# Patient Record
Sex: FEMALE | Race: WHITE | NOT HISPANIC OR LATINO | Employment: OTHER | URBAN - METROPOLITAN AREA
[De-identification: names, ages, dates, MRNs, and addresses within clinical notes are randomized per-mention and may not be internally consistent; named-entity substitution may affect disease eponyms.]

---

## 2017-01-04 ENCOUNTER — APPOINTMENT (OUTPATIENT)
Dept: PHYSICAL THERAPY | Facility: CLINIC | Age: 62
End: 2017-01-04
Payer: COMMERCIAL

## 2017-01-04 PROCEDURE — 97116 GAIT TRAINING THERAPY: CPT

## 2017-01-04 PROCEDURE — 97110 THERAPEUTIC EXERCISES: CPT

## 2017-01-04 PROCEDURE — 97140 MANUAL THERAPY 1/> REGIONS: CPT

## 2017-01-06 ENCOUNTER — APPOINTMENT (OUTPATIENT)
Dept: PHYSICAL THERAPY | Facility: CLINIC | Age: 62
End: 2017-01-06
Payer: COMMERCIAL

## 2017-01-06 PROCEDURE — 97116 GAIT TRAINING THERAPY: CPT

## 2017-01-06 PROCEDURE — 97140 MANUAL THERAPY 1/> REGIONS: CPT

## 2017-01-06 PROCEDURE — 97110 THERAPEUTIC EXERCISES: CPT

## 2017-01-09 ENCOUNTER — APPOINTMENT (OUTPATIENT)
Dept: PHYSICAL THERAPY | Facility: CLINIC | Age: 62
End: 2017-01-09
Payer: COMMERCIAL

## 2017-01-10 ENCOUNTER — APPOINTMENT (OUTPATIENT)
Dept: PHYSICAL THERAPY | Facility: CLINIC | Age: 62
End: 2017-01-10
Payer: COMMERCIAL

## 2017-01-10 ENCOUNTER — GENERIC CONVERSION - ENCOUNTER (OUTPATIENT)
Dept: OTHER | Facility: OTHER | Age: 62
End: 2017-01-10

## 2017-01-10 PROCEDURE — 97140 MANUAL THERAPY 1/> REGIONS: CPT

## 2017-01-10 PROCEDURE — 97110 THERAPEUTIC EXERCISES: CPT

## 2017-01-10 PROCEDURE — 97116 GAIT TRAINING THERAPY: CPT

## 2017-01-12 ENCOUNTER — APPOINTMENT (OUTPATIENT)
Dept: PHYSICAL THERAPY | Facility: CLINIC | Age: 62
End: 2017-01-12
Payer: COMMERCIAL

## 2017-01-12 PROCEDURE — 97116 GAIT TRAINING THERAPY: CPT

## 2017-01-12 PROCEDURE — 97140 MANUAL THERAPY 1/> REGIONS: CPT

## 2017-01-12 PROCEDURE — 97110 THERAPEUTIC EXERCISES: CPT

## 2017-01-17 ENCOUNTER — APPOINTMENT (OUTPATIENT)
Dept: PHYSICAL THERAPY | Facility: CLINIC | Age: 62
End: 2017-01-17
Payer: COMMERCIAL

## 2017-01-17 PROCEDURE — 97140 MANUAL THERAPY 1/> REGIONS: CPT

## 2017-01-17 PROCEDURE — 97116 GAIT TRAINING THERAPY: CPT

## 2017-01-17 PROCEDURE — 97110 THERAPEUTIC EXERCISES: CPT

## 2017-01-19 ENCOUNTER — APPOINTMENT (OUTPATIENT)
Dept: PHYSICAL THERAPY | Facility: CLINIC | Age: 62
End: 2017-01-19
Payer: COMMERCIAL

## 2017-01-25 ENCOUNTER — APPOINTMENT (OUTPATIENT)
Dept: PHYSICAL THERAPY | Facility: CLINIC | Age: 62
End: 2017-01-25
Payer: COMMERCIAL

## 2017-01-25 PROCEDURE — 97110 THERAPEUTIC EXERCISES: CPT

## 2017-01-25 PROCEDURE — 97116 GAIT TRAINING THERAPY: CPT

## 2017-01-25 PROCEDURE — 97140 MANUAL THERAPY 1/> REGIONS: CPT

## 2017-01-27 ENCOUNTER — APPOINTMENT (OUTPATIENT)
Dept: PHYSICAL THERAPY | Facility: CLINIC | Age: 62
End: 2017-01-27
Payer: COMMERCIAL

## 2017-01-27 PROCEDURE — 97110 THERAPEUTIC EXERCISES: CPT

## 2017-01-27 PROCEDURE — 97140 MANUAL THERAPY 1/> REGIONS: CPT

## 2017-01-30 ENCOUNTER — APPOINTMENT (OUTPATIENT)
Dept: PHYSICAL THERAPY | Facility: CLINIC | Age: 62
End: 2017-01-30
Payer: COMMERCIAL

## 2017-01-30 PROCEDURE — 97140 MANUAL THERAPY 1/> REGIONS: CPT

## 2017-01-30 PROCEDURE — 97110 THERAPEUTIC EXERCISES: CPT

## 2017-02-02 ENCOUNTER — APPOINTMENT (OUTPATIENT)
Dept: PHYSICAL THERAPY | Facility: CLINIC | Age: 62
End: 2017-02-02
Payer: COMMERCIAL

## 2017-02-02 PROCEDURE — 97110 THERAPEUTIC EXERCISES: CPT

## 2017-02-02 PROCEDURE — 97140 MANUAL THERAPY 1/> REGIONS: CPT

## 2017-02-02 PROCEDURE — 97116 GAIT TRAINING THERAPY: CPT

## 2017-02-10 ENCOUNTER — TRANSCRIBE ORDERS (OUTPATIENT)
Dept: LAB | Facility: HOSPITAL | Age: 62
End: 2017-02-10

## 2017-02-10 ENCOUNTER — APPOINTMENT (OUTPATIENT)
Dept: LAB | Facility: HOSPITAL | Age: 62
End: 2017-02-10
Payer: COMMERCIAL

## 2017-02-10 DIAGNOSIS — I10 ESSENTIAL HYPERTENSION, MALIGNANT: Primary | ICD-10-CM

## 2017-02-10 DIAGNOSIS — I10 ESSENTIAL (PRIMARY) HYPERTENSION: ICD-10-CM

## 2017-02-10 LAB
ALBUMIN SERPL BCP-MCNC: 3.8 G/DL (ref 3.5–5)
ALP SERPL-CCNC: 50 U/L (ref 46–116)
ALT SERPL W P-5'-P-CCNC: 62 U/L (ref 12–78)
ANION GAP SERPL CALCULATED.3IONS-SCNC: 7 MMOL/L (ref 4–13)
AST SERPL W P-5'-P-CCNC: 24 U/L (ref 5–45)
BACTERIA UR QL AUTO: NORMAL /HPF
BASOPHILS # BLD AUTO: 0.04 THOUSANDS/ΜL (ref 0–0.1)
BASOPHILS NFR BLD AUTO: 1 % (ref 0–1)
BILIRUB SERPL-MCNC: 0.62 MG/DL (ref 0.2–1)
BILIRUB UR QL STRIP: NEGATIVE
BUN SERPL-MCNC: 16 MG/DL (ref 5–25)
CALCIUM SERPL-MCNC: 9 MG/DL (ref 8.3–10.1)
CHLORIDE SERPL-SCNC: 104 MMOL/L (ref 100–108)
CHOLEST SERPL-MCNC: 190 MG/DL (ref 50–200)
CLARITY UR: CLEAR
CO2 SERPL-SCNC: 30 MMOL/L (ref 21–32)
COLOR UR: YELLOW
CREAT SERPL-MCNC: 0.88 MG/DL (ref 0.6–1.3)
EOSINOPHIL # BLD AUTO: 0.21 THOUSAND/ΜL (ref 0–0.61)
EOSINOPHIL NFR BLD AUTO: 4 % (ref 0–6)
ERYTHROCYTE [DISTWIDTH] IN BLOOD BY AUTOMATED COUNT: 12 % (ref 11.6–15.1)
GFR SERPL CREATININE-BSD FRML MDRD: >60 ML/MIN/1.73SQ M
GLUCOSE SERPL-MCNC: 91 MG/DL (ref 65–140)
GLUCOSE UR STRIP-MCNC: NEGATIVE MG/DL
HCT VFR BLD AUTO: 40.4 % (ref 34.8–46.1)
HDLC SERPL-MCNC: 62 MG/DL (ref 40–60)
HGB BLD-MCNC: 13.5 G/DL (ref 11.5–15.4)
HGB UR QL STRIP.AUTO: NEGATIVE
HYALINE CASTS #/AREA URNS LPF: NORMAL /LPF
KETONES UR STRIP-MCNC: NEGATIVE MG/DL
LDLC SERPL CALC-MCNC: 102 MG/DL (ref 0–100)
LEUKOCYTE ESTERASE UR QL STRIP: ABNORMAL
LYMPHOCYTES # BLD AUTO: 2.11 THOUSANDS/ΜL (ref 0.6–4.47)
LYMPHOCYTES NFR BLD AUTO: 42 % (ref 14–44)
MCH RBC QN AUTO: 30.3 PG (ref 26.8–34.3)
MCHC RBC AUTO-ENTMCNC: 33.4 G/DL (ref 31.4–37.4)
MCV RBC AUTO: 91 FL (ref 82–98)
MONOCYTES # BLD AUTO: 0.41 THOUSAND/ΜL (ref 0.17–1.22)
MONOCYTES NFR BLD AUTO: 8 % (ref 4–12)
NEUTROPHILS # BLD AUTO: 2.21 THOUSANDS/ΜL (ref 1.85–7.62)
NEUTS SEG NFR BLD AUTO: 45 % (ref 43–75)
NITRITE UR QL STRIP: NEGATIVE
NON-SQ EPI CELLS URNS QL MICRO: NORMAL /HPF
NRBC BLD AUTO-RTO: 0 /100 WBCS
PH UR STRIP.AUTO: 5.5 [PH] (ref 4.5–8)
PLATELET # BLD AUTO: 290 THOUSANDS/UL (ref 149–390)
PMV BLD AUTO: 9.8 FL (ref 8.9–12.7)
POTASSIUM SERPL-SCNC: 4.2 MMOL/L (ref 3.5–5.3)
PROT SERPL-MCNC: 7.3 G/DL (ref 6.4–8.2)
PROT UR STRIP-MCNC: NEGATIVE MG/DL
RBC # BLD AUTO: 4.46 MILLION/UL (ref 3.81–5.12)
RBC #/AREA URNS AUTO: NORMAL /HPF
SODIUM SERPL-SCNC: 141 MMOL/L (ref 136–145)
SP GR UR STRIP.AUTO: 1.01 (ref 1–1.03)
T4 FREE SERPL-MCNC: 0.89 NG/DL (ref 0.76–1.46)
TRIGL SERPL-MCNC: 128 MG/DL
TSH SERPL DL<=0.05 MIU/L-ACNC: 1.24 UIU/ML (ref 0.36–3.74)
UROBILINOGEN UR QL STRIP.AUTO: 0.2 E.U./DL
WBC # BLD AUTO: 4.99 THOUSAND/UL (ref 4.31–10.16)
WBC #/AREA URNS AUTO: NORMAL /HPF

## 2017-02-10 PROCEDURE — 85025 COMPLETE CBC W/AUTO DIFF WBC: CPT

## 2017-02-10 PROCEDURE — 80061 LIPID PANEL: CPT

## 2017-02-10 PROCEDURE — 84439 ASSAY OF FREE THYROXINE: CPT

## 2017-02-10 PROCEDURE — 81001 URINALYSIS AUTO W/SCOPE: CPT | Performed by: INTERNAL MEDICINE

## 2017-02-10 PROCEDURE — 36415 COLL VENOUS BLD VENIPUNCTURE: CPT

## 2017-02-10 PROCEDURE — 80053 COMPREHEN METABOLIC PANEL: CPT

## 2017-02-10 PROCEDURE — 84443 ASSAY THYROID STIM HORMONE: CPT

## 2017-02-13 ENCOUNTER — APPOINTMENT (OUTPATIENT)
Dept: LAB | Facility: HOSPITAL | Age: 62
End: 2017-02-13
Payer: COMMERCIAL

## 2017-02-13 DIAGNOSIS — I10 ESSENTIAL HYPERTENSION, MALIGNANT: ICD-10-CM

## 2017-02-13 LAB — HEMOCCULT STL QL IA: POSITIVE

## 2017-02-13 PROCEDURE — G0328 FECAL BLOOD SCRN IMMUNOASSAY: HCPCS

## 2017-02-16 ENCOUNTER — ALLSCRIPTS OFFICE VISIT (OUTPATIENT)
Dept: OTHER | Facility: OTHER | Age: 62
End: 2017-02-16

## 2017-03-07 ENCOUNTER — GENERIC CONVERSION - ENCOUNTER (OUTPATIENT)
Dept: OTHER | Facility: OTHER | Age: 62
End: 2017-03-07

## 2017-03-21 ENCOUNTER — GENERIC CONVERSION - ENCOUNTER (OUTPATIENT)
Dept: OTHER | Facility: OTHER | Age: 62
End: 2017-03-21

## 2017-04-26 ENCOUNTER — LAB REQUISITION (OUTPATIENT)
Dept: LAB | Facility: HOSPITAL | Age: 62
End: 2017-04-26
Payer: COMMERCIAL

## 2017-04-26 ENCOUNTER — ALLSCRIPTS OFFICE VISIT (OUTPATIENT)
Dept: OTHER | Facility: OTHER | Age: 62
End: 2017-04-26

## 2017-04-26 DIAGNOSIS — Z12.31 ENCOUNTER FOR SCREENING MAMMOGRAM FOR MALIGNANT NEOPLASM OF BREAST: ICD-10-CM

## 2017-04-26 DIAGNOSIS — Z01.419 ENCOUNTER FOR GYNECOLOGICAL EXAMINATION WITHOUT ABNORMAL FINDING: ICD-10-CM

## 2017-04-26 PROCEDURE — G0145 SCR C/V CYTO,THINLAYER,RESCR: HCPCS | Performed by: OBSTETRICS & GYNECOLOGY

## 2017-04-26 PROCEDURE — 87624 HPV HI-RISK TYP POOLED RSLT: CPT | Performed by: OBSTETRICS & GYNECOLOGY

## 2017-05-03 LAB
HPV RRNA GENITAL QL NAA+PROBE: NORMAL
LAB AP GYN PRIMARY INTERPRETATION: NORMAL
Lab: NORMAL

## 2017-05-04 ENCOUNTER — GENERIC CONVERSION - ENCOUNTER (OUTPATIENT)
Dept: OTHER | Facility: OTHER | Age: 62
End: 2017-05-04

## 2017-05-23 ENCOUNTER — HOSPITAL ENCOUNTER (OUTPATIENT)
Dept: RADIOLOGY | Facility: HOSPITAL | Age: 62
Discharge: HOME | End: 2017-05-23
Attending: OBSTETRICS & GYNECOLOGY
Payer: COMMERCIAL

## 2017-05-23 DIAGNOSIS — Z12.31 ENCOUNTER FOR SCREENING MAMMOGRAM FOR MALIGNANT NEOPLASM OF BREAST: ICD-10-CM

## 2017-05-23 DIAGNOSIS — Z01.419 ENCOUNTER FOR GYNECOLOGICAL EXAMINATION WITHOUT ABNORMAL FINDING: ICD-10-CM

## 2017-05-23 PROCEDURE — G0202 SCR MAMMO BI INCL CAD: HCPCS

## 2017-05-25 ENCOUNTER — ALLSCRIPTS OFFICE VISIT (OUTPATIENT)
Dept: OTHER | Facility: OTHER | Age: 62
End: 2017-05-25

## 2017-07-06 ENCOUNTER — GENERIC CONVERSION - ENCOUNTER (OUTPATIENT)
Dept: OTHER | Facility: OTHER | Age: 62
End: 2017-07-06

## 2017-07-20 ENCOUNTER — GENERIC CONVERSION - ENCOUNTER (OUTPATIENT)
Dept: OTHER | Facility: OTHER | Age: 62
End: 2017-07-20

## 2017-08-23 ENCOUNTER — ALLSCRIPTS OFFICE VISIT (OUTPATIENT)
Dept: OTHER | Facility: OTHER | Age: 62
End: 2017-08-23

## 2017-08-23 DIAGNOSIS — I10 ESSENTIAL (PRIMARY) HYPERTENSION: ICD-10-CM

## 2018-01-02 ENCOUNTER — ALLSCRIPTS OFFICE VISIT (OUTPATIENT)
Dept: OTHER | Facility: OTHER | Age: 63
End: 2018-01-02

## 2018-01-03 NOTE — PROGRESS NOTES
Assessment   1  Acute bronchitis (466 0) (J20 9)   2  Hypertension (401 9) (I10)    Plan   Acute bronchitis    · Azithromycin 250 MG Oral Tablet; Take as directed per package instructions   · Benzonatate 100 MG Oral Capsule; TAKE 1 CAPSULE 3 TIMES DAILY AS NEEDED    Discussion/Summary      1  Upper respiratory tract infection/bronchitis  Despite this may be only a viral illness patient was placed on a Zithromax Z-Jean to take as directed in order to hopefully expedite her improvement so that she can see her new grandchild  Patient will continue with over-the-counter medications such as Mucinex and she was also given a prescription for Tessalon Perles to use as needed to help with cough  She she was told to keep herself well hydrated and call if there is any changes in her symptoms or lack of improvement  Hypertension  Controlled present treatment  The patient was counseled regarding diagnostic results,-- instructions for management,-- risk factor reductions,-- prognosis,-- patient and family education,-- impressions,-- risks and benefits of treatment options,-- importance of compliance with treatment  Possible side effects of new medications were reviewed with the patient/guardian today  The treatment plan was reviewed with the patient/guardian  The patient/guardian understands and agrees with the treatment plan      Chief Complaint   Upper respiratory tract infection      History of Present Illness   HPI: Patient is a 65-year-old female with a history of hypertension, hyperlipidemia  She is here today for evaluation of an upper respiratory tract infection  She states that she has been ill for approximately 1 week with what started out to be a cough  She states at this time she still is having problems with cough and chest congestion and is bringing up occasionally a dark greenish mucus  She also has a sore throat especially nighttime   She denies any high fever or chills and no increasing shortness of breath or wheezing  She is very upset because she needs to be taking care of her mother who is in her nursing home and also she is now a grandmother and wishes very much to meet the new [facility] Based Practices Required Assessment:      Pain Assessment      the patient states they do not have pain  Abuse And Domestic Violence Screen       Yes, the patient is safe at home  Depression And Suicide Screen  No, the patient has not had thoughts of hurting themself  No, the patient has not felt depressed in the past 7 days  Primary Language is  English  Readiness To Learn: Receptive  Barriers To Learning: none  Preferred Learning: verbal      Education Completed: disease/condition,-- medications-- and-- further treatment/follow-up      Teaching Method: verbal      Person Taught: patient      Evaluation Of Learning: verbalized/demonstrated understanding      Review of Systems        Constitutional: feeling poorly-- and-- feeling tired, but-- no fever,-- no recent weight gain,-- no chills-- and-- no recent weight loss  ENT: nosebleeds-- and-- sore throat, but-- no earache,-- no hearing loss-- and-- no nasal discharge  Cardiovascular: no complaints of slow or fast heart rate, no chest pain, no palpitations, no leg claudication or lower extremity edema  Respiratory: cough, but-- no shortness of breath,-- no orthopnea,-- no wheezing,-- no shortness of breath during exertion-- and-- no PND  Breasts: no complaints of breast pain, breast lump or nipple discharge  Gastrointestinal: no complaints of abdominal pain, no constipation, no nausea or diarrhea, no vomiting, no bloody stools  Genitourinary: no complaints of dysuria, no incontinence, no pelvic pain, no dysmenorrhea, no vaginal discharge or abnormal vaginal bleeding  Musculoskeletal: myalgias, but-- no arthralgias,-- no joint swelling,-- no limb pain,-- no joint stiffness-- and-- no limb swelling  Integumentary: no complaints of skin rash or lesion, no itching or dry skin, no skin wounds  Neurological: no complaints of headache, no confusion, no numbness or tingling, no dizziness or fainting  ROS reviewed  Active Problems   1  Acute bronchitis (466 0) (J20 9)   2  Atopic dermatitis (691 8) (L20 9)   3  Bursitis (727 3) (M71 9)   4  Encounter for screening mammogram for malignant neoplasm of breast (V76 12)     (Z12 31)   5  Gastritis, acute (535 00) (K29 00)   6  Heme positive stool (792 1) (R19 5)   7  Hyperlipidemia (272 4) (E78 5)   8  Hypertension (401 9) (I10)   9  Lumbago (724 2) (M54 5)   10  Lumbago with sciatica (724 3) (M54 40)   11  Need for immunization against influenza (V04 81) (Z23)   12  Need for pneumococcal vaccination (V03 82) (Z23)   13  URI (upper respiratory infection) (465 9) (J06 9)   14  Well female exam with routine gynecological exam (V72 31) (Z01 419)    Past Medical History   Active Problems And Past Medical History Reviewed: The active problems and past medical history were reviewed and updated today  Surgical History   Surgical History Reviewed: The surgical history was reviewed and updated today  Social History    · Never a smoker  The social history was reviewed and updated today  The social history was reviewed and is unchanged  Family History   Family History Reviewed: The family history was reviewed and updated today  Current Meds    1  Atorvastatin Calcium 10 MG Oral Tablet; TAKE 1 TABLET 4 times weekly; Therapy: 69NUC1047 to (Harjeet Guerra)  Requested for: 61ZHD7019 Recorded   2  Lisinopril 10 MG Oral Tablet; Take 1 tablet daily; Therapy: 95IWK0053 to (Pedro Garcia)  Requested for: 57CLB7815; Last     Rx:27Nov2017 Ordered   3  Omeprazole 20 MG Oral Capsule Delayed Release; Take 1 capsule daily as needed;      Therapy: 76ICS6214 to (Last Rx:29Yea7667)  Requested for: 55DBP6102 Ordered     The medication list was reviewed and updated today  Allergies   1  Coly-Mycin S 3 3-3-10-0 5 MG/ML Otic Suspension   2  Cortisporin-TC SUSP    Vitals    Recorded: [id number] 01:53PM   Temperature 96 6 F   Heart Rate 73   Respiration 16   Systolic 749   Diastolic 80   Height 5 ft 5 in   Weight 142 lb    BMI Calculated 23 63   BSA Calculated 1 71   O2 Saturation 98     Physical Exam        Constitutional Mildly ill-appearing, congested-sounding 53-year-old female who is awake alert  Eyes      Conjunctiva and lids: No swelling, erythema or discharge  Pupils and irises: Equal, round and reactive to light  Ears, Nose, Mouth, and Throat      External inspection of ears and nose: Normal        Otoscopic examination: Tympanic membranes translucent with normal light reflex  Canals patent without erythema  Nasal mucosa, septum, and turbinates: Abnormal  -- Mild diffuse erythema to her nasal passages with a clear nasal discharge  Oropharynx: Abnormal  -- Slight erythema to posterior airway with a thick whitish postnasal drip  Pulmonary      Respiratory effort: No increased work of breathing or signs of respiratory distress  Auscultation of lungs: Abnormal  -- Slightly decreased breath sounds anterior and posteriorly but no rales rhonchi or wheezes  Cardiovascular      Palpation of heart: Normal PMI, no thrills  Auscultation of heart: Normal rate and rhythm, normal S1 and S2, without murmurs  Abdomen      Abdomen: Abnormal  -- Mildly obese soft nontender  Liver and spleen: No hepatomegaly or splenomegaly  Lymphatic      Palpation of lymph nodes in neck: No lymphadenopathy         Musculoskeletal      Gait and station: Normal        Psychiatric      Orientation to person, place, and time: Normal        Mood and affect: Normal           Future Appointments      Date/Time Provider Specialty Site   02/28/2018 12:00 PM Sarah Barry DO Internal Medicine [facility] INTERNAL MED     Signatures    Electronically signed by : Elisabeth Hartman DO; Jan 2 2018  2:28PM EST                       (Author)

## 2018-01-10 NOTE — RESULT NOTES
Verified Results  (1) THIN PREP PAP WITH IMAGING 66LFL2801 55:40OC Horacio Rodriguez Order Number: [id number]     Test Name Result Flag Reference   LAB AP CASE REPORT (Report)     Gynecologic Cytology Report            Case: [id number]                  Authorizing Provider: Jamel Leyva DO     Collected:      04/26/2017 1505        First Screen:     QUENTIN Armenta   Received:      04/28/2017 1443        Specimen:  LIQUID-BASED PAP, SCREENING, Endocervical   HPV HIGH RISK RESULT (Report)     HPV, High Risk: HPV NEG, HPV16 NEG, HPV18 NEG      Other High Risk HPV Negative, HPV 16 Negative, HPV 18 Negative  HPV types: 16,18,31,33,35,39,45,51,52,56,58,59,66 and 68 DNA are undetectable or below the pre-set threshold  [employer]???s FDA approved Lemuel 4800 is utilized with strict adherence to the ???s instruction  manual to test for the presence of High-Risk HPV DNA, as well as HPV 16 and HPV 18  This instrument  has been validated by our laboratory and/or by the   A negative result does not preclude the presence of HPV infection because results depend on adequate  specimen collection, absence of inhibitors and sufficient DNA to be detected  Additionally, HPV negative  results are not intended to prevent women from proceeding to colposcopy if clinically warranted  Positive HPV test results indicate the presence of any one or more of the high risk types, but since patients  are often co-infected with low-risk types it does not rule out the presence of low-risk types in patients  with mixed infections  LAB AP GYN PRIMARY INTERPRETATION      Negative for intraepithelial lesion or malignancy  Electronically signed by QUENTIN Armetna on 5/3/2017 at 1:41 PM   LAB AP GYN SPECIMEN ADEQUACY      Satisfactory for evaluation  Endocervical/transformation zone component present     LAB AP GYN ADDITIONAL INFORMATION (Report)     [employer]'s FDA approved ,  and ThinPrep Imaging System are   utilized with strict adherence to the 's instruction manual to   prepare gynecologic and non-gynecologic cytology specimens for the   production of ThinPrep slides as well as for gynecologic ThinPrep imaging  These processes have been validated by our laboratory and/or by the     The Pap test is not a diagnostic procedure and should not be used as the   sole means to detect cervical cancer  It is only a screening procedure to   aid in the detection of cervical cancer and its precursors  Both   false-negative and false-positive results have been experienced  Your   patient's test result should be interpreted in this context together with   the history and clinical findings

## 2018-01-12 VITALS
OXYGEN SATURATION: 98 % | BODY MASS INDEX: 23.18 KG/M2 | HEART RATE: 71 BPM | HEIGHT: 65 IN | SYSTOLIC BLOOD PRESSURE: 130 MMHG | TEMPERATURE: 97.4 F | DIASTOLIC BLOOD PRESSURE: 86 MMHG | WEIGHT: 139.13 LBS

## 2018-01-12 NOTE — PROGRESS NOTES
Assessment    1  Hypertension (401 9) (I10)   2  Hyperlipidemia (272 4) (E78 5)    Plan  Hyperlipidemia    · Follow-up visit in 6 months Evaluation and Treatment  Follow-up  Status: Hold For - Scheduling   Requested for: 23Aug2017  Hypertension    · (1) CBC/PLT/DIFF; Status:Active; Requested for:23Aug2017;    · (1) COMPREHENSIVE METABOLIC PANEL; Status:Active; Requested for:23Aug2017;    · (1) LIPID PANEL, FASTING; Status:Active; Requested for:23Aug2017;    · (1) TSH WITH FT4 REFLEX; Status:Active; Requested for:23Aug2017;    · (1) URINALYSIS (will reflex a microscopy if leukocytes, occult blood, protein or nitrites are not within  normal limits); Status:Active; Requested for:23Aug2017;     Discussion/Summary  Discussion Summary:   #1  Hypertension  As noted patient's blood pressure is still mildly elevated but not as severely so is her last visit  Again patient admits to being under a lot of stress and this may be the aggravating factor  Patient will return to the office in 6 months for routine physical and we will check her blood pressure at that time and if it is still elevated would consider modification of treatment  #2  Hyperlipidemia  Patient states she's been working hard on her diet and reducing fats and cholesterol in her diet  She admits to the fact that she's not getting as much regular exercises in the past     Patient will be  Counseling Documentation With Imm: The patient was counseled regarding diagnostic results, instructions for management, risk factor reductions, prognosis, patient and family education, impressions, risks and benefits of treatment options, importance of compliance with treatment  Medication SE Review and Pt Understands Tx: Possible side effects of new medications were reviewed with the patient/guardian today  The treatment plan was reviewed with the patient/guardian   The patient/guardian understands and agrees with the treatment plan      Chief Complaint  Chief Complaint Free Text Note Form: Patient is here today for a 4 month follow up   [address] [place] [address] Ave: Patient is here today for follow up of chronic conditions described in HPI  History of Present Illness  HPI: Patient is a 51-year-old female with a history of hypertension, hyperlipidemia, history of lumbago with sciatica in the past  Patient is here today for routine follow-up after 6 month period of time  As noted patient's blood pressure is improved somewhat but is not back to baseline  Apparently patient is still going through a lot of stresses not only at home but at work  She denies any new specific medical problems or complaints  She denies chest pain or pressure and no shortness of breath  Review of Systems  Complete-Female:   Constitutional: No fever, no chills, feels well, no tiredness, no recent weight gain or weight loss  Eyes: No complaints of eye pain, no red eyes, no eyesight problems, no discharge, no dry eyes, no itching of eyes  ENT: no complaints of earache, no loss of hearing, no nose bleeds, no nasal discharge, no sore throat, no hoarseness  Cardiovascular: No complaints of slow heart rate, no fast heart rate, no chest pain, no palpitations, no leg claudication, no lower extremity edema  Respiratory: No complaints of shortness of breath, no wheezing, no cough, no SOB on exertion, no orthopnea, no PND  Gastrointestinal: Some mild stomach upset, but no abdominal pain, no nausea, no vomiting, no constipation, no diarrhea and no blood in stools  Genitourinary: No complaints of dysuria, no incontinence, no pelvic pain, no dysmenorrhea, no vaginal discharge or bleeding  Musculoskeletal: no arthralgias, no joint swelling, no limb pain, no myalgias, no joint stiffness and no limb swelling  Integumentary: No complaints of skin rash or lesions, no itching, no skin wounds, no breast pain or lump     Neurological: No complaints of headache, no confusion, no convulsions, no numbness, no dizziness or fainting, no tingling, no limb weakness, no difficulty walking  Psychiatric: anxiety, but not suicidal, no personality change, no sleep disturbances and no depression  Endocrine: No complaints of proptosis, no hot flashes, no muscle weakness, no deepening of the voice, no feelings of weakness  Hematologic/Lymphatic: No complaints of swollen glands, no swollen glands in the neck, does not bleed easily, does not bruise easily  ROS Reviewed:   ROS reviewed  Active Problems    1  Acute bronchitis (466 0) (J20 9)   2  Atopic dermatitis (691 8) (L20 9)   3  Bursitis (727 3) (M71 9)   4  Encounter for screening mammogram for malignant neoplasm of breast (V76 12) (Z12 31)   5  Gastritis, acute (535 00) (K29 00)   6  Heme positive stool (792 1) (R19 5)   7  Hyperlipidemia (272 4) (E78 5)   8  Hypertension (401 9) (I10)   9  Lumbago (724 2) (M54 5)   10  Lumbago with sciatica (724 3) (M54 40)   11  Need for immunization against influenza (V04 81) (Z23)   12  URI (upper respiratory infection) (465 9) (J06 9)   13  Well female exam with routine gynecological exam () (Z01 419)    Surgical History    1  History of  Section   2  History of Hysteroscopy With Endometrial Ablation    Family History  Mother    1  No pertinent family history    Social History    · Never a smoker    Current Meds   1  Atorvastatin Calcium 10 MG Oral Tablet; take 1 tablet by mouth daily; Therapy: 34KOW6387 to (Evaluate:2018)  Requested for: 96RYV8135; Last Rx:2017   Ordered   2  Lisinopril 10 MG Oral Tablet; Take 1 tablet daily; Therapy: 11KBI7338 to (Jennifer Maloney)  Requested for: 17FJL5805; Last Rx:2016   Ordered   3  Omeprazole 20 MG Oral Capsule Delayed Release; Take 1 capsule daily as needed; Therapy: 25JSG8859 to (Last Rx:50Mqi5238)  Requested for: 45YND4828 Ordered    Allergies    1  Coly-Mycin S 3 3-3-10-0 5 MG/ML Otic Suspension   2   Cortisporin-TC SUSP    Vitals  Vital Signs    Recorded: 23Aug2017 11:53AM   Temperature 97 4 F   Heart Rate 71   Systolic 407   Diastolic 86   Height 5 ft 5 in   Weight 139 lb 2 oz   BMI Calculated 23 15   BSA Calculated 1 7   O2 Saturation 98     Physical Exam    Constitutional Pleasant healthy-appearing 68-year-old female who is awake alert  Eyes   Conjunctiva and lids: No swelling, erythema or discharge  Pupils and irises: Equal, round and reactive to light  Ears, Nose, Mouth, and Throat   External inspection of ears and nose: Normal     Otoscopic examination: Tympanic membranes translucent with normal light reflex  Canals patent without erythema  Nasal mucosa, septum, and turbinates: Normal without edema or erythema  Oropharynx: Normal with no erythema, edema, exudate or lesions  Pulmonary   Respiratory effort: No increased work of breathing or signs of respiratory distress  Auscultation of lungs: Clear to auscultation  Cardiovascular   Palpation of heart: Normal PMI, no thrills  Auscultation of heart: Normal rate and rhythm, normal S1 and S2, without murmurs  Examination of extremities for edema and/or varicosities: Normal     Carotid pulses: Normal     Abdomen   Abdomen: Abnormal   Mildly obese soft nontender  Liver and spleen: No hepatomegaly or splenomegaly  Lymphatic   Palpation of lymph nodes in neck: No lymphadenopathy  Musculoskeletal   Gait and station: Normal     Digits and nails: Normal without clubbing or cyanosis  Inspection/palpation of joints, bones, and muscles: Normal     Skin   Skin and subcutaneous tissue: Normal without rashes or lesions  Neurologic   Cranial nerves: Cranial nerves 2-12 intact  Reflexes: 2+ and symmetric  Sensation: No sensory loss      Psychiatric   Orientation to person, place, and time: Normal     Mood and affect: Normal          Results/Data  PHQ-2 Adult Depression Screening 23Aug2017 11:57AM User, [employer]     Test Name Result Flag Reference PHQ-2 Adult Depression Score 0     Over the last two weeks, how often have you been bothered by any of the following problems?   Little interest or pleasure in doing things: Not at all - 0  Feeling down, depressed, or hopeless: Not at all - 0   PHQ-2 Adult Depression Screening Negative         Signatures   Electronically signed by : Evelyne Langford DO; Aug 23 2017 12:25PM EST                       (Author)

## 2018-01-14 VITALS
SYSTOLIC BLOOD PRESSURE: 130 MMHG | OXYGEN SATURATION: 96 % | RESPIRATION RATE: 16 BRPM | WEIGHT: 143 LBS | TEMPERATURE: 100.8 F | BODY MASS INDEX: 23.82 KG/M2 | DIASTOLIC BLOOD PRESSURE: 100 MMHG | HEIGHT: 65 IN | HEART RATE: 88 BPM

## 2018-01-14 VITALS
BODY MASS INDEX: 23.82 KG/M2 | HEIGHT: 65 IN | HEART RATE: 73 BPM | DIASTOLIC BLOOD PRESSURE: 90 MMHG | WEIGHT: 143 LBS | SYSTOLIC BLOOD PRESSURE: 118 MMHG

## 2018-01-18 NOTE — PROGRESS NOTES
Assessment    1  Gastritis, acute (535 00) (K29 00)   2  Hypertension (401 9) (I10)   3  Hyperlipidemia (272 4) (E78 5)   4  Heme positive stool (792 1) (R19 5)    Plan  Heme positive stool    · Follow-up visit in 4 Months Evaluation and Treatment  Follow-up  Status: Hold For -  Scheduling  Requested for: [id number]    Discussion/Summary  health maintenance visit Currently, she eats a healthy diet and has an adequate exercise regimen  the risks and benefits of cervical cancer screening were discussed cervical cancer screening is current Breast cancer screening: the risks and benefits of breast cancer screening were discussed and mammogram is current  Colorectal cancer screening: the risks and benefits of colorectal cancer screening were discussed, colorectal cancer screening is current and fecal occult blood testing is needed every year  Osteoporosis screening: the risks and benefits of osteoporosis screening were discussed and bone mineral density testing is not indicated  The risks and benefits of immunizations were discussed and immunizations are up to date  Advice and education were given regarding nutrition, vitamin D supplements and sunscreen use  Patient discussion: discussed with the patient  #1  Possible acute gastritis  Patient states that she has been under a lot of stress and this is when her problem began  Again she's responded extremely well to over-the-counter medications such as Mylanta or Tums  Patient will be placed on omeprazole 20 mg by mouth daily and was told if not responding to the treatment to please call immediately for further workup and evaluation  Patient was told that if any black stools, blood in the stools to please call immediately  #2  Hypertension  Patient's blood pressure showing excellent control with present treatment  Patient will continue with present medication and we will check this in 4 months  #3  Hyperlipidemia   Patient's cholesterol is showing excellent control with present treatment  Patient will continue on present medication  There is a history of heart disease in the family so we reassured the patient that this is the proper treatment  #4  Heme positive stools  Patient did have a routine colonoscopy in 2003 but because of heme positive stool she will be sent for GI workup with her colorectal specialist    The patient has the current Goals: Resolution of abdominal discomfort  The patent has the current Barriers: No specific barriers at this time  Patient is able to Self-Care  Possible side effects of new medications were reviewed with the patient/guardian today  The treatment plan was reviewed with the patient/guardian  The patient/guardian understands and agrees with the treatment plan   The patient was counseled regarding diagnostic results, instructions for management, risk factor reductions, prognosis, patient and family education, impressions, risks and benefits of treatment options, importance of compliance with treatment  Chief Complaint  Patient is here today for a yearly physical w/ labs      History of Present Illness  HM, Adult Female: The patient is being seen for a health maintenance evaluation  The last health maintenance visit was One year ago year(s) ago  Social History: Household members include spouse  She is   Work status: working full time  The patient has never smoked cigarettes  She reports rare alcohol use  The patient has no concerns about alcohol abuse  She has never used illicit drugs  General Health: She has regular dental visits  She denies vision problems  She denies hearing loss  Immunizations status: up to date  Lifestyle:  She consumes a diverse and healthy diet  She has weight concerns  She does not exercise regularly  She uses tobacco  She consumes alcohol  She denies drug use  Reproductive health: the patient is postmenopausal   she reports normal menses  she is sexually active  pregnancy history:  Screening: cancer screening reviewed and updated  metabolic screening reviewed and updated  risk screening reviewed and updated  HPI: Patient is a 53-year-old female with a history of hyperlipidemia, hypertension  Patient is here today for routine follow-up and general physical  She did have complete labs obtained prior to her visit today we did discuss the results  The only abnormality is a heme-positive stool and she will be making an appointment in the near future to see her colorectal specialist  She is up-to-date with routine colonoscopies  Patient states in general she's feeling well but states she has some slight indigestion occasionally  She relates that she is taking Mylanta occasionally which is helpful or problems  She denies any black stools or blood in the stools and no nausea or vomiting  She has had no significant weight loss  As to her back pain she states she's completely recovered after extensive physical therapy and the thought was that the pain was not in her back so much as in her hip      Review of Systems    Constitutional: No fever, no chills, feels well, no tiredness, no recent weight gain or weight loss  Eyes: No complaints of eye pain, no red eyes, no eyesight problems, no discharge, no dry eyes, no itching of eyes  ENT: no complaints of earache, no loss of hearing, no nose bleeds, no nasal discharge, no sore throat, no hoarseness  Cardiovascular: No complaints of slow heart rate, no fast heart rate, no chest pain, no palpitations, no leg claudication, no lower extremity edema  Respiratory: No complaints of shortness of breath, no wheezing, no cough, no SOB on exertion, no orthopnea, no PND  Gastrointestinal: Some mild stomach upset, but no abdominal pain, no nausea, no vomiting, no constipation, no diarrhea and no blood in stools  Genitourinary: No complaints of dysuria, no incontinence, no pelvic pain, no dysmenorrhea, no vaginal discharge or bleeding  Musculoskeletal: no arthralgias, no joint swelling, no limb pain, no myalgias, no joint stiffness and no limb swelling  Integumentary: No complaints of skin rash or lesions, no itching, no skin wounds, no breast pain or lump  Neurological: No complaints of headache, no confusion, no convulsions, no numbness, no dizziness or fainting, no tingling, no limb weakness, no difficulty walking  Psychiatric: Not suicidal, no sleep disturbance, no anxiety or depression, no change in personality, no emotional problems  Endocrine: No complaints of proptosis, no hot flashes, no muscle weakness, no deepening of the voice, no feelings of weakness  Hematologic/Lymphatic: No complaints of swollen glands, no swollen glands in the neck, does not bleed easily, does not bruise easily  Over the past 2 weeks, how often have you been bothered by the following problems? 1 ) Little interest or pleasure in doing things? Not at all    2 ) Feeling down, depressed or hopeless? Not at all    3 ) Trouble falling asleep or sleeping too much? Not at all    4 ) Feeling tired or having little energy? Not at all    5 ) Poor appetite or overeating? Not at all    6 ) Feeling bad about yourself, or that you are a failure, or have let yourself or your family down? Not at all    7 ) Trouble concentrating on things, such as reading a newspaper or watching television? Not at all    8 ) Moving or speaking so slowly that other people could have noticed, or the opposite, moving or speaking faster than usual? Not at all    9 ) Thoughts that you would be better off dead or of hurting yourself in some way? Not at all  Score 0      Active Problems    1  Acute bronchitis (466 0) (J20 9)   2  Atopic dermatitis (691 8) (L20 9)   3  Bursitis (727 3) (M71 9)   4  Gastritis, acute (535 00) (K29 00)   5  Hyperlipidemia (272 4) (E78 5)   6  Hypertension (401 9) (I10)   7  Lumbago (724 2) (M54 5)   8  Lumbago with sciatica (724 3) (M54 40)   9   Need for immunization against influenza (V04 81) (Z23)   10  URI (upper respiratory infection) (465 9) (J06 9)    Family History  Mother    · No pertinent family history    Social History    · Never a smoker    Current Meds   1  Atorvastatin Calcium 10 MG Oral Tablet; TAKE ONE TABLET BY MOUTH EVERY DAY; Therapy: [id number] to ([phone])  Requested for: 61WSH8683; Last   Rx:08Oct2015 Ordered   2  Carisoprodol 250 MG Oral Tablet; One pill at bedtime as needed; Therapy: 98RME2279 to (Last Rx:04Nov2016) Ordered   3  Fluticasone Propionate 50 MCG/ACT Nasal Suspension; USE 1 SPRAY IN EACH   NOSTRIL TWICE DAILY; Therapy: 36SVL8591 to (Last BV:[id number])  Requested for: 58QTJ5057 Ordered   4  Lisinopril 10 MG Oral Tablet; Take 1 tablet daily; Therapy: 80LRV0657 to (Judy Wyatt)  Requested for: 62WAT1548; Last   Rx:28Nov2016 Ordered   5  Medrol 4 MG Oral Tablet Therapy Pack; Take as per package instructions; Therapy: 50FOE3033 to (Last Rx:28Oct2016) Ordered   6  TraMADol HCl - 50 MG Oral Tablet; Take 1 tablet 3 times daily as needed; Therapy: 47RSZ7131 to (Last Rx:28Oct2016) Ordered    Allergies    1  Coly-Mycin S 3 3-3-10-0 5 MG/ML Otic Suspension   2  Cortisporin-TC SUSP    Vitals   Recorded: [id number] 01:00PM   Temperature 97 3 F   Heart Rate 78   Systolic 836   Diastolic 72   Height 5 ft 5 in   Weight 144 lb 6 08 oz   BMI Calculated 24 03   BSA Calculated 1 72   O2 Saturation 98     Physical Exam    Constitutional Pleasant healthy-appearing 70-year-old female who is awake alert no acute distress  Head and Face   Head and face: Normal     Palpation of the face and sinuses: No sinus tenderness  Eyes   Conjunctiva and lids: No swelling, erythema or discharge  Pupils and irises: Equal, round, reactive to light  Ophthalmoscopic examination: Normal fundi and optic discs      Ears, Nose, Mouth, and Throat   External inspection of ears and nose: Normal     Otoscopic examination: Tympanic membranes translucent with normal light reflex  Canals patent without erythema  Hearing: Normal     Nasal mucosa, septum, and turbinates: Normal without edema or erythema  Lips, teeth, and gums: Normal, good dentition  Oropharynx: Normal with no erythema, edema, exudate or lesions  Neck   Neck: Supple, symmetric, trachea midline, no masses  Thyroid: Normal, no thyromegaly  Pulmonary   Respiratory effort: No increased work of breathing or signs of respiratory distress  Percussion of chest: Normal     Palpation of chest: Normal     Auscultation of lungs: Clear to auscultation  Cardiovascular   Palpation of heart: Normal PMI, no thrills  Auscultation of heart: Normal rate and rhythm, normal S1 and S2, no murmurs  Carotid pulses: 2+ bilaterally  Abdominal aorta: Normal     Femoral pulses: 2+ bilaterally  Pedal pulses: 2+ bilaterally  Peripheral vascular exam: Normal     Examination of extremities for edema and/or varicosities: Normal     Chest Patient goes for breast exam and routine mammograms on a yearly basis  Abdomen   Abdomen: Abnormal   Mildly obese soft with just very mild tenderness to deep palpation to the left upper quadrant but no rebound or rigidity  Liver and spleen: No hepatomegaly or splenomegaly  Examination for hernias: No hernia appreciated  Stool sample for occult blood: Abnormal   Hemoccults were positive  Genitourinary Patient goes for yearly Pap and pelvic exam    Lymphatic   Palpation of lymph nodes in neck: No lymphadenopathy  Palpation of lymph nodes in axillae: No lymphadenopathy  Palpation of lymph nodes in groin: No lymphadenopathy  Palpation of lymph nodes in other areas: No lymphadenopathy  Musculoskeletal   Gait and station: Normal     Digits and nails: Normal without clubbing or cyanosis      Joints, bones, and muscles: Normal     Range of motion: Normal     Stability: Normal     Muscle strength/tone: Normal     Skin   Skin and subcutaneous tissue: Normal without rashes or lesions  Palpation of skin and subcutaneous tissue: Normal turgor  Neurologic   Cranial nerves: Cranial nerves II-XII intact  Cortical function: Normal mental status  Reflexes: 2+ and symmetric  Sensation: No sensory loss  Coordination: Normal finger to nose and heel to shin  Psychiatric   Judgment and insight: Normal     Orientation to person, place, and time: Normal     Recent and remote memory: Intact      Mood and affect: Normal        Future Appointments    Date/Time Provider Specialty Site   06/15/2017 01:00 PM Francia Harris DO Internal Medicine [facility] INTERNAL MED     Signatures   Electronically signed by : Raghavendra Varma DO; Feb 16 2017  1:42PM EST                       (Author)

## 2018-01-22 VITALS
WEIGHT: 144.38 LBS | OXYGEN SATURATION: 98 % | HEART RATE: 78 BPM | TEMPERATURE: 97.3 F | BODY MASS INDEX: 24.06 KG/M2 | DIASTOLIC BLOOD PRESSURE: 72 MMHG | HEIGHT: 65 IN | SYSTOLIC BLOOD PRESSURE: 124 MMHG

## 2018-01-22 VITALS
DIASTOLIC BLOOD PRESSURE: 80 MMHG | RESPIRATION RATE: 16 BRPM | HEART RATE: 73 BPM | OXYGEN SATURATION: 98 % | HEIGHT: 65 IN | TEMPERATURE: 96.6 F | SYSTOLIC BLOOD PRESSURE: 120 MMHG | BODY MASS INDEX: 23.66 KG/M2 | WEIGHT: 142 LBS

## 2018-02-26 ENCOUNTER — APPOINTMENT (OUTPATIENT)
Dept: LAB | Facility: HOSPITAL | Age: 63
End: 2018-02-26
Payer: COMMERCIAL

## 2018-02-26 DIAGNOSIS — I10 ESSENTIAL (PRIMARY) HYPERTENSION: ICD-10-CM

## 2018-02-26 LAB
ALBUMIN SERPL BCP-MCNC: 3.8 G/DL (ref 3.5–5)
ALP SERPL-CCNC: 48 U/L (ref 46–116)
ALT SERPL W P-5'-P-CCNC: 35 U/L (ref 12–78)
ANION GAP SERPL CALCULATED.3IONS-SCNC: 5 MMOL/L (ref 4–13)
AST SERPL W P-5'-P-CCNC: 18 U/L (ref 5–45)
BACTERIA UR QL AUTO: NORMAL /HPF
BASOPHILS # BLD AUTO: 0.04 THOUSANDS/ΜL (ref 0–0.1)
BASOPHILS NFR BLD AUTO: 1 % (ref 0–1)
BILIRUB SERPL-MCNC: 0.61 MG/DL (ref 0.2–1)
BILIRUB UR QL STRIP: NEGATIVE
BUN SERPL-MCNC: 15 MG/DL (ref 5–25)
CALCIUM SERPL-MCNC: 8.9 MG/DL (ref 8.3–10.1)
CHLORIDE SERPL-SCNC: 106 MMOL/L (ref 100–108)
CHOLEST SERPL-MCNC: 166 MG/DL (ref 50–200)
CLARITY UR: CLEAR
CO2 SERPL-SCNC: 29 MMOL/L (ref 21–32)
COLOR UR: YELLOW
CREAT SERPL-MCNC: 0.87 MG/DL (ref 0.6–1.3)
EOSINOPHIL # BLD AUTO: 0.2 THOUSAND/ΜL (ref 0–0.61)
EOSINOPHIL NFR BLD AUTO: 4 % (ref 0–6)
ERYTHROCYTE [DISTWIDTH] IN BLOOD BY AUTOMATED COUNT: 12 % (ref 11.6–15.1)
GFR SERPL CREATININE-BSD FRML MDRD: 73 ML/MIN/1.73SQ M
GLUCOSE P FAST SERPL-MCNC: 93 MG/DL (ref 65–99)
GLUCOSE UR STRIP-MCNC: NEGATIVE MG/DL
HCT VFR BLD AUTO: 40.9 % (ref 34.8–46.1)
HDLC SERPL-MCNC: 60 MG/DL (ref 40–60)
HGB BLD-MCNC: 13.8 G/DL (ref 11.5–15.4)
HGB UR QL STRIP.AUTO: NEGATIVE
HYALINE CASTS #/AREA URNS LPF: NORMAL /LPF
KETONES UR STRIP-MCNC: NEGATIVE MG/DL
LDLC SERPL CALC-MCNC: 81 MG/DL (ref 0–100)
LEUKOCYTE ESTERASE UR QL STRIP: ABNORMAL
LYMPHOCYTES # BLD AUTO: 2.46 THOUSANDS/ΜL (ref 0.6–4.47)
LYMPHOCYTES NFR BLD AUTO: 43 % (ref 14–44)
MCH RBC QN AUTO: 30.3 PG (ref 26.8–34.3)
MCHC RBC AUTO-ENTMCNC: 33.7 G/DL (ref 31.4–37.4)
MCV RBC AUTO: 90 FL (ref 82–98)
MONOCYTES # BLD AUTO: 0.48 THOUSAND/ΜL (ref 0.17–1.22)
MONOCYTES NFR BLD AUTO: 9 % (ref 4–12)
NEUTROPHILS # BLD AUTO: 2.37 THOUSANDS/ΜL (ref 1.85–7.62)
NEUTS SEG NFR BLD AUTO: 43 % (ref 43–75)
NITRITE UR QL STRIP: NEGATIVE
NON-SQ EPI CELLS URNS QL MICRO: NORMAL /HPF
NRBC BLD AUTO-RTO: 0 /100 WBCS
PH UR STRIP.AUTO: 5.5 [PH] (ref 4.5–8)
PLATELET # BLD AUTO: 291 THOUSANDS/UL (ref 149–390)
PMV BLD AUTO: 9.9 FL (ref 8.9–12.7)
POTASSIUM SERPL-SCNC: 4.4 MMOL/L (ref 3.5–5.3)
PROT SERPL-MCNC: 7.2 G/DL (ref 6.4–8.2)
PROT UR STRIP-MCNC: NEGATIVE MG/DL
RBC # BLD AUTO: 4.56 MILLION/UL (ref 3.81–5.12)
RBC #/AREA URNS AUTO: NORMAL /HPF
SODIUM SERPL-SCNC: 140 MMOL/L (ref 136–145)
SP GR UR STRIP.AUTO: 1.01 (ref 1–1.03)
TRIGL SERPL-MCNC: 124 MG/DL
TSH SERPL DL<=0.05 MIU/L-ACNC: 1.56 UIU/ML (ref 0.36–3.74)
UROBILINOGEN UR QL STRIP.AUTO: 0.2 E.U./DL
WBC # BLD AUTO: 5.56 THOUSAND/UL (ref 4.31–10.16)
WBC #/AREA URNS AUTO: NORMAL /HPF

## 2018-02-26 PROCEDURE — 85025 COMPLETE CBC W/AUTO DIFF WBC: CPT

## 2018-02-26 PROCEDURE — 80061 LIPID PANEL: CPT

## 2018-02-26 PROCEDURE — 80053 COMPREHEN METABOLIC PANEL: CPT

## 2018-02-26 PROCEDURE — 84443 ASSAY THYROID STIM HORMONE: CPT

## 2018-02-26 PROCEDURE — 81001 URINALYSIS AUTO W/SCOPE: CPT

## 2018-02-26 PROCEDURE — 36415 COLL VENOUS BLD VENIPUNCTURE: CPT

## 2018-02-28 ENCOUNTER — OFFICE VISIT (OUTPATIENT)
Dept: INTERNAL MEDICINE CLINIC | Facility: CLINIC | Age: 63
End: 2018-02-28
Payer: COMMERCIAL

## 2018-02-28 VITALS
TEMPERATURE: 97.7 F | HEART RATE: 74 BPM | HEIGHT: 65 IN | BODY MASS INDEX: 23.82 KG/M2 | DIASTOLIC BLOOD PRESSURE: 66 MMHG | SYSTOLIC BLOOD PRESSURE: 118 MMHG | WEIGHT: 143 LBS | OXYGEN SATURATION: 98 %

## 2018-02-28 DIAGNOSIS — I10 ESSENTIAL HYPERTENSION: Primary | ICD-10-CM

## 2018-02-28 PROBLEM — K63.5 HYPERPLASTIC POLYP OF ASCENDING COLON: Status: ACTIVE | Noted: 2018-02-28

## 2018-02-28 PROBLEM — Z00.00 HEALTHCARE MAINTENANCE: Status: ACTIVE | Noted: 2018-02-28

## 2018-02-28 PROCEDURE — 99396 PREV VISIT EST AGE 40-64: CPT | Performed by: INTERNAL MEDICINE

## 2018-02-28 RX ORDER — ATORVASTATIN CALCIUM 10 MG/1
TABLET, FILM COATED ORAL
COMMUNITY
Start: 2013-08-14 | End: 2018-08-03 | Stop reason: SDUPTHER

## 2018-02-28 RX ORDER — BENZONATATE 100 MG/1
1 CAPSULE ORAL 3 TIMES DAILY PRN
COMMUNITY
Start: 2018-01-02 | End: 2018-09-12 | Stop reason: ALTCHOICE

## 2018-02-28 RX ORDER — OMEPRAZOLE 20 MG/1
1 CAPSULE, DELAYED RELEASE ORAL DAILY PRN
COMMUNITY
Start: 2017-02-16 | End: 2019-02-09 | Stop reason: SDUPTHER

## 2018-02-28 RX ORDER — LISINOPRIL 10 MG/1
1 TABLET ORAL DAILY
COMMUNITY
Start: 2014-11-05 | End: 2018-12-03 | Stop reason: SDUPTHER

## 2018-02-28 NOTE — PROGRESS NOTES
Assessment/Plan:    Hypertension  Hypertension  Patient has a history of benign essential hypertension  As noted patient's blood pressure showing excellent control with his visit today  Patient will continue with present medication and she will return to the office to have a checked in approximately 6 months  Patient has had no adverse effects from the medication    Hyperlipidemia  Hyperlipidemia  Patient has a history of hyperlipidemia, family history of coronary artery disease  As noted patient's cholesterol is under excellent control and we will continue surveillance and modification of treatment as necessary  She states she is not always perfect with her diet her and I did advise her to watch her intake of fats and cholesterol  Hyperplastic polyp of ascending colon  Hyperplastic polyp  Patient has a history of a hyperplastic polyp that was found on routine colonoscopy which is been performed x2  Patient is due in in the future for repeat colonoscopy and she was told if not notified by her colon rectal specialist to please call so that we can expedite an appointment  Diagnoses and all orders for this visit:    Essential hypertension    Other orders  -     atorvastatin (LIPITOR) 10 mg tablet; Take by mouth  -     benzonatate (TESSALON PERLES) 100 mg capsule; Take 1 capsule by mouth 3 (three) times a day as needed  -     omeprazole (PriLOSEC) 20 mg delayed release capsule; Take 1 capsule by mouth daily as needed  -     lisinopril (ZESTRIL) 10 mg tablet; Take 1 tablet by mouth daily          Subjective:      Patient ID: Prince Parish is a 63 y o  female  Patient is a 56-year-old female with a history of hypertension, hyperlipidemia, family history of coronary artery disease  Patient is here today for routine physical   She did have complete labs performed prior to the visit today we did discuss the results which were all normal   Patient has had no new medical problems or complaints    She is due for a routine repeat colonoscopy in the near future for re-evaluation of the finding of a hyperplastic polyp of the colon  The following portions of the patient's history were reviewed and updated as appropriate:   She  has no past medical history on file  She   Patient Active Problem List    Diagnosis Date Noted    Hyperplastic polyp of ascending colon 2018    Healthcare maintenance 2018    Hyperlipidemia 2013    Hypertension 2012     She  has a past surgical history that includes Hysteroscopy w/ endometrial ablation and  section  Her family history is not on file  She  reports that she has never smoked  She does not have any smokeless tobacco history on file  Her alcohol and drug histories are not on file  Current Outpatient Prescriptions   Medication Sig Dispense Refill    atorvastatin (LIPITOR) 10 mg tablet Take by mouth      benzonatate (TESSALON PERLES) 100 mg capsule Take 1 capsule by mouth 3 (three) times a day as needed      lisinopril (ZESTRIL) 10 mg tablet Take 1 tablet by mouth daily      omeprazole (PriLOSEC) 20 mg delayed release capsule Take 1 capsule by mouth daily as needed       No current facility-administered medications for this visit  No current outpatient prescriptions on file prior to visit  No current facility-administered medications on file prior to visit  She has No Known Allergies       Review of Systems   Constitutional: Negative  HENT: Negative  Eyes: Negative  Respiratory: Negative  Cardiovascular: Negative  Gastrointestinal: Negative  Endocrine: Negative  Genitourinary: Negative  Musculoskeletal: Positive for back pain (Patient states that over the past few days she has had some mild low back pain after working on projects at home  She denies any weakness in the lower extremities or paresthesias)  Negative for arthralgias, gait problem, joint swelling, myalgias and neck pain     Skin: Negative  Allergic/Immunologic: Negative  Neurological: Negative  Hematological: Negative  Psychiatric/Behavioral: Negative  Objective:      /66   Pulse 74   Temp 97 7 °F (36 5 °C)   Ht 5' 5" (1 651 m)   Wt 64 9 kg (143 lb)   SpO2 98%   BMI 23 80 kg/m²          Physical Exam   Constitutional: She is oriented to person, place, and time  She appears well-developed and well-nourished  No distress  Pleasant cheerful, healthy-appearing 73-year-old female who is awake alert no acute distress   HENT:   Head: Normocephalic and atraumatic  Right Ear: External ear normal    Left Ear: External ear normal    Nose: Nose normal    Mouth/Throat: Oropharynx is clear and moist  No oropharyngeal exudate  Eyes: Conjunctivae and EOM are normal  Pupils are equal, round, and reactive to light  Right eye exhibits no discharge  Left eye exhibits no discharge  No scleral icterus  Neck: Normal range of motion  Neck supple  No JVD present  No tracheal deviation present  No thyromegaly present  Cardiovascular: Normal rate, regular rhythm, normal heart sounds and intact distal pulses  Exam reveals no gallop and no friction rub  No murmur heard  Pulmonary/Chest: Effort normal and breath sounds normal  No stridor  No respiratory distress  She has no wheezes  She has no rales  She exhibits no tenderness  Abdominal: Soft  Bowel sounds are normal  She exhibits no distension  There is no tenderness  There is no rebound and no guarding  Musculoskeletal: Normal range of motion  She exhibits deformity  She exhibits no edema or tenderness  Evaluation of her back shows slight flattening of her lumbar curve but on evaluation she does have full range of motion both passively and actively without discomfort   Lymphadenopathy:     She has no cervical adenopathy  Neurological: She is alert and oriented to person, place, and time  She has normal reflexes  She displays normal reflexes   No cranial nerve deficit  She exhibits normal muscle tone  Coordination normal    Skin: Skin is warm and dry  No rash noted  She is not diaphoretic  No erythema  No pallor  Psychiatric: She has a normal mood and affect  Her behavior is normal  Thought content normal    Nursing note and vitals reviewed

## 2018-02-28 NOTE — ASSESSMENT & PLAN NOTE
Hyperplastic polyp  Patient has a history of a hyperplastic polyp that was found on routine colonoscopy which is been performed x2  Patient is due in in the future for repeat colonoscopy and she was told if not notified by her colon rectal specialist to please call so that we can expedite an appointment

## 2018-02-28 NOTE — ASSESSMENT & PLAN NOTE
Hypertension  Patient has a history of benign essential hypertension  As noted patient's blood pressure showing excellent control with his visit today  Patient will continue with present medication and she will return to the office to have a checked in approximately 6 months    Patient has had no adverse effects from the medication

## 2018-02-28 NOTE — ASSESSMENT & PLAN NOTE
Hyperlipidemia  Patient has a history of hyperlipidemia, family history of coronary artery disease  As noted patient's cholesterol is under excellent control and we will continue surveillance and modification of treatment as necessary  She states she is not always perfect with her diet her and I did advise her to watch her intake of fats and cholesterol

## 2018-08-03 DIAGNOSIS — E78.01 FAMILIAL HYPERCHOLESTEROLEMIA: Primary | ICD-10-CM

## 2018-08-03 RX ORDER — ATORVASTATIN CALCIUM 10 MG/1
TABLET, FILM COATED ORAL
Qty: 90 TABLET | Refills: 2 | Status: SHIPPED | OUTPATIENT
Start: 2018-08-03 | End: 2019-10-15 | Stop reason: SDUPTHER

## 2018-09-12 ENCOUNTER — OFFICE VISIT (OUTPATIENT)
Dept: INTERNAL MEDICINE CLINIC | Facility: CLINIC | Age: 63
End: 2018-09-12
Payer: COMMERCIAL

## 2018-09-12 VITALS
TEMPERATURE: 98.2 F | OXYGEN SATURATION: 98 % | BODY MASS INDEX: 24.66 KG/M2 | HEART RATE: 77 BPM | SYSTOLIC BLOOD PRESSURE: 134 MMHG | HEIGHT: 65 IN | DIASTOLIC BLOOD PRESSURE: 82 MMHG | WEIGHT: 148 LBS

## 2018-09-12 DIAGNOSIS — Z12.11 COLON CANCER SCREENING: ICD-10-CM

## 2018-09-12 DIAGNOSIS — Z23 NEED FOR INFLUENZA VACCINATION: ICD-10-CM

## 2018-09-12 DIAGNOSIS — K63.5 HYPERPLASTIC POLYP OF ASCENDING COLON: Primary | ICD-10-CM

## 2018-09-12 DIAGNOSIS — Z00.00 HEALTHCARE MAINTENANCE: ICD-10-CM

## 2018-09-12 DIAGNOSIS — K29.00 ACUTE SUPERFICIAL GASTRITIS WITHOUT HEMORRHAGE: ICD-10-CM

## 2018-09-12 DIAGNOSIS — I10 ESSENTIAL HYPERTENSION: ICD-10-CM

## 2018-09-12 DIAGNOSIS — E78.01 FAMILIAL HYPERCHOLESTEROLEMIA: ICD-10-CM

## 2018-09-12 PROCEDURE — 3075F SYST BP GE 130 - 139MM HG: CPT | Performed by: INTERNAL MEDICINE

## 2018-09-12 PROCEDURE — 1036F TOBACCO NON-USER: CPT | Performed by: INTERNAL MEDICINE

## 2018-09-12 PROCEDURE — 90682 RIV4 VACC RECOMBINANT DNA IM: CPT

## 2018-09-12 PROCEDURE — 3079F DIAST BP 80-89 MM HG: CPT | Performed by: INTERNAL MEDICINE

## 2018-09-12 PROCEDURE — 3008F BODY MASS INDEX DOCD: CPT | Performed by: INTERNAL MEDICINE

## 2018-09-12 PROCEDURE — 99214 OFFICE O/P EST MOD 30 MIN: CPT | Performed by: INTERNAL MEDICINE

## 2018-09-12 NOTE — PROGRESS NOTES
Assessment/Plan:    Hypertension   As noted patient's blood pressure is showing relatively good control but her readings have been extremely variable  At this point time we discussed with the patient keeping her on present dosage of Zestril and if her blood pressure shows elevation in the future visits modification of treatment  We discussed with the patient the importance of watching her diet in attempting to work at weight loss, decreasing her sodium intake  Hyperlipidemia    Patient does have a history of  Hyperlipidemia  She also has a history in the family of coronary artery disease  She remains on a low dose of Lipitor 10 mg a day  Patient again states that she is watching her diet and her intake of fats and cholesterol  We will check on a lipid profile with her next visit and make adjustments to medication if needed  Gastritis, acute    Patient has a history of recurrent problems with gastritis which seems to be superficial   At this point she remains on Prilosec 20 mg a day  Patient states that she has high undergone a lot of emotional stress recently and would prefer to stay on this drug  We will discuss with the patient again with her next visit stopping the medication or replacing it with an H2 blocker  Patient understands and was told if any acute exacerbations to call       Diagnoses and all orders for this visit:    Hyperplastic polyp of ascending colon  -     Occult Blood, Fecal Immunochemical; Future    Essential hypertension  -     Comprehensive metabolic panel; Future  -     CBC and differential; Future  -     Lipid panel; Future  -     TSH, 3rd generation with Free T4 reflex; Future  -     Urinalysis with reflex to microscopic; Future    Familial hypercholesterolemia  -     Comprehensive metabolic panel; Future  -     CBC and differential; Future  -     Lipid panel; Future  -     TSH, 3rd generation with Free T4 reflex;  Future  -     Urinalysis with reflex to microscopic; Future    Healthcare maintenance  -     Comprehensive metabolic panel; Future  -     CBC and differential; Future  -     Lipid panel; Future  -     TSH, 3rd generation with Free T4 reflex; Future  -     Urinalysis with reflex to microscopic; Future    Colon cancer screening  -     Occult Blood, Fecal Immunochemical; Future    Need for influenza vaccination  -     influenza vaccine, [id number], quadrivalent, recombinant, PF, 0 5 mL, for patients 18 yr+ (FLUBLOK)    Acute superficial gastritis without hemorrhage          Subjective:      Patient ID: Anne Beavers is a 63 y o  female  Patient is a delightful 82-year-old female with a history of hypertension, hyperlipidemia, strong family history of coronary artery disease  Patient is here today for routine follow-up after 6 month period of time  Patient states that medically she is feeling well but emotionally she has been having some difficulties with blood loss of a very close friend recently to cancer        The following portions of the patient's history were reviewed and updated as appropriate:   She  has no past medical history on file  She   Patient Active Problem List    Diagnosis Date Noted    Hyperplastic polyp of ascending colon 2018    Healthcare maintenance 2018    Gastritis, acute 2017    Hyperlipidemia 2013    Hypertension 2012     She  has a past surgical history that includes Hysteroscopy w/ endometrial ablation and  section  Her family history is not on file  She  reports that she has never smoked  She has never used smokeless tobacco  Her alcohol and drug histories are not on file    Current Outpatient Prescriptions   Medication Sig Dispense Refill    atorvastatin (LIPITOR) 10 mg tablet TAKE 1 TABLET BY MOUTH DAILY 90 tablet 2    lisinopril (ZESTRIL) 10 mg tablet Take 1 tablet by mouth daily      omeprazole (PriLOSEC) 20 mg delayed release capsule Take 1 capsule by mouth daily as needed       No current facility-administered medications for this visit  Current Outpatient Prescriptions on File Prior to Visit   Medication Sig    atorvastatin (LIPITOR) 10 mg tablet TAKE 1 TABLET BY MOUTH DAILY    lisinopril (ZESTRIL) 10 mg tablet Take 1 tablet by mouth daily    omeprazole (PriLOSEC) 20 mg delayed release capsule Take 1 capsule by mouth daily as needed    [DISCONTINUED] benzonatate (TESSALON PERLES) 100 mg capsule Take 1 capsule by mouth 3 (three) times a day as needed     No current facility-administered medications on file prior to visit  She is allergic to grass extracts [gramineae pollens]; mold extract [trichophyton]; and other       Review of Systems   Constitutional: Negative  HENT: Negative  Eyes: Negative  Respiratory: Negative  Cardiovascular: Negative  Gastrointestinal: Negative  Endocrine: Negative  Genitourinary: Negative  Musculoskeletal: Negative  Skin: Negative  Allergic/Immunologic: Negative  Neurological: Negative  Psychiatric/Behavioral: Negative  Objective:      /82   Pulse 77   Temp 98 2 °F (36 8 °C)   Ht 5' 5" (1 651 m)   Wt 67 1 kg (148 lb)   SpO2 98%   BMI 24 63 kg/m²          Physical Exam   Constitutional: She is oriented to person, place, and time  She appears well-developed and well-nourished  No distress  HENT:   Head: Normocephalic and atraumatic  Right Ear: External ear normal    Left Ear: External ear normal    Nose: Nose normal    Mouth/Throat: Oropharynx is clear and moist  No oropharyngeal exudate  Eyes: Conjunctivae and EOM are normal  Pupils are equal, round, and reactive to light  Right eye exhibits no discharge  Left eye exhibits no discharge  No scleral icterus  Neck: Normal range of motion  Neck supple  No JVD present  No tracheal deviation present  No thyromegaly present  Cardiovascular: Normal rate, regular rhythm, normal heart sounds and intact distal pulses    Exam reveals no gallop and no friction rub  No murmur heard  Pulmonary/Chest: Effort normal and breath sounds normal  No stridor  No respiratory distress  She has no wheezes  She has no rales  She exhibits no tenderness  Abdominal: Soft  Bowel sounds are normal  She exhibits no distension and no mass  There is no tenderness  There is no rebound and no guarding  Musculoskeletal: Normal range of motion  She exhibits no edema, tenderness or deformity  Lymphadenopathy:     She has no cervical adenopathy  Neurological: She is alert and oriented to person, place, and time  She has normal reflexes  She displays normal reflexes  No cranial nerve deficit  She exhibits normal muscle tone  Coordination normal    Skin: Skin is warm and dry  No rash noted  She is not diaphoretic  No erythema  No pallor  Psychiatric: She has a normal mood and affect  Her behavior is normal  Judgment and thought content normal    Nursing note and vitals reviewed

## 2018-09-12 NOTE — ASSESSMENT & PLAN NOTE
Patient has a history of recurrent problems with gastritis which seems to be superficial   At this point she remains on Prilosec 20 mg a day  Patient states that she has high undergone a lot of emotional stress recently and would prefer to stay on this drug  We will discuss with the patient again with her next visit stopping the medication or replacing it with an H2 blocker    Patient understands and was told if any acute exacerbations to call

## 2018-09-12 NOTE — ASSESSMENT & PLAN NOTE
Patient does have a history of  Hyperlipidemia  She also has a history in the family of coronary artery disease  She remains on a low dose of Lipitor 10 mg a day  Patient again states that she is watching her diet and her intake of fats and cholesterol  We will check on a lipid profile with her next visit and make adjustments to medication if needed

## 2018-09-12 NOTE — ASSESSMENT & PLAN NOTE
As noted patient's blood pressure is showing relatively good control but her readings have been extremely variable  At this point time we discussed with the patient keeping her on present dosage of Zestril and if her blood pressure shows elevation in the future visits modification of treatment  We discussed with the patient the importance of watching her diet in attempting to work at weight loss, decreasing her sodium intake

## 2018-11-21 ENCOUNTER — TELEPHONE (OUTPATIENT)
Dept: INTERNAL MEDICINE CLINIC | Facility: CLINIC | Age: 63
End: 2018-11-21

## 2018-11-21 DIAGNOSIS — L30.9 DERMATITIS: Primary | ICD-10-CM

## 2018-11-21 RX ORDER — METHYLPREDNISOLONE 4 MG/1
TABLET ORAL
Qty: 21 TABLET | Refills: 0 | Status: SHIPPED | OUTPATIENT
Start: 2018-11-21 | End: 2019-03-12 | Stop reason: ALTCHOICE

## 2018-11-21 NOTE — TELEPHONE ENCOUNTER
Pt called because she has an identical bilateral rash on her wrist/forearm  She is not sure if this is just due to seasonal changes/dry skin, but she noted that it was very itchy to the point where her skin broke and started to bleed a little  She noted that in the past you had given her a methylprednisolone pack (5/17/16 note) when she had a rash on her torso  She was wondering if you would be able to prescribe a medication for this or offer some advice  If Rx prescribed, she uses the [facility] on [address] which is already defaulted in her chart  I can also give her a call back at [phone] with any advice you could recommend  Thank you

## 2018-11-21 NOTE — PROGRESS NOTES
Prescription sent for the patient for rash to her forearm  Medrol Dosepak    Patient was told to call if not improving

## 2018-12-03 DIAGNOSIS — I10 ESSENTIAL HYPERTENSION: Primary | ICD-10-CM

## 2018-12-03 RX ORDER — LISINOPRIL 10 MG/1
TABLET ORAL
Qty: 90 TABLET | Refills: 3 | Status: SHIPPED | OUTPATIENT
Start: 2018-12-03 | End: 2019-11-22 | Stop reason: SDUPTHER

## 2019-01-28 DIAGNOSIS — R11.2 INTRACTABLE VOMITING WITH NAUSEA, UNSPECIFIED VOMITING TYPE: Primary | ICD-10-CM

## 2019-01-28 RX ORDER — ONDANSETRON 4 MG/1
4 TABLET, FILM COATED ORAL EVERY 8 HOURS PRN
Qty: 10 TABLET | Refills: 1 | Status: SHIPPED | OUTPATIENT
Start: 2019-01-28 | End: 2021-01-12

## 2019-01-28 NOTE — PROGRESS NOTES
Patient called  Apparently she has been suffering with a acute gastroenteritis since Friday night  Nausea and some vomiting, diarrhea  She she has been on a brat diet which seem to help initially but yesterday she again had some vomiting  Did not call the doctor on call over the weekend  She has no abdominal pain or cramping just queasy feeling  No black stools or blood in the stools  Prescription was sent for Zofran 4 mg to take every 8 hours as needed for nausea, she was told to take over-the-counter Imodium right ear to help with her diarrhea    Keep herself hydrated and call if not improving

## 2019-02-09 DIAGNOSIS — K21.00 GASTROESOPHAGEAL REFLUX DISEASE WITH ESOPHAGITIS: Primary | ICD-10-CM

## 2019-02-11 RX ORDER — OMEPRAZOLE 20 MG/1
CAPSULE, DELAYED RELEASE ORAL
Qty: 30 CAPSULE | Refills: 0 | Status: SHIPPED | OUTPATIENT
Start: 2019-02-11 | End: 2019-03-12

## 2019-02-18 ENCOUNTER — TELEPHONE (OUTPATIENT)
Dept: OBGYN CLINIC | Facility: CLINIC | Age: 64
End: 2019-02-18

## 2019-02-18 DIAGNOSIS — Z12.31 ENCOUNTER FOR SCREENING MAMMOGRAM FOR MALIGNANT NEOPLASM OF BREAST: Primary | ICD-10-CM

## 2019-02-18 NOTE — TELEPHONE ENCOUNTER
Patient left message on nurse line  States she needs a mammogram order  Annual scheduled for 4/29/19    Mammogram ordered  Routing to provider to sign

## 2019-03-05 ENCOUNTER — APPOINTMENT (OUTPATIENT)
Dept: LAB | Facility: HOSPITAL | Age: 64
End: 2019-03-05
Payer: COMMERCIAL

## 2019-03-05 DIAGNOSIS — Z12.11 COLON CANCER SCREENING: ICD-10-CM

## 2019-03-05 DIAGNOSIS — Z00.00 HEALTHCARE MAINTENANCE: ICD-10-CM

## 2019-03-05 DIAGNOSIS — E78.01 FAMILIAL HYPERCHOLESTEROLEMIA: ICD-10-CM

## 2019-03-05 DIAGNOSIS — K63.5 HYPERPLASTIC POLYP OF ASCENDING COLON: ICD-10-CM

## 2019-03-05 DIAGNOSIS — I10 ESSENTIAL HYPERTENSION: ICD-10-CM

## 2019-03-05 LAB
ALBUMIN SERPL BCP-MCNC: 3.9 G/DL (ref 3.5–5)
ALP SERPL-CCNC: 59 U/L (ref 46–116)
ALT SERPL W P-5'-P-CCNC: 42 U/L (ref 12–78)
ANION GAP SERPL CALCULATED.3IONS-SCNC: 3 MMOL/L (ref 4–13)
AST SERPL W P-5'-P-CCNC: 22 U/L (ref 5–45)
BACTERIA UR QL AUTO: NORMAL /HPF
BASOPHILS # BLD AUTO: 0.06 THOUSANDS/ΜL (ref 0–0.1)
BASOPHILS NFR BLD AUTO: 1 % (ref 0–1)
BILIRUB SERPL-MCNC: 0.68 MG/DL (ref 0.2–1)
BILIRUB UR QL STRIP: NEGATIVE
BUN SERPL-MCNC: 16 MG/DL (ref 5–25)
CALCIUM SERPL-MCNC: 8.7 MG/DL (ref 8.3–10.1)
CHLORIDE SERPL-SCNC: 103 MMOL/L (ref 100–108)
CHOLEST SERPL-MCNC: 188 MG/DL (ref 50–200)
CLARITY UR: CLEAR
CO2 SERPL-SCNC: 29 MMOL/L (ref 21–32)
COLOR UR: YELLOW
CREAT SERPL-MCNC: 0.91 MG/DL (ref 0.6–1.3)
EOSINOPHIL # BLD AUTO: 0.26 THOUSAND/ΜL (ref 0–0.61)
EOSINOPHIL NFR BLD AUTO: 4 % (ref 0–6)
ERYTHROCYTE [DISTWIDTH] IN BLOOD BY AUTOMATED COUNT: 11.9 % (ref 11.6–15.1)
GFR SERPL CREATININE-BSD FRML MDRD: 68 ML/MIN/1.73SQ M
GLUCOSE P FAST SERPL-MCNC: 94 MG/DL (ref 65–99)
GLUCOSE UR STRIP-MCNC: NEGATIVE MG/DL
HCT VFR BLD AUTO: 39.8 % (ref 34.8–46.1)
HDLC SERPL-MCNC: 54 MG/DL (ref 40–60)
HEMOCCULT STL QL IA: NEGATIVE
HGB BLD-MCNC: 12.8 G/DL (ref 11.5–15.4)
HGB UR QL STRIP.AUTO: ABNORMAL
HYALINE CASTS #/AREA URNS LPF: NORMAL /LPF
IMM GRANULOCYTES # BLD AUTO: 0.02 THOUSAND/UL (ref 0–0.2)
IMM GRANULOCYTES NFR BLD AUTO: 0 % (ref 0–2)
KETONES UR STRIP-MCNC: NEGATIVE MG/DL
LDLC SERPL CALC-MCNC: 105 MG/DL (ref 0–100)
LEUKOCYTE ESTERASE UR QL STRIP: ABNORMAL
LYMPHOCYTES # BLD AUTO: 1.98 THOUSANDS/ΜL (ref 0.6–4.47)
LYMPHOCYTES NFR BLD AUTO: 30 % (ref 14–44)
MCH RBC QN AUTO: 29.7 PG (ref 26.8–34.3)
MCHC RBC AUTO-ENTMCNC: 32.2 G/DL (ref 31.4–37.4)
MCV RBC AUTO: 92 FL (ref 82–98)
MONOCYTES # BLD AUTO: 0.48 THOUSAND/ΜL (ref 0.17–1.22)
MONOCYTES NFR BLD AUTO: 7 % (ref 4–12)
NEUTROPHILS # BLD AUTO: 3.75 THOUSANDS/ΜL (ref 1.85–7.62)
NEUTS SEG NFR BLD AUTO: 58 % (ref 43–75)
NITRITE UR QL STRIP: NEGATIVE
NON-SQ EPI CELLS URNS QL MICRO: NORMAL /HPF
NONHDLC SERPL-MCNC: 134 MG/DL
NRBC BLD AUTO-RTO: 0 /100 WBCS
PH UR STRIP.AUTO: 5.5 [PH] (ref 4.5–8)
PLATELET # BLD AUTO: 280 THOUSANDS/UL (ref 149–390)
PMV BLD AUTO: 9.7 FL (ref 8.9–12.7)
POTASSIUM SERPL-SCNC: 3.9 MMOL/L (ref 3.5–5.3)
PROT SERPL-MCNC: 7 G/DL (ref 6.4–8.2)
PROT UR STRIP-MCNC: NEGATIVE MG/DL
RBC # BLD AUTO: 4.31 MILLION/UL (ref 3.81–5.12)
RBC #/AREA URNS AUTO: NORMAL /HPF
SODIUM SERPL-SCNC: 135 MMOL/L (ref 136–145)
SP GR UR STRIP.AUTO: 1.01 (ref 1–1.03)
TRIGL SERPL-MCNC: 146 MG/DL
TSH SERPL DL<=0.05 MIU/L-ACNC: 1.4 UIU/ML (ref 0.36–3.74)
UROBILINOGEN UR QL STRIP.AUTO: 0.2 E.U./DL
WBC # BLD AUTO: 6.55 THOUSAND/UL (ref 4.31–10.16)
WBC #/AREA URNS AUTO: NORMAL /HPF

## 2019-03-05 PROCEDURE — 80053 COMPREHEN METABOLIC PANEL: CPT

## 2019-03-05 PROCEDURE — 81001 URINALYSIS AUTO W/SCOPE: CPT

## 2019-03-05 PROCEDURE — 36415 COLL VENOUS BLD VENIPUNCTURE: CPT

## 2019-03-05 PROCEDURE — G0328 FECAL BLOOD SCRN IMMUNOASSAY: HCPCS

## 2019-03-05 PROCEDURE — 80061 LIPID PANEL: CPT

## 2019-03-05 PROCEDURE — 85025 COMPLETE CBC W/AUTO DIFF WBC: CPT

## 2019-03-05 PROCEDURE — 84443 ASSAY THYROID STIM HORMONE: CPT

## 2019-03-09 ENCOUNTER — HOSPITAL ENCOUNTER (OUTPATIENT)
Dept: RADIOLOGY | Age: 64
Discharge: HOME | End: 2019-03-09
Payer: COMMERCIAL

## 2019-03-09 VITALS — BODY MASS INDEX: 24.66 KG/M2 | HEIGHT: 65 IN | WEIGHT: 148 LBS

## 2019-03-09 DIAGNOSIS — Z12.31 ENCOUNTER FOR SCREENING MAMMOGRAM FOR MALIGNANT NEOPLASM OF BREAST: ICD-10-CM

## 2019-03-09 PROCEDURE — 77067 SCR MAMMO BI INCL CAD: CPT

## 2019-03-09 PROCEDURE — 77063 BREAST TOMOSYNTHESIS BI: CPT

## 2019-03-12 ENCOUNTER — OFFICE VISIT (OUTPATIENT)
Dept: INTERNAL MEDICINE CLINIC | Facility: CLINIC | Age: 64
End: 2019-03-12
Payer: COMMERCIAL

## 2019-03-12 VITALS
HEART RATE: 73 BPM | SYSTOLIC BLOOD PRESSURE: 134 MMHG | TEMPERATURE: 97.5 F | OXYGEN SATURATION: 98 % | WEIGHT: 147 LBS | BODY MASS INDEX: 24.49 KG/M2 | HEIGHT: 65 IN | DIASTOLIC BLOOD PRESSURE: 76 MMHG

## 2019-03-12 DIAGNOSIS — Z00.00 HEALTHCARE MAINTENANCE: ICD-10-CM

## 2019-03-12 DIAGNOSIS — K29.00 ACUTE SUPERFICIAL GASTRITIS WITHOUT HEMORRHAGE: ICD-10-CM

## 2019-03-12 DIAGNOSIS — E78.01 FAMILIAL HYPERCHOLESTEROLEMIA: Primary | ICD-10-CM

## 2019-03-12 DIAGNOSIS — I10 ESSENTIAL HYPERTENSION: ICD-10-CM

## 2019-03-12 PROCEDURE — 99396 PREV VISIT EST AGE 40-64: CPT | Performed by: INTERNAL MEDICINE

## 2019-03-12 NOTE — ASSESSMENT & PLAN NOTE
72-year-old female with a history of borderline hypertension, hyperlipidemia who is here today for routine follow-up, physical   She did have complete labs prior to the visit today we did discuss the results  The only abnormality is a slightly elevated LDL cholesterol but improved from previously  Patient is up-to-date with all routine screening including mammogram, colonoscopy, colon rectal screening, Pap and pelvic examinations  Patient states in general she is feeling well and has no new complaints or problems  She is compliant with her medication

## 2019-03-12 NOTE — ASSESSMENT & PLAN NOTE
Patient remains on atorvastatin at 10 mg 4 times per week  Again her LDL cholesterol is slightly elevated to 105 but her total cholesterol, HDL cholesterol triglyceride levels are normal   We told the patient that she should increase her atorvastatin to 10 mg 5 times per week and we will check a lipid profile with her next visit  She states that she is not always perfect with her diet but is trying to be compliant and limiting her intake of fats and cholesterol with her diet

## 2019-03-12 NOTE — PROGRESS NOTES
Assessment/Plan:    Healthcare maintenance  30-year-old female with a history of borderline hypertension, hyperlipidemia who is here today for routine follow-up, physical   She did have complete labs prior to the visit today we did discuss the results  The only abnormality is a slightly elevated LDL cholesterol but improved from previously  Patient is up-to-date with all routine screening including mammogram, colonoscopy, colon rectal screening, Pap and pelvic examinations  Patient states in general she is feeling well and has no new complaints or problems  She is compliant with her medication  Hyperlipidemia  Patient remains on atorvastatin at 10 mg 4 times per week  Again her LDL cholesterol is slightly elevated to 105 but her total cholesterol, HDL cholesterol triglyceride levels are normal   We told the patient that she should increase her atorvastatin to 10 mg 5 times per week and we will check a lipid profile with her next visit  She states that she is not always perfect with her diet but is trying to be compliant and limiting her intake of fats and cholesterol with her diet  Hypertension  The blood pressure is showing adequate control with present treatment  Patient's renal function is normal and was recently checked  We will continue to monitor blood pressure every 6 months and make adjustments to medication as needed  Gastritis, acute  Patient has a history in the past of acute gastritis  She states she intermittently still takes omeprazole 20 mg and she states that she takes this only at the most once a week and it does help her with her symptomatology  We did have a discussion with the patient about stopping the Prilosec and taking instead over-the-counter Zantac or Pepcid if she is symptomatic  She will call if she has persistent symptomatology  Diagnoses and all orders for this visit:    Familial hypercholesterolemia  -     Lipid panel;  Future    Essential hypertension    Healthcare maintenance    Acute superficial gastritis without hemorrhage          Subjective:      Patient ID: Nicci Samuel is a 66 y o  female  Pleasant 24-year-old female with a history of hypertension, hyperlipidemia, strong family history of diabetes, coronary artery disease  Patient is here today for routine physical   She did have extensive lab testing performed prior to the visit today and we did discuss the results at length with the patient  Patient states in general she is feeling well and has no new complaints or concerns  The following portions of the patient's history were reviewed and updated as appropriate:   She  has no past medical history on file  She   Patient Active Problem List    Diagnosis Date Noted    Hyperplastic polyp of ascending colon 2018    Healthcare maintenance 2018    Gastritis, acute 2017    Hyperlipidemia 2013    Hypertension 2012     She  has a past surgical history that includes Hysteroscopy w/ endometrial ablation and  section  Her family history includes Colon cancer (age of onset: 48) in her cousin; Lung cancer (age of onset: 48) in her cousin; Ovarian cancer (age of onset: 61) in her paternal aunt; Pancreatic cancer (age of onset: 66) in her paternal aunt  She  reports that she has never smoked  She has never used smokeless tobacco  Her alcohol and drug histories are not on file  Current Outpatient Medications   Medication Sig Dispense Refill    atorvastatin (LIPITOR) 10 mg tablet TAKE 1 TABLET BY MOUTH DAILY 90 tablet 2    lisinopril (ZESTRIL) 10 mg tablet TAKE 1 TABLET DAILY 90 tablet 3    ondansetron (ZOFRAN) 4 mg tablet Take 1 tablet (4 mg total) by mouth every 8 (eight) hours as needed for nausea or vomiting 10 tablet 1     No current facility-administered medications for this visit        Current Outpatient Medications on File Prior to Visit   Medication Sig    atorvastatin (LIPITOR) 10 mg tablet TAKE 1 TABLET BY MOUTH DAILY    lisinopril (ZESTRIL) 10 mg tablet TAKE 1 TABLET DAILY    ondansetron (ZOFRAN) 4 mg tablet Take 1 tablet (4 mg total) by mouth every 8 (eight) hours as needed for nausea or vomiting    [DISCONTINUED] omeprazole (PriLOSEC) 20 mg delayed release capsule TAKE 1 CAPSULE DAILY AS NEEDED    [DISCONTINUED] Methylprednisolone 4 MG TBPK Use as directed on package     No current facility-administered medications on file prior to visit  She is allergic to grass extracts [gramineae pollens]; mold extract [trichophyton]; and other       Review of Systems   Constitutional: Negative  HENT: Negative  Eyes: Negative  Respiratory: Negative  Cardiovascular: Negative  Gastrointestinal: Positive for constipation (Episodic constipation and she states she is supplementing her diet with more fiber to is been helpful)  Negative for abdominal distention, abdominal pain, anal bleeding, blood in stool, diarrhea, nausea and rectal pain  Patient states that she has some very rare GI upset that responds well to her taking Prilosec   Endocrine: Negative  Genitourinary: Negative  Musculoskeletal: Negative  Allergic/Immunologic: Negative  Neurological: Negative  Hematological: Negative  Objective:      /76   Pulse 73   Temp 97 5 °F (36 4 °C)   Ht 5' 5" (1 651 m)   Wt 66 7 kg (147 lb)   SpO2 98%   BMI 24 46 kg/m²          Physical Exam   Constitutional: She is oriented to person, place, and time  She appears well-developed and well-nourished  No distress  Patient is very cheerful, healthy-appearing 68-year-old female who is awake alert no acute distress and oriented times per   HENT:   Head: Normocephalic and atraumatic  Right Ear: External ear normal    Left Ear: External ear normal    Nose: Nose normal    Mouth/Throat: Oropharynx is clear and moist  No oropharyngeal exudate  Eyes: Pupils are equal, round, and reactive to light   Conjunctivae and EOM are normal  Right eye exhibits no discharge  Left eye exhibits no discharge  No scleral icterus  Neck: Normal range of motion  Neck supple  No JVD present  No tracheal deviation present  No thyromegaly present  Cardiovascular: Normal rate, regular rhythm, normal heart sounds and intact distal pulses  Exam reveals no gallop and no friction rub  No murmur heard  Pulmonary/Chest: Effort normal and breath sounds normal  No stridor  No respiratory distress  She has no wheezes  She has no rales  She exhibits no tenderness  Abdominal: Soft  Bowel sounds are normal  She exhibits no distension and no mass  There is no tenderness  There is no rebound and no guarding  No hernia  Genitourinary: Rectal exam shows guaiac negative stool  Genitourinary Comments: Patient continues to go for routine mammograms, Pap and pelvic examination with her gynecologist   Musculoskeletal: Normal range of motion  She exhibits no edema, tenderness or deformity  Lymphadenopathy:     She has no cervical adenopathy  Neurological: She is alert and oriented to person, place, and time  She displays normal reflexes  No cranial nerve deficit or sensory deficit  She exhibits normal muscle tone  Coordination normal    Skin: Skin is warm and dry  Capillary refill takes less than 2 seconds  No rash noted  She is not diaphoretic  No erythema  No pallor  Psychiatric: She has a normal mood and affect  Her behavior is normal  Thought content normal    Nursing note and vitals reviewed

## 2019-03-12 NOTE — ASSESSMENT & PLAN NOTE
Patient has a history in the past of acute gastritis  She states she intermittently still takes omeprazole 20 mg and she states that she takes this only at the most once a week and it does help her with her symptomatology  We did have a discussion with the patient about stopping the Prilosec and taking instead over-the-counter Zantac or Pepcid if she is symptomatic  She will call if she has persistent symptomatology

## 2019-03-12 NOTE — ASSESSMENT & PLAN NOTE
The blood pressure is showing adequate control with present treatment  Patient's renal function is normal and was recently checked  We will continue to monitor blood pressure every 6 months and make adjustments to medication as needed

## 2019-04-29 ENCOUNTER — ANNUAL EXAM (OUTPATIENT)
Dept: OBGYN CLINIC | Facility: CLINIC | Age: 64
End: 2019-04-29
Payer: COMMERCIAL

## 2019-04-29 VITALS
BODY MASS INDEX: 24.11 KG/M2 | DIASTOLIC BLOOD PRESSURE: 84 MMHG | SYSTOLIC BLOOD PRESSURE: 132 MMHG | WEIGHT: 150 LBS | HEIGHT: 66 IN

## 2019-04-29 DIAGNOSIS — Z12.39 SCREENING FOR MALIGNANT NEOPLASM OF BREAST: ICD-10-CM

## 2019-04-29 DIAGNOSIS — Z01.419 WELL WOMAN EXAM WITH ROUTINE GYNECOLOGICAL EXAM: Primary | ICD-10-CM

## 2019-04-29 PROBLEM — M19.049 LOCALIZED, PRIMARY OSTEOARTHRITIS OF HAND: Status: ACTIVE | Noted: 2018-11-21

## 2019-04-29 PROCEDURE — 99396 PREV VISIT EST AGE 40-64: CPT | Performed by: OBSTETRICS & GYNECOLOGY

## 2019-05-06 ENCOUNTER — OFFICE VISIT (OUTPATIENT)
Dept: INTERNAL MEDICINE CLINIC | Facility: CLINIC | Age: 64
End: 2019-05-06
Payer: COMMERCIAL

## 2019-05-06 VITALS
HEART RATE: 79 BPM | TEMPERATURE: 98.9 F | OXYGEN SATURATION: 98 % | WEIGHT: 148 LBS | SYSTOLIC BLOOD PRESSURE: 110 MMHG | DIASTOLIC BLOOD PRESSURE: 70 MMHG | HEIGHT: 66 IN | BODY MASS INDEX: 23.78 KG/M2

## 2019-05-06 DIAGNOSIS — I10 ESSENTIAL HYPERTENSION: ICD-10-CM

## 2019-05-06 DIAGNOSIS — J01.40 ACUTE PANSINUSITIS, RECURRENCE NOT SPECIFIED: Primary | ICD-10-CM

## 2019-05-06 DIAGNOSIS — J01.11 ACUTE RECURRENT FRONTAL SINUSITIS: ICD-10-CM

## 2019-05-06 PROCEDURE — 3074F SYST BP LT 130 MM HG: CPT | Performed by: INTERNAL MEDICINE

## 2019-05-06 PROCEDURE — 3078F DIAST BP <80 MM HG: CPT | Performed by: INTERNAL MEDICINE

## 2019-05-06 PROCEDURE — 1036F TOBACCO NON-USER: CPT | Performed by: INTERNAL MEDICINE

## 2019-05-06 PROCEDURE — 3008F BODY MASS INDEX DOCD: CPT | Performed by: INTERNAL MEDICINE

## 2019-05-06 PROCEDURE — 99213 OFFICE O/P EST LOW 20 MIN: CPT | Performed by: INTERNAL MEDICINE

## 2019-05-06 RX ORDER — AMOXICILLIN AND CLAVULANATE POTASSIUM 500; 125 MG/1; MG/1
1 TABLET, FILM COATED ORAL EVERY 8 HOURS SCHEDULED
Qty: 21 TABLET | Refills: 0 | Status: SHIPPED | OUTPATIENT
Start: 2019-05-06 | End: 2019-05-13

## 2019-09-03 ENCOUNTER — APPOINTMENT (OUTPATIENT)
Dept: LAB | Facility: CLINIC | Age: 64
End: 2019-09-03
Payer: COMMERCIAL

## 2019-09-03 DIAGNOSIS — E78.01 FAMILIAL HYPERCHOLESTEROLEMIA: ICD-10-CM

## 2019-09-03 LAB
CHOLEST SERPL-MCNC: 181 MG/DL (ref 50–200)
HDLC SERPL-MCNC: 49 MG/DL (ref 40–60)
LDLC SERPL CALC-MCNC: 109 MG/DL (ref 0–100)
NONHDLC SERPL-MCNC: 132 MG/DL
TRIGL SERPL-MCNC: 115 MG/DL

## 2019-09-03 PROCEDURE — 80061 LIPID PANEL: CPT

## 2019-09-03 PROCEDURE — 36415 COLL VENOUS BLD VENIPUNCTURE: CPT

## 2019-09-09 ENCOUNTER — OFFICE VISIT (OUTPATIENT)
Dept: INTERNAL MEDICINE CLINIC | Facility: CLINIC | Age: 64
End: 2019-09-09
Payer: COMMERCIAL

## 2019-09-09 VITALS
BODY MASS INDEX: 23.95 KG/M2 | TEMPERATURE: 98.1 F | HEART RATE: 77 BPM | WEIGHT: 149 LBS | DIASTOLIC BLOOD PRESSURE: 72 MMHG | OXYGEN SATURATION: 98 % | SYSTOLIC BLOOD PRESSURE: 128 MMHG | HEIGHT: 66 IN

## 2019-09-09 DIAGNOSIS — I10 ESSENTIAL HYPERTENSION: Primary | ICD-10-CM

## 2019-09-09 DIAGNOSIS — R07.89 LEFT CHEST PRESSURE: ICD-10-CM

## 2019-09-09 DIAGNOSIS — E78.01 FAMILIAL HYPERCHOLESTEROLEMIA: ICD-10-CM

## 2019-09-09 PROCEDURE — 3078F DIAST BP <80 MM HG: CPT | Performed by: INTERNAL MEDICINE

## 2019-09-09 PROCEDURE — 93000 ELECTROCARDIOGRAM COMPLETE: CPT | Performed by: INTERNAL MEDICINE

## 2019-09-09 PROCEDURE — 99214 OFFICE O/P EST MOD 30 MIN: CPT | Performed by: INTERNAL MEDICINE

## 2019-09-09 PROCEDURE — 3074F SYST BP LT 130 MM HG: CPT | Performed by: INTERNAL MEDICINE

## 2019-09-09 NOTE — ASSESSMENT & PLAN NOTE
Hypertension is controlled  Patient will continue present medication and surveillance    Patient's renal function has been normal

## 2019-09-09 NOTE — PROGRESS NOTES
Assessment/Plan:    Left chest pressure  Patient is reporting episodes of left-sided chest pressure sometimes radiating to her left arm  She she denies any increasing shortness of breath  She states the pressure does not happen necessarily with exertion  She does have a history of hyperlipidemia, hypertension, family history of coronary artery disease  We did perform an EKG in the office today showing no abnormalities whatsoever but we did state for completion the patient should have a routine treadmill cardiac stress test and this will be arranged  Patient is recently retired and will be increasing her exercise program with and she is commended for this  We want to make sure that there is no evidence of heart disease prior to being more aggressive worse her exercise regime  Blood pressure is controlled today cholesterol has been under excellent control with present treatment  Patient will return to the office after stress test is obtained    Hyperlipidemia  Patient does have a history of hyperlipidemia  She remains on atorvastatin 10 mg a day  Apparently she reported to the medical assistant that she is taking the medication every other day  We will continue to monitor her cholesterol profile and make adjustments to medication if needed  Patient was told to watch her intake of fats and cholesterol with her diet    Hypertension  Hypertension is controlled  Patient will continue present medication and surveillance  Patient's renal function has been normal        Diagnoses and all orders for this visit:    Essential hypertension  -     Stress test only, exercise; Future    Familial hypercholesterolemia  -     Stress test only, exercise; Future    Left chest pressure  -     Stress test only, exercise; Future  -     POCT ECG          Subjective:      Patient ID: Mau Ho is a 66 y o  female  Patient is a 30-year-old female with a history of multiple medical problems as outlined previously    Patient is here today for routine follow-up  She did have labs performed prior to the visit today looking at her cholesterol which is showing adequate control  Patient states in general she is feeling well and is extremely excited because of recent assisted and planning for a vacation in [facility] in the near future  She states that she has been having some episodes of left-sided chest pressure not related to exertion or activity  Otherwise is feeling well no shortness of breath with exertion  Watching her diet but not always perfect      The following portions of the patient's history were reviewed and updated as appropriate:   She  has a past medical history of Hyperlipemia and Hypertension  She   Patient Active Problem List    Diagnosis Date Noted    Left chest pressure 2019    Acute recurrent frontal sinusitis 2019    Localized, primary osteoarthritis of hand 2018    Hyperplastic polyp of ascending colon 2018    Healthcare maintenance 2018    Gastritis, acute 2017    Hyperlipidemia 2013    Hypertension 2012     She  has a past surgical history that includes Hysteroscopy w/ endometrial ablation and  section  Her family history includes Colon cancer (age of onset: 48) in her cousin; Lung cancer (age of onset: 48) in her cousin; Ovarian cancer (age of onset: 61) in her paternal aunt; Pancreatic cancer (age of onset: 66) in her paternal aunt  She  reports that she has never smoked  She has never used smokeless tobacco  She reports that she does not use drugs  Her alcohol history is not on file    Current Outpatient Medications   Medication Sig Dispense Refill    atorvastatin (LIPITOR) 10 mg tablet TAKE 1 TABLET BY MOUTH DAILY (Patient taking differently: 10 mg every other day ) 90 tablet 2    lisinopril (ZESTRIL) 10 mg tablet TAKE 1 TABLET DAILY 90 tablet 3    ondansetron (ZOFRAN) 4 mg tablet Take 1 tablet (4 mg total) by mouth every 8 (eight) hours as needed for nausea or vomiting (Patient not taking: Reported on 9/9/2019) 10 tablet 1     No current facility-administered medications for this visit  Current Outpatient Medications on File Prior to Visit   Medication Sig    atorvastatin (LIPITOR) 10 mg tablet TAKE 1 TABLET BY MOUTH DAILY (Patient taking differently: 10 mg every other day )    lisinopril (ZESTRIL) 10 mg tablet TAKE 1 TABLET DAILY    ondansetron (ZOFRAN) 4 mg tablet Take 1 tablet (4 mg total) by mouth every 8 (eight) hours as needed for nausea or vomiting (Patient not taking: Reported on 9/9/2019)     No current facility-administered medications on file prior to visit  She is allergic to grass extracts [gramineae pollens]; mold extract [trichophyton]; and other       Review of Systems   Constitutional: Positive for activity change (Patient states since intermediate she is walking on a daily basis, no chest pain or pressure with exertion)  Negative for appetite change, chills, diaphoresis, fatigue, fever and unexpected weight change  HENT: Negative  Eyes: Negative  Respiratory: Positive for chest tightness  Negative for apnea, cough, choking, shortness of breath, wheezing and stridor  Cardiovascular: Negative  Gastrointestinal: Negative  Endocrine: Negative  Genitourinary: Negative  Musculoskeletal: Negative  Skin: Negative  Allergic/Immunologic: Negative  Neurological: Negative  Hematological: Negative  Psychiatric/Behavioral: Negative  Objective:      /72   Pulse 77   Temp 98 1 °F (36 7 °C)   Ht 5' 5 5" (1 664 m)   Wt 67 6 kg (149 lb)   SpO2 98%   BMI 24 42 kg/m²          Physical Exam   Constitutional: She is oriented to person, place, and time  She appears well-developed and well-nourished  No distress  Very pleasant, healthy-appearing 19-year-old female who is awake alert no acute distress and oriented x3   HENT:   Head: Normocephalic and atraumatic     Right Ear: External ear normal    Left Ear: External ear normal    Nose: Nose normal    Mouth/Throat: Oropharynx is clear and moist  No oropharyngeal exudate  Eyes: Pupils are equal, round, and reactive to light  Conjunctivae and EOM are normal  Right eye exhibits no discharge  Left eye exhibits no discharge  No scleral icterus  Neck: Normal range of motion  Neck supple  No JVD present  No tracheal deviation present  No thyromegaly present  Cardiovascular: Normal rate, regular rhythm, normal heart sounds and intact distal pulses  Exam reveals no gallop and no friction rub  No murmur heard  Pulmonary/Chest: Effort normal and breath sounds normal  No stridor  No respiratory distress  She has no wheezes  She has no rales  She exhibits no tenderness  Abdominal: Soft  Bowel sounds are normal  She exhibits no distension and no mass  There is no tenderness  There is no rebound and no guarding  No hernia  Musculoskeletal: Normal range of motion  She exhibits no edema or deformity  Lymphadenopathy:     She has no cervical adenopathy  Neurological: She is alert and oriented to person, place, and time  She displays normal reflexes  No cranial nerve deficit  She exhibits normal muscle tone  Coordination normal    Skin: Skin is warm and dry  Capillary refill takes less than 2 seconds  No rash noted  She is not diaphoretic  No erythema  Psychiatric: She has a normal mood and affect  Her behavior is normal  Judgment and thought content normal    Nursing note and vitals reviewed

## 2019-09-09 NOTE — ASSESSMENT & PLAN NOTE
Patient does have a history of hyperlipidemia  She remains on atorvastatin 10 mg a day  Apparently she reported to the medical assistant that she is taking the medication every other day  We will continue to monitor her cholesterol profile and make adjustments to medication if needed    Patient was told to watch her intake of fats and cholesterol with her diet

## 2019-09-09 NOTE — ASSESSMENT & PLAN NOTE
Patient is reporting episodes of left-sided chest pressure sometimes radiating to her left arm  She she denies any increasing shortness of breath  She states the pressure does not happen necessarily with exertion  She does have a history of hyperlipidemia, hypertension, family history of coronary artery disease  We did perform an EKG in the office today showing no abnormalities whatsoever but we did state for completion the patient should have a routine treadmill cardiac stress test and this will be arranged  Patient is recently retired and will be increasing her exercise program with and she is commended for this  We want to make sure that there is no evidence of heart disease prior to being more aggressive worse her exercise regime  Blood pressure is controlled today cholesterol has been under excellent control with present treatment    Patient will return to the office after stress test is obtained

## 2019-09-24 ENCOUNTER — HOSPITAL ENCOUNTER (OUTPATIENT)
Dept: NON INVASIVE DIAGNOSTICS | Facility: CLINIC | Age: 64
Discharge: HOME | End: 2019-09-24
Payer: COMMERCIAL

## 2019-09-24 DIAGNOSIS — E78.01 FAMILIAL HYPERCHOLESTEROLEMIA: ICD-10-CM

## 2019-09-24 DIAGNOSIS — R07.89 LEFT CHEST PRESSURE: ICD-10-CM

## 2019-09-24 DIAGNOSIS — I10 ESSENTIAL HYPERTENSION: ICD-10-CM

## 2019-09-24 LAB
CHEST PAIN STATEMENT: NORMAL
MAX DIASTOLIC BP: 70 MMHG
MAX HEART RATE: 151 BPM
MAX PREDICTED HEART RATE: 159 BPM
MAX. SYSTOLIC BP: 184 MMHG
PROTOCOL NAME: NORMAL
TARGET HR FORMULA: NORMAL
TEST INDICATION: NORMAL
TIME IN EXERCISE PHASE: NORMAL

## 2019-09-24 PROCEDURE — 93016 CV STRESS TEST SUPVJ ONLY: CPT | Performed by: INTERNAL MEDICINE

## 2019-09-24 PROCEDURE — 93018 CV STRESS TEST I&R ONLY: CPT | Performed by: INTERNAL MEDICINE

## 2019-09-24 PROCEDURE — 93017 CV STRESS TEST TRACING ONLY: CPT

## 2019-09-27 ENCOUNTER — OFFICE VISIT (OUTPATIENT)
Dept: INTERNAL MEDICINE CLINIC | Facility: CLINIC | Age: 64
End: 2019-09-27
Payer: COMMERCIAL

## 2019-09-27 VITALS
OXYGEN SATURATION: 98 % | BODY MASS INDEX: 23.72 KG/M2 | SYSTOLIC BLOOD PRESSURE: 118 MMHG | DIASTOLIC BLOOD PRESSURE: 78 MMHG | TEMPERATURE: 97.9 F | HEIGHT: 66 IN | HEART RATE: 69 BPM | WEIGHT: 147.6 LBS

## 2019-09-27 DIAGNOSIS — R07.89 LEFT CHEST PRESSURE: Primary | ICD-10-CM

## 2019-09-27 PROCEDURE — 99213 OFFICE O/P EST LOW 20 MIN: CPT | Performed by: INTERNAL MEDICINE

## 2019-09-27 PROCEDURE — 3008F BODY MASS INDEX DOCD: CPT | Performed by: INTERNAL MEDICINE

## 2019-09-27 NOTE — PROGRESS NOTES
Assessment/Plan:    Left chest pressure  Patient is here today for evaluation  Because of some episodes of left chest pain and pressure, history of hypertension, hyperlipidemia and strong family history of coronary artery disease patient was sent for routine treadmill cardiac stress test   We did review the results of her stress test which was considered normal with no evidence of ischemia  Patient did have an atrial arrhythmia post stress test but the cardiologist did not feel that this is a problem  Patient states that she has no further problems with chest pain or pressure but just occasionally when she is under stress has a fluttering to her heart  She states this happens rarely and we did discuss with her undergoing Holter monitor testing for evaluation  She states that if that becomes more pronounced in the future she would consider this but otherwise she feels that when this happens she has noted it is when she is under a lot of stress or very tired  Patient otherwise will be followed up with her normal poor min and approximately 6 months  She will continue present medication and surveillance  Her blood pressure and her cholesterol has been well controlled       Diagnoses and all orders for this visit:    Left chest pressure          Subjective:      Patient ID: Marina De is a 66 y o  female  Patient is a 62-year-old female with a history of hypertension, hyperlipidemia, family history of coronary artery disease who is here today to discuss the results of recent cardiac stress testing  We did review the results with the patient today length in the office  Patient has no new medical complaints or concerns      The following portions of the patient's history were reviewed and updated as appropriate: She  has a past medical history of Hyperlipemia and Hypertension    She   Patient Active Problem List    Diagnosis Date Noted    Left chest pressure 09/09/2019    Acute recurrent frontal sinusitis 2019    Localized, primary osteoarthritis of hand 2018    Hyperplastic polyp of ascending colon 2018    Healthcare maintenance 2018    Gastritis, acute 2017    Hyperlipidemia 2013    Hypertension 2012     She  has a past surgical history that includes Hysteroscopy w/ endometrial ablation and  section  Her family history includes Colon cancer (age of onset: 48) in her cousin; Lung cancer (age of onset: 48) in her cousin; Ovarian cancer (age of onset: 61) in her paternal aunt; Pancreatic cancer (age of onset: 66) in her paternal aunt  She  reports that she has never smoked  She has never used smokeless tobacco  She reports that she does not use drugs  Her alcohol history is not on file  Current Outpatient Medications   Medication Sig Dispense Refill    atorvastatin (LIPITOR) 10 mg tablet TAKE 1 TABLET BY MOUTH DAILY (Patient taking differently: 10 mg every other day ) 90 tablet 2    lisinopril (ZESTRIL) 10 mg tablet TAKE 1 TABLET DAILY 90 tablet 3    ondansetron (ZOFRAN) 4 mg tablet Take 1 tablet (4 mg total) by mouth every 8 (eight) hours as needed for nausea or vomiting (Patient not taking: Reported on 2019) 10 tablet 1     No current facility-administered medications for this visit  Current Outpatient Medications on File Prior to Visit   Medication Sig    atorvastatin (LIPITOR) 10 mg tablet TAKE 1 TABLET BY MOUTH DAILY (Patient taking differently: 10 mg every other day )    lisinopril (ZESTRIL) 10 mg tablet TAKE 1 TABLET DAILY    ondansetron (ZOFRAN) 4 mg tablet Take 1 tablet (4 mg total) by mouth every 8 (eight) hours as needed for nausea or vomiting (Patient not taking: Reported on 2019)     No current facility-administered medications on file prior to visit  She is allergic to grass extracts [gramineae pollens]; mold extract [trichophyton]; and other       Review of Systems   Constitutional: Negative  HENT: Negative      Eyes: Negative  Respiratory: Negative  Cardiovascular: Negative  Gastrointestinal: Negative  Endocrine: Negative  Musculoskeletal: Negative  Skin: Negative  Allergic/Immunologic: Negative  Neurological: Negative  Hematological: Negative  Psychiatric/Behavioral: Negative  Objective:      /78   Pulse 69   Temp 97 9 °F (36 6 °C)   Ht 5' 5 5" (1 664 m)   Wt 67 kg (147 lb 9 6 oz)   SpO2 98%   BMI 24 19 kg/m²          Physical Exam   Constitutional: She is oriented to person, place, and time  She appears well-developed and well-nourished  No distress  Cheerful, healthy-appearing 60-year-old female who is awake alert no acute distress and oriented x3   HENT:   Head: Normocephalic and atraumatic  Right Ear: External ear normal    Left Ear: External ear normal    Nose: Nose normal    Mouth/Throat: Oropharynx is clear and moist  No oropharyngeal exudate  Eyes: Pupils are equal, round, and reactive to light  Conjunctivae and EOM are normal  Right eye exhibits no discharge  Left eye exhibits no discharge  No scleral icterus  Neck: Normal range of motion  Neck supple  No JVD present  No tracheal deviation present  No thyromegaly present  Cardiovascular: Normal rate, regular rhythm, normal heart sounds and intact distal pulses  Exam reveals no gallop and no friction rub  No murmur heard  Patient's heart rhythm is stable with no ectopy heard on auscultation   Pulmonary/Chest: Effort normal and breath sounds normal  No stridor  No respiratory distress  She has no wheezes  She has no rales  She exhibits no tenderness  Abdominal: Soft  Bowel sounds are normal  She exhibits no distension and no mass  There is no tenderness  There is no rebound and no guarding  No hernia  Musculoskeletal: Normal range of motion  She exhibits no edema, tenderness or deformity  Lymphadenopathy:     She has no cervical adenopathy     Neurological: She is alert and oriented to person, place, and time  She displays normal reflexes  No cranial nerve deficit or sensory deficit  She exhibits normal muscle tone  Coordination normal    Skin: Skin is warm and dry  Capillary refill takes less than 2 seconds  She is not diaphoretic  Psychiatric: She has a normal mood and affect  Her behavior is normal  Judgment and thought content normal    Nursing note and vitals reviewed

## 2019-09-27 NOTE — ASSESSMENT & PLAN NOTE
Patient is here today for evaluation  Because of some episodes of left chest pain and pressure, history of hypertension, hyperlipidemia and strong family history of coronary artery disease patient was sent for routine treadmill cardiac stress test   We did review the results of her stress test which was considered normal with no evidence of ischemia  Patient did have an atrial arrhythmia post stress test but the cardiologist did not feel that this is a problem  Patient states that she has no further problems with chest pain or pressure but just occasionally when she is under stress has a fluttering to her heart  She states this happens rarely and we did discuss with her undergoing Holter monitor testing for evaluation  She states that if that becomes more pronounced in the future she would consider this but otherwise she feels that when this happens she has noted it is when she is under a lot of stress or very tired  Patient otherwise will be followed up with her normal poor min and approximately 6 months  She will continue present medication and surveillance    Her blood pressure and her cholesterol has been well controlled

## 2019-10-15 DIAGNOSIS — E78.01 FAMILIAL HYPERCHOLESTEROLEMIA: ICD-10-CM

## 2019-10-15 RX ORDER — ATORVASTATIN CALCIUM 10 MG/1
10 TABLET, FILM COATED ORAL EVERY OTHER DAY
Qty: 90 TABLET | Refills: 3 | Status: SHIPPED | OUTPATIENT
Start: 2019-10-15 | End: 2020-06-16 | Stop reason: SDUPTHER

## 2019-10-21 ENCOUNTER — IMMUNIZATIONS (OUTPATIENT)
Dept: INTERNAL MEDICINE CLINIC | Facility: CLINIC | Age: 64
End: 2019-10-21
Payer: COMMERCIAL

## 2019-10-21 DIAGNOSIS — Z23 NEED FOR INFLUENZA VACCINATION: Primary | ICD-10-CM

## 2019-10-21 PROCEDURE — 90682 RIV4 VACC RECOMBINANT DNA IM: CPT

## 2019-10-21 PROCEDURE — 90471 IMMUNIZATION ADMIN: CPT

## 2019-11-22 DIAGNOSIS — I10 ESSENTIAL HYPERTENSION: ICD-10-CM

## 2019-11-22 RX ORDER — LISINOPRIL 10 MG/1
TABLET ORAL
Qty: 90 TABLET | Refills: 3 | Status: SHIPPED | OUTPATIENT
Start: 2019-11-22 | End: 2020-09-29

## 2020-01-02 ENCOUNTER — OFFICE VISIT (OUTPATIENT)
Dept: INTERNAL MEDICINE CLINIC | Facility: CLINIC | Age: 65
End: 2020-01-02
Payer: COMMERCIAL

## 2020-01-02 VITALS
BODY MASS INDEX: 23.95 KG/M2 | DIASTOLIC BLOOD PRESSURE: 82 MMHG | WEIGHT: 149 LBS | TEMPERATURE: 98.3 F | OXYGEN SATURATION: 96 % | HEART RATE: 74 BPM | HEIGHT: 66 IN | SYSTOLIC BLOOD PRESSURE: 126 MMHG

## 2020-01-02 DIAGNOSIS — R05.9 COUGH: Primary | ICD-10-CM

## 2020-01-02 PROCEDURE — 1036F TOBACCO NON-USER: CPT | Performed by: INTERNAL MEDICINE

## 2020-01-02 PROCEDURE — 3008F BODY MASS INDEX DOCD: CPT | Performed by: INTERNAL MEDICINE

## 2020-01-02 PROCEDURE — 99213 OFFICE O/P EST LOW 20 MIN: CPT | Performed by: INTERNAL MEDICINE

## 2020-01-02 NOTE — ASSESSMENT & PLAN NOTE
Patient is here for evaluation of a cough  She states that she did have an upper respiratory tract infection from her grandchild  She states that she now has a persistent cough which most times is nonproductive and is very dry  No shortness of breath, fever chills   No shortness of breath  On evaluation patient does have some mild erythema to posterior airway in a slight whitish postnasal drip, lungs are clear  Patient was told to restart her Flonase nasal spray to start working at increasing hydration continue with over-the-counter cough medicine such as Mucinex DM and to restart using her humidifier in the bedroom  To call if not improving

## 2020-06-16 ENCOUNTER — OFFICE VISIT (OUTPATIENT)
Dept: INTERNAL MEDICINE CLINIC | Facility: CLINIC | Age: 65
End: 2020-06-16
Payer: COMMERCIAL

## 2020-06-16 VITALS
HEART RATE: 81 BPM | TEMPERATURE: 99.2 F | DIASTOLIC BLOOD PRESSURE: 80 MMHG | HEIGHT: 66 IN | WEIGHT: 150 LBS | OXYGEN SATURATION: 98 % | SYSTOLIC BLOOD PRESSURE: 128 MMHG | BODY MASS INDEX: 24.11 KG/M2

## 2020-06-16 DIAGNOSIS — M77.11 LATERAL EPICONDYLITIS OF BOTH ELBOWS: ICD-10-CM

## 2020-06-16 DIAGNOSIS — I10 ESSENTIAL HYPERTENSION: ICD-10-CM

## 2020-06-16 DIAGNOSIS — M79.644 BILATERAL THUMB PAIN: ICD-10-CM

## 2020-06-16 DIAGNOSIS — M77.12 LATERAL EPICONDYLITIS OF BOTH ELBOWS: ICD-10-CM

## 2020-06-16 DIAGNOSIS — Z00.00 HEALTHCARE MAINTENANCE: ICD-10-CM

## 2020-06-16 DIAGNOSIS — M19.049 LOCALIZED, PRIMARY OSTEOARTHRITIS OF HAND, UNSPECIFIED LATERALITY: ICD-10-CM

## 2020-06-16 DIAGNOSIS — M79.645 BILATERAL THUMB PAIN: ICD-10-CM

## 2020-06-16 DIAGNOSIS — K63.5 HYPERPLASTIC POLYP OF ASCENDING COLON: Primary | ICD-10-CM

## 2020-06-16 DIAGNOSIS — E78.01 FAMILIAL HYPERCHOLESTEROLEMIA: ICD-10-CM

## 2020-06-16 PROCEDURE — 99214 OFFICE O/P EST MOD 30 MIN: CPT | Performed by: INTERNAL MEDICINE

## 2020-06-16 PROCEDURE — 3074F SYST BP LT 130 MM HG: CPT | Performed by: INTERNAL MEDICINE

## 2020-06-16 PROCEDURE — 3079F DIAST BP 80-89 MM HG: CPT | Performed by: INTERNAL MEDICINE

## 2020-06-16 PROCEDURE — 1036F TOBACCO NON-USER: CPT | Performed by: INTERNAL MEDICINE

## 2020-06-16 PROCEDURE — 3008F BODY MASS INDEX DOCD: CPT | Performed by: INTERNAL MEDICINE

## 2020-06-16 RX ORDER — ATORVASTATIN CALCIUM 10 MG/1
10 TABLET, FILM COATED ORAL EVERY OTHER DAY
Qty: 90 TABLET | Refills: 3 | Status: SHIPPED | OUTPATIENT
Start: 2020-06-16 | End: 2021-05-04 | Stop reason: SDUPTHER

## 2020-07-14 DIAGNOSIS — Z11.59 SPECIAL SCREENING EXAMINATION FOR UNSPECIFIED VIRAL DISEASE: ICD-10-CM

## 2020-07-14 PROCEDURE — U0003 INFECTIOUS AGENT DETECTION BY NUCLEIC ACID (DNA OR RNA); SEVERE ACUTE RESPIRATORY SYNDROME CORONAVIRUS 2 (SARS-COV-2) (CORONAVIRUS DISEASE [COVID-19]), AMPLIFIED PROBE TECHNIQUE, MAKING USE OF HIGH THROUGHPUT TECHNOLOGIES AS DESCRIBED BY CMS-2020-01-R: HCPCS | Performed by: OBSTETRICS & GYNECOLOGY

## 2020-07-15 LAB
INPATIENT: NORMAL
SARS-COV-2 RNA SPEC QL NAA+PROBE: NOT DETECTED

## 2020-08-11 ENCOUNTER — APPOINTMENT (OUTPATIENT)
Dept: LAB | Facility: CLINIC | Age: 65
End: 2020-08-11
Payer: COMMERCIAL

## 2020-08-11 ENCOUNTER — TRANSCRIBE ORDERS (OUTPATIENT)
Dept: LAB | Facility: CLINIC | Age: 65
End: 2020-08-11

## 2020-08-11 DIAGNOSIS — I10 ESSENTIAL HYPERTENSION: ICD-10-CM

## 2020-08-11 DIAGNOSIS — E78.01 FAMILIAL HYPERCHOLESTEROLEMIA: ICD-10-CM

## 2020-08-11 DIAGNOSIS — Z00.00 HEALTHCARE MAINTENANCE: ICD-10-CM

## 2020-08-11 LAB
ALBUMIN SERPL BCP-MCNC: 3.5 G/DL (ref 3.5–5)
ALP SERPL-CCNC: 53 U/L (ref 46–116)
ALT SERPL W P-5'-P-CCNC: 41 U/L (ref 12–78)
ANION GAP SERPL CALCULATED.3IONS-SCNC: 5 MMOL/L (ref 4–13)
AST SERPL W P-5'-P-CCNC: 18 U/L (ref 5–45)
BACTERIA UR QL AUTO: ABNORMAL /HPF
BASOPHILS # BLD AUTO: 0.05 THOUSANDS/ΜL (ref 0–0.1)
BASOPHILS NFR BLD AUTO: 1 % (ref 0–1)
BILIRUB SERPL-MCNC: 0.51 MG/DL (ref 0.2–1)
BILIRUB UR QL STRIP: NEGATIVE
BUN SERPL-MCNC: 19 MG/DL (ref 5–25)
CALCIUM SERPL-MCNC: 8.6 MG/DL (ref 8.3–10.1)
CHLORIDE SERPL-SCNC: 104 MMOL/L (ref 100–108)
CHOLEST SERPL-MCNC: 219 MG/DL (ref 50–200)
CLARITY UR: CLEAR
CO2 SERPL-SCNC: 29 MMOL/L (ref 21–32)
COLOR UR: YELLOW
CREAT SERPL-MCNC: 0.92 MG/DL (ref 0.6–1.3)
EOSINOPHIL # BLD AUTO: 0.23 THOUSAND/ΜL (ref 0–0.61)
EOSINOPHIL NFR BLD AUTO: 4 % (ref 0–6)
ERYTHROCYTE [DISTWIDTH] IN BLOOD BY AUTOMATED COUNT: 11.6 % (ref 11.6–15.1)
GFR SERPL CREATININE-BSD FRML MDRD: 67 ML/MIN/1.73SQ M
GLUCOSE P FAST SERPL-MCNC: 98 MG/DL (ref 65–99)
GLUCOSE UR STRIP-MCNC: NEGATIVE MG/DL
HCT VFR BLD AUTO: 41.7 % (ref 34.8–46.1)
HDLC SERPL-MCNC: 41 MG/DL
HGB BLD-MCNC: 13.6 G/DL (ref 11.5–15.4)
HGB UR QL STRIP.AUTO: NEGATIVE
IMM GRANULOCYTES # BLD AUTO: 0.01 THOUSAND/UL (ref 0–0.2)
IMM GRANULOCYTES NFR BLD AUTO: 0 % (ref 0–2)
KETONES UR STRIP-MCNC: NEGATIVE MG/DL
LDLC SERPL CALC-MCNC: 131 MG/DL (ref 0–100)
LEUKOCYTE ESTERASE UR QL STRIP: ABNORMAL
LYMPHOCYTES # BLD AUTO: 2.41 THOUSANDS/ΜL (ref 0.6–4.47)
LYMPHOCYTES NFR BLD AUTO: 41 % (ref 14–44)
MCH RBC QN AUTO: 30.6 PG (ref 26.8–34.3)
MCHC RBC AUTO-ENTMCNC: 32.6 G/DL (ref 31.4–37.4)
MCV RBC AUTO: 94 FL (ref 82–98)
MONOCYTES # BLD AUTO: 0.52 THOUSAND/ΜL (ref 0.17–1.22)
MONOCYTES NFR BLD AUTO: 9 % (ref 4–12)
NEUTROPHILS # BLD AUTO: 2.61 THOUSANDS/ΜL (ref 1.85–7.62)
NEUTS SEG NFR BLD AUTO: 45 % (ref 43–75)
NITRITE UR QL STRIP: NEGATIVE
NON-SQ EPI CELLS URNS QL MICRO: ABNORMAL /HPF
NONHDLC SERPL-MCNC: 178 MG/DL
NRBC BLD AUTO-RTO: 0 /100 WBCS
PH UR STRIP.AUTO: 5.5 [PH]
PLATELET # BLD AUTO: 275 THOUSANDS/UL (ref 149–390)
PMV BLD AUTO: 9.4 FL (ref 8.9–12.7)
POTASSIUM SERPL-SCNC: 4.2 MMOL/L (ref 3.5–5.3)
PROT SERPL-MCNC: 6.9 G/DL (ref 6.4–8.2)
PROT UR STRIP-MCNC: NEGATIVE MG/DL
RBC # BLD AUTO: 4.45 MILLION/UL (ref 3.81–5.12)
RBC #/AREA URNS AUTO: ABNORMAL /HPF
SODIUM SERPL-SCNC: 138 MMOL/L (ref 136–145)
SP GR UR STRIP.AUTO: 1.02 (ref 1–1.03)
TRIGL SERPL-MCNC: 237 MG/DL
TSH SERPL DL<=0.05 MIU/L-ACNC: 1.22 UIU/ML (ref 0.36–3.74)
UROBILINOGEN UR QL STRIP.AUTO: 0.2 E.U./DL
WBC # BLD AUTO: 5.83 THOUSAND/UL (ref 4.31–10.16)
WBC #/AREA URNS AUTO: ABNORMAL /HPF

## 2020-08-11 PROCEDURE — 80061 LIPID PANEL: CPT

## 2020-08-11 PROCEDURE — 36415 COLL VENOUS BLD VENIPUNCTURE: CPT

## 2020-08-11 PROCEDURE — 81001 URINALYSIS AUTO W/SCOPE: CPT

## 2020-08-11 PROCEDURE — 84443 ASSAY THYROID STIM HORMONE: CPT

## 2020-08-11 PROCEDURE — 80053 COMPREHEN METABOLIC PANEL: CPT

## 2020-08-11 PROCEDURE — 85025 COMPLETE CBC W/AUTO DIFF WBC: CPT

## 2020-09-29 ENCOUNTER — OFFICE VISIT (OUTPATIENT)
Dept: INTERNAL MEDICINE CLINIC | Facility: CLINIC | Age: 65
End: 2020-09-29
Payer: COMMERCIAL

## 2020-09-29 VITALS
HEIGHT: 66 IN | BODY MASS INDEX: 24.27 KG/M2 | HEART RATE: 81 BPM | OXYGEN SATURATION: 98 % | SYSTOLIC BLOOD PRESSURE: 126 MMHG | TEMPERATURE: 98.3 F | WEIGHT: 151 LBS | DIASTOLIC BLOOD PRESSURE: 74 MMHG

## 2020-09-29 DIAGNOSIS — R05.9 COUGH: Primary | ICD-10-CM

## 2020-09-29 DIAGNOSIS — I10 ESSENTIAL HYPERTENSION: ICD-10-CM

## 2020-09-29 DIAGNOSIS — E78.01 FAMILIAL HYPERCHOLESTEROLEMIA: ICD-10-CM

## 2020-09-29 DIAGNOSIS — Z23 ENCOUNTER FOR IMMUNIZATION: ICD-10-CM

## 2020-09-29 DIAGNOSIS — Z00.00 HEALTHCARE MAINTENANCE: ICD-10-CM

## 2020-09-29 PROCEDURE — 1036F TOBACCO NON-USER: CPT | Performed by: INTERNAL MEDICINE

## 2020-09-29 PROCEDURE — 3078F DIAST BP <80 MM HG: CPT | Performed by: INTERNAL MEDICINE

## 2020-09-29 PROCEDURE — 3074F SYST BP LT 130 MM HG: CPT | Performed by: INTERNAL MEDICINE

## 2020-09-29 PROCEDURE — 90682 RIV4 VACC RECOMBINANT DNA IM: CPT | Performed by: INTERNAL MEDICINE

## 2020-09-29 PROCEDURE — 99215 OFFICE O/P EST HI 40 MIN: CPT | Performed by: INTERNAL MEDICINE

## 2020-09-29 PROCEDURE — 90471 IMMUNIZATION ADMIN: CPT | Performed by: INTERNAL MEDICINE

## 2020-09-29 RX ORDER — VALSARTAN 160 MG/1
160 TABLET ORAL DAILY
Qty: 30 TABLET | Refills: 3 | Status: SHIPPED | OUTPATIENT
Start: 2020-09-29 | End: 2020-11-25

## 2020-09-29 NOTE — ASSESSMENT & PLAN NOTE
Patient is here today for routine yearly physical   Did have extensive lab testing performed prior to the visit today we did discuss the results  Patient states she is doing well  Her only complaint is her chronic cough  We have discuss this with the patient in the past and did give her medications thinking it may be secondary to reflux which did not help  The other question is whether not this is secondary to medications specifically her lisinopril  After long discussions with the patient today the decision to make some changes but no other changes are made  She is up-to-date with all routine screening and needs to make an appointment to see her gynecologist for re-evaluation

## 2020-09-29 NOTE — ASSESSMENT & PLAN NOTE
Again with concerns about her chronic cough whether this was secondary to her reflux symptoms or her being on an ACE-inhibitor  She had tried being placed on a PPI with no change    Will stop her lisinopril place her on valsartan 160 mg daily, return to the office in 4 weeks for re-evaluation

## 2020-09-29 NOTE — ASSESSMENT & PLAN NOTE
Patient did have a repeat lipid profile performed her cholesterol is going up  Patient states that she is trying to watch her diet is not always perfect  We will recheck this again in approximately 6 months and make decisions as to whether increased dose of Lipitor is needed    We did reiterate to the patient the importance of watching her intake of fats and cholesterol with her diet

## 2020-09-29 NOTE — PROGRESS NOTES
Assessment/Plan:    Healthcare maintenance  Patient is here today for routine yearly physical   Did have extensive lab testing performed prior to the visit today we did discuss the results  Patient states she is doing well  Her only complaint is her chronic cough  We have discuss this with the patient in the past and did give her medications thinking it may be secondary to reflux which did not help  The other question is whether not this is secondary to medications specifically her lisinopril  After long discussions with the patient today the decision to make some changes but no other changes are made  She is up-to-date with all routine screening and needs to make an appointment to see her gynecologist for re-evaluation  Hypertension  Patient's blood pressure showing acceptable control the with present medication but again we have concerns about her chronic cough and whether not this is related to her being on lisinopril  The decision today was to stop the lisinopril start her on Diovan 160 mg daily  Recheck her blood pressure in the office in approximately 4 weeks  Cough  Again with concerns about her chronic cough whether this was secondary to her reflux symptoms or her being on an ACE-inhibitor  She had tried being placed on a PPI with no change  Will stop her lisinopril place her on valsartan 160 mg daily, return to the office in 4 weeks for re-evaluation    Hyperlipidemia  Patient did have a repeat lipid profile performed her cholesterol is going up  Patient states that she is trying to watch her diet is not always perfect  We will recheck this again in approximately 6 months and make decisions as to whether increased dose of Lipitor is needed  We did reiterate to the patient the importance of watching her intake of fats and cholesterol with her diet       Diagnoses and all orders for this visit:    Cough  -     valsartan (DIOVAN) 160 mg tablet;  Take 1 tablet (160 mg total) by mouth daily    Encounter for immunization  -     influenza vaccine, quadrivalent, recombinant, PF, 0 5 mL, for patients 18 yr+ (FLUBLOK)    Essential hypertension  -     valsartan (DIOVAN) 160 mg tablet; Take 1 tablet (160 mg total) by mouth daily    Healthcare maintenance    Familial hypercholesterolemia          Subjective:      Patient ID: Julian Mathis is a 60 y o  female  28-year-old female history of multiple medical problems including hypertension, hyperlipidemia  Patient is here today for yearly physical   She did have labs performed prior to the visit today and we did discuss the results  Patient's only concern is her chronic cough which has not responded to being placed on a PPI for her reflux symptoms  The following portions of the patient's history were reviewed and updated as appropriate:   She  has a past medical history of Hyperlipemia and Hypertension  She   Patient Active Problem List    Diagnosis Date Noted    Bilateral thumb pain 2020    Lateral epicondylitis of both elbows 2020    Cough 2020    Left chest pressure 2019    Acute recurrent frontal sinusitis 2019    Localized, primary osteoarthritis of hand 2018    Hyperplastic polyp of ascending colon 2018    Healthcare maintenance 2018    Gastritis, acute 2017    Hyperlipidemia 2013    Hypertension 2012     She  has a past surgical history that includes Hysteroscopy w/ endometrial ablation and  section  Her family history includes Colon cancer (age of onset: 48) in her cousin; Lung cancer (age of onset: 48) in her cousin; Ovarian cancer (age of onset: 61) in her paternal aunt; Pancreatic cancer (age of onset: 66) in her paternal aunt  She  reports that she has never smoked  She has never used smokeless tobacco  She reports that she does not use drugs  No history on file for alcohol    Current Outpatient Medications   Medication Sig Dispense Refill    atorvastatin (LIPITOR) 10 mg tablet Take 1 tablet (10 mg total) by mouth every other day 90 tablet 3    ondansetron (ZOFRAN) 4 mg tablet Take 1 tablet (4 mg total) by mouth every 8 (eight) hours as needed for nausea or vomiting (Patient not taking: Reported on 9/29/2020) 10 tablet 1    valsartan (DIOVAN) 160 mg tablet Take 1 tablet (160 mg total) by mouth daily 30 tablet 3     No current facility-administered medications for this visit  Current Outpatient Medications on File Prior to Visit   Medication Sig    atorvastatin (LIPITOR) 10 mg tablet Take 1 tablet (10 mg total) by mouth every other day    [DISCONTINUED] lisinopril (ZESTRIL) 10 mg tablet TAKE 1 TABLET DAILY    ondansetron (ZOFRAN) 4 mg tablet Take 1 tablet (4 mg total) by mouth every 8 (eight) hours as needed for nausea or vomiting (Patient not taking: Reported on 9/29/2020)     No current facility-administered medications on file prior to visit  She is allergic to grass extracts [gramineae pollens]; mold extract [trichophyton]; and other       Review of Systems   Constitutional: Negative  HENT: Negative  Eyes: Negative  Respiratory: Positive for cough  Negative for apnea, choking, chest tightness, shortness of breath, wheezing and stridor  Cardiovascular: Negative  Gastrointestinal: Negative  Endocrine: Negative  Genitourinary: Negative  Musculoskeletal: Negative  Skin: Negative  Allergic/Immunologic: Negative  Hematological: Negative  Psychiatric/Behavioral: Negative  Objective:      /74   Pulse 81   Temp 98 3 °F (36 8 °C)   Ht 5' 5 5" (1 664 m)   Wt 68 5 kg (151 lb)   SpO2 98%   BMI 24 75 kg/m²          Physical Exam  Vitals signs and nursing note reviewed  Constitutional:       General: She is not in acute distress  Appearance: Normal appearance  She is normal weight  She is not ill-appearing, toxic-appearing or diaphoretic        Comments: Pleasant, healthy-appearing articulate 61-year-old female who is awake alert no acute distress and oriented x3   HENT:      Head: Normocephalic and atraumatic  Right Ear: Tympanic membrane, ear canal and external ear normal  There is no impacted cerumen  Left Ear: Tympanic membrane, ear canal and external ear normal  There is no impacted cerumen  Nose: Nose normal  No congestion or rhinorrhea  Mouth/Throat:      Mouth: Mucous membranes are moist       Pharynx: Oropharynx is clear  No oropharyngeal exudate or posterior oropharyngeal erythema  Eyes:      General: No scleral icterus  Right eye: No discharge  Left eye: No discharge  Extraocular Movements: Extraocular movements intact  Conjunctiva/sclera: Conjunctivae normal       Pupils: Pupils are equal, round, and reactive to light  Neck:      Musculoskeletal: Normal range of motion and neck supple  No neck rigidity or muscular tenderness  Vascular: No carotid bruit  Cardiovascular:      Rate and Rhythm: Normal rate and regular rhythm  Pulses: Normal pulses  Heart sounds: Normal heart sounds  No murmur  No friction rub  No gallop  Pulmonary:      Effort: Pulmonary effort is normal  No respiratory distress  Breath sounds: Normal breath sounds  No stridor  No wheezing, rhonchi or rales  Chest:      Chest wall: No tenderness  Abdominal:      General: Abdomen is flat  Bowel sounds are normal  There is no distension  Palpations: Abdomen is soft  There is no mass  Tenderness: There is no abdominal tenderness  There is no right CVA tenderness, left CVA tenderness, guarding or rebound  Hernia: No hernia is present  Genitourinary:     Rectum: Guaiac result negative  Musculoskeletal: Normal range of motion  General: No swelling, tenderness, deformity or signs of injury  Right lower leg: No edema  Left lower leg: No edema  Lymphadenopathy:      Cervical: No cervical adenopathy     Skin: General: Skin is warm and dry  Capillary Refill: Capillary refill takes less than 2 seconds  Coloration: Skin is not jaundiced or pale  Findings: No bruising, erythema or lesion  Neurological:      General: No focal deficit present  Mental Status: She is alert and oriented to person, place, and time  Mental status is at baseline  Cranial Nerves: No cranial nerve deficit  Sensory: No sensory deficit  Motor: No weakness  Coordination: Coordination normal       Gait: Gait normal       Deep Tendon Reflexes: Reflexes normal    Psychiatric:         Mood and Affect: Mood normal          Thought Content:  Thought content normal          Judgment: Judgment normal

## 2020-09-29 NOTE — ASSESSMENT & PLAN NOTE
Patient's blood pressure showing acceptable control the with present medication but again we have concerns about her chronic cough and whether not this is related to her being on lisinopril  The decision today was to stop the lisinopril start her on Diovan 160 mg daily  Recheck her blood pressure in the office in approximately 4 weeks

## 2020-11-03 ENCOUNTER — TRANSCRIBE ORDERS (OUTPATIENT)
Dept: ADMINISTRATIVE | Facility: HOSPITAL | Age: 65
End: 2020-11-03

## 2020-11-03 ENCOUNTER — OFFICE VISIT (OUTPATIENT)
Dept: INTERNAL MEDICINE CLINIC | Facility: CLINIC | Age: 65
End: 2020-11-03
Payer: COMMERCIAL

## 2020-11-03 VITALS
BODY MASS INDEX: 24.27 KG/M2 | SYSTOLIC BLOOD PRESSURE: 132 MMHG | DIASTOLIC BLOOD PRESSURE: 84 MMHG | HEART RATE: 78 BPM | HEIGHT: 66 IN | WEIGHT: 151 LBS | OXYGEN SATURATION: 98 %

## 2020-11-03 DIAGNOSIS — Z12.31 ENCOUNTER FOR SCREENING MAMMOGRAM FOR MALIGNANT NEOPLASM OF BREAST: Primary | ICD-10-CM

## 2020-11-03 DIAGNOSIS — E78.01 FAMILIAL HYPERCHOLESTEROLEMIA: ICD-10-CM

## 2020-11-03 DIAGNOSIS — I10 ESSENTIAL HYPERTENSION: Primary | ICD-10-CM

## 2020-11-03 PROCEDURE — 3075F SYST BP GE 130 - 139MM HG: CPT | Performed by: INTERNAL MEDICINE

## 2020-11-03 PROCEDURE — 99213 OFFICE O/P EST LOW 20 MIN: CPT | Performed by: INTERNAL MEDICINE

## 2020-11-03 PROCEDURE — 3008F BODY MASS INDEX DOCD: CPT | Performed by: INTERNAL MEDICINE

## 2020-11-03 PROCEDURE — 3079F DIAST BP 80-89 MM HG: CPT | Performed by: INTERNAL MEDICINE

## 2020-11-03 PROCEDURE — 1036F TOBACCO NON-USER: CPT | Performed by: INTERNAL MEDICINE

## 2020-11-25 DIAGNOSIS — I10 ESSENTIAL HYPERTENSION: ICD-10-CM

## 2020-11-25 DIAGNOSIS — R05.9 COUGH: ICD-10-CM

## 2020-11-25 RX ORDER — VALSARTAN 160 MG/1
TABLET ORAL
Qty: 30 TABLET | Refills: 3 | Status: SHIPPED | OUTPATIENT
Start: 2020-11-25 | End: 2021-02-19

## 2021-01-12 ENCOUNTER — ANNUAL EXAM (OUTPATIENT)
Dept: OBGYN CLINIC | Facility: CLINIC | Age: 66
End: 2021-01-12

## 2021-01-12 VITALS
WEIGHT: 150 LBS | DIASTOLIC BLOOD PRESSURE: 70 MMHG | HEIGHT: 66 IN | SYSTOLIC BLOOD PRESSURE: 120 MMHG | BODY MASS INDEX: 24.11 KG/M2

## 2021-01-12 DIAGNOSIS — Z12.4 ENCOUNTER FOR SCREENING FOR CERVICAL CANCER: ICD-10-CM

## 2021-01-12 DIAGNOSIS — Z01.419 WELL FEMALE EXAM WITH ROUTINE GYNECOLOGICAL EXAM: Primary | ICD-10-CM

## 2021-01-12 PROCEDURE — 87624 HPV HI-RISK TYP POOLED RSLT: CPT | Performed by: OBSTETRICS & GYNECOLOGY

## 2021-01-12 PROCEDURE — PNV: Performed by: OBSTETRICS & GYNECOLOGY

## 2021-01-12 PROCEDURE — G0145 SCR C/V CYTO,THINLAYER,RESCR: HCPCS | Performed by: OBSTETRICS & GYNECOLOGY

## 2021-01-12 NOTE — PROGRESS NOTES
ASSESSMENT & PLAN:   Diagnoses and all orders for this visit:    Well female exam with routine gynecological exam  Encounter for screening for cervical cancer   -     Liquid-based pap, screening          The following were reviewed in today's visit: Current ASCCP Guidelines, breast self exam, mammography screening ordered, menopause, exercise and healthy diet  Patient to return to office yearly for annual exam      All questions have been answered to her satisfaction  CC:  Annual Gynecologic Examination    HPI: Migel Doyle is a 63 y o  [id number] who presents for annual gynecologic examination  She has the following concerns:  Small slit like lesion in vaginal area last year that was present for a few weeks  Health Maintenance:    She exercises 7 days per week  She wears her seatbelt routinely  She does perform regular monthly self breast exams  Patient tries to follow a balanced diet  Last mammogram: 3/2019, scheduled for Feb  Last colonoscopy: 2020       Past Medical History:   Diagnosis Date    Hyperlipemia     Hypertension        Past Surgical History:   Procedure Laterality Date     SECTION      HYSTEROSCOPY W/ ENDOMETRIAL ABLATION         Past OB/Gyn History:   No LMP recorded  Patient is postmenopausal       Patient is post menopausal    History of sexually transmitted infection: No  Patient is currently sexually active      Birth control: postmenopausal  Last Pap 2017    Family History   Problem Relation Age of Onset    Ovarian cancer Paternal Aunt 61    Pancreatic cancer Paternal Aunt 66    Colon cancer Cousin 48        mets to lung    Lung cancer Cousin 48        mets from colon cancer    Diabetes Mother     Heart disease Father     Diabetes Father        Social History:  Social History     Socioeconomic History    Marital status: /Civil Union     Spouse name: Not on file    Number of children: Not on file    Years of education: Not on file    Highest education level: Not on file   Occupational History    Not on file   Social Needs    Financial resource strain: Not on file    Food insecurity     Worry: Not on file     Inability: Not on file    Transportation needs     Medical: Not on file     Non-medical: Not on file   Tobacco Use    Smoking status: Never Smoker    Smokeless tobacco: Never Used   Substance and Sexual Activity    Alcohol use: Yes     Comment: social    Drug use: Never    Sexual activity: Yes     Partners: Male     Birth control/protection: None, Post-menopausal   Lifestyle    Physical activity     Days per week: Not on file     Minutes per session: Not on file    Stress: Not on file   Relationships    Social connections     Talks on phone: Not on file     Gets together: Not on file     Attends Buddhism service: Not on file     Active member of club or organization: Not on file     Attends meetings of clubs or organizations: Not on file     Relationship status: Not on file    Intimate partner violence     Fear of current or ex partner: Not on file     Emotionally abused: Not on file     Physically abused: Not on file     Forced sexual activity: Not on file   Other Topics Concern    Not on file   Social History Narrative    Not on file     Presently lives with   She feels safe at home  Patient is currently retired  Allergies   Allergen Reactions    Grass Extracts [Gramineae Pollens]     Mold Extract [Trichophyton]     Other      Trees  [place] trees         Current Outpatient Medications:     atorvastatin (LIPITOR) 10 mg tablet, Take 1 tablet (10 mg total) by mouth every other day (Patient taking differently: Take 10 mg by mouth daily ), Disp: 90 tablet, Rfl: 3    valsartan (DIOVAN) 160 mg tablet, TAKE 1 TABLET BY MOUTH EVERY DAY, Disp: 30 tablet, Rfl: 3    Review of Systems:  Review of Systems   Constitutional: Negative for chills, fever and unexpected weight change     Respiratory: Negative for cough and shortness of breath  Cardiovascular: Negative for chest pain and palpitations  Gastrointestinal: Negative for abdominal distention, abdominal pain, blood in stool, constipation, nausea and vomiting  Genitourinary: Negative for difficulty urinating, dyspareunia, dysuria, frequency, genital sores, pelvic pain, urgency, vaginal bleeding, vaginal discharge and vaginal pain  Neurological: Negative for headaches  Physical Exam:  /70 (BP Location: Right arm, Patient Position: Sitting, Cuff Size: Standard)   Ht 5' 5 5" (1 664 m)   Wt 68 kg (150 lb)   BMI 24 58 kg/m²    Physical Exam  Constitutional:       General: She is awake  Appearance: Normal appearance  She is well-developed  Genitourinary:      Pelvic exam was performed with patient in the lithotomy position  Vulva, urethra, bladder, vagina and uterus normal       No vulval lesion, ulcerations or rash noted  No urethral prolapse present  Bladder is not distended or tender  Vaginal atrophy present  No vaginal discharge, erythema, tenderness or bleeding  Cervix is parous  No cervical motion tenderness or polyp  Uterus is not enlarged, tender, irregular or mobile  No uterine mass detected  Uterus is regular  Uterus exam comments: Prolapse    No right or left adnexal mass present  Right adnexa not tender or full  Left adnexa not tender or full  HENT:      Head: Normocephalic and atraumatic  Neck:      Musculoskeletal: Neck supple  Cardiovascular:      Rate and Rhythm: Normal rate and regular rhythm  Heart sounds: Normal heart sounds  Pulmonary:      Effort: Pulmonary effort is normal  No tachypnea or respiratory distress  Breath sounds: Normal breath sounds  Chest:      Breasts:         Right: Normal  No inverted nipple, mass, nipple discharge, skin change or tenderness  Left: Normal  No inverted nipple, mass, nipple discharge, skin change or tenderness  Abdominal:      General: There is no distension  Palpations: Abdomen is soft  Tenderness: There is no abdominal tenderness  There is no guarding  Lymphadenopathy:      Upper Body:      Right upper body: No supraclavicular or axillary adenopathy  Left upper body: No supraclavicular or axillary adenopathy  Neurological:      General: No focal deficit present  Mental Status: She is alert and oriented to person, place, and time  Psychiatric:         Mood and Affect: Mood normal          Behavior: Behavior normal          Thought Content: Thought content normal          Judgment: Judgment normal    Vitals signs reviewed

## 2021-01-16 LAB
HPV HR 12 DNA CVX QL NAA+PROBE: NEGATIVE
HPV16 DNA CVX QL NAA+PROBE: NEGATIVE
HPV18 DNA CVX QL NAA+PROBE: NEGATIVE

## 2021-01-17 NOTE — RESULT ENCOUNTER NOTE
Alonso Newsome,     Your HPV testing is negative  Your pap is still pending, and will be updated soon  Please contact the office at [phone] with any questions       Finn

## 2021-01-18 LAB
LAB AP GYN PRIMARY INTERPRETATION: NORMAL
Lab: NORMAL

## 2021-02-19 DIAGNOSIS — R05.9 COUGH: ICD-10-CM

## 2021-02-19 DIAGNOSIS — I10 ESSENTIAL HYPERTENSION: ICD-10-CM

## 2021-02-19 RX ORDER — VALSARTAN 160 MG/1
TABLET ORAL
Qty: 90 TABLET | Refills: 1 | Status: SHIPPED | OUTPATIENT
Start: 2021-02-19 | End: 2021-07-02

## 2021-02-25 ENCOUNTER — HOSPITAL ENCOUNTER (OUTPATIENT)
Dept: MAMMOGRAPHY | Facility: HOSPITAL | Age: 66
Discharge: HOME | End: 2021-02-25
Attending: OBSTETRICS & GYNECOLOGY
Payer: COMMERCIAL

## 2021-02-25 VITALS — WEIGHT: 150 LBS | HEIGHT: 66 IN | BODY MASS INDEX: 24.11 KG/M2

## 2021-02-25 DIAGNOSIS — Z12.31 ENCOUNTER FOR SCREENING MAMMOGRAM FOR MALIGNANT NEOPLASM OF BREAST: ICD-10-CM

## 2021-02-25 PROCEDURE — 77063 BREAST TOMOSYNTHESIS BI: CPT

## 2021-02-25 PROCEDURE — 77067 SCR MAMMO BI INCL CAD: CPT

## 2021-03-10 DIAGNOSIS — Z23 ENCOUNTER FOR IMMUNIZATION: ICD-10-CM

## 2021-03-20 ENCOUNTER — IMMUNIZATIONS (OUTPATIENT)
Dept: FAMILY MEDICINE CLINIC | Facility: HOSPITAL | Age: 66
End: 2021-03-20

## 2021-03-20 DIAGNOSIS — Z23 ENCOUNTER FOR IMMUNIZATION: Primary | ICD-10-CM

## 2021-03-20 PROCEDURE — 0001A SARS-COV-2 / COVID-19 MRNA VACCINE (PFIZER-BIONTECH) 30 MCG: CPT

## 2021-03-20 PROCEDURE — 91300 SARS-COV-2 / COVID-19 MRNA VACCINE (PFIZER-BIONTECH) 30 MCG: CPT

## 2021-04-10 ENCOUNTER — IMMUNIZATIONS (OUTPATIENT)
Dept: FAMILY MEDICINE CLINIC | Facility: HOSPITAL | Age: 66
End: 2021-04-10

## 2021-04-10 DIAGNOSIS — Z23 ENCOUNTER FOR IMMUNIZATION: Primary | ICD-10-CM

## 2021-04-10 PROCEDURE — 0002A SARS-COV-2 / COVID-19 MRNA VACCINE (PFIZER-BIONTECH) 30 MCG: CPT

## 2021-04-10 PROCEDURE — 91300 SARS-COV-2 / COVID-19 MRNA VACCINE (PFIZER-BIONTECH) 30 MCG: CPT

## 2021-04-21 ENCOUNTER — TRANSCRIBE ORDERS (OUTPATIENT)
Dept: LAB | Facility: CLINIC | Age: 66
End: 2021-04-21

## 2021-04-21 ENCOUNTER — APPOINTMENT (OUTPATIENT)
Dept: LAB | Facility: CLINIC | Age: 66
End: 2021-04-21
Payer: COMMERCIAL

## 2021-04-21 DIAGNOSIS — E78.01 FAMILIAL HYPERCHOLESTEROLEMIA: ICD-10-CM

## 2021-04-21 DIAGNOSIS — I10 ESSENTIAL HYPERTENSION: ICD-10-CM

## 2021-04-21 LAB
ALBUMIN SERPL BCP-MCNC: 3.6 G/DL (ref 3.5–5)
ALP SERPL-CCNC: 67 U/L (ref 46–116)
ALT SERPL W P-5'-P-CCNC: 51 U/L (ref 12–78)
ANION GAP SERPL CALCULATED.3IONS-SCNC: 9 MMOL/L (ref 4–13)
AST SERPL W P-5'-P-CCNC: 21 U/L (ref 5–45)
BILIRUB SERPL-MCNC: 0.58 MG/DL (ref 0.2–1)
BUN SERPL-MCNC: 16 MG/DL (ref 5–25)
CALCIUM SERPL-MCNC: 8.7 MG/DL (ref 8.3–10.1)
CHLORIDE SERPL-SCNC: 108 MMOL/L (ref 100–108)
CHOLEST SERPL-MCNC: 174 MG/DL (ref 50–200)
CO2 SERPL-SCNC: 26 MMOL/L (ref 21–32)
CREAT SERPL-MCNC: 0.91 MG/DL (ref 0.6–1.3)
GFR SERPL CREATININE-BSD FRML MDRD: 67 ML/MIN/1.73SQ M
GLUCOSE P FAST SERPL-MCNC: 97 MG/DL (ref 65–99)
HDLC SERPL-MCNC: 49 MG/DL
LDLC SERPL CALC-MCNC: 88 MG/DL (ref 0–100)
NONHDLC SERPL-MCNC: 125 MG/DL
POTASSIUM SERPL-SCNC: 4.4 MMOL/L (ref 3.5–5.3)
PROT SERPL-MCNC: 7 G/DL (ref 6.4–8.2)
SODIUM SERPL-SCNC: 143 MMOL/L (ref 136–145)
TRIGL SERPL-MCNC: 185 MG/DL

## 2021-04-21 PROCEDURE — 36415 COLL VENOUS BLD VENIPUNCTURE: CPT

## 2021-04-21 PROCEDURE — 80053 COMPREHEN METABOLIC PANEL: CPT

## 2021-04-21 PROCEDURE — 80061 LIPID PANEL: CPT

## 2021-05-04 ENCOUNTER — OFFICE VISIT (OUTPATIENT)
Dept: INTERNAL MEDICINE CLINIC | Facility: CLINIC | Age: 66
End: 2021-05-04
Payer: COMMERCIAL

## 2021-05-04 VITALS
WEIGHT: 152 LBS | OXYGEN SATURATION: 98 % | DIASTOLIC BLOOD PRESSURE: 80 MMHG | TEMPERATURE: 97.5 F | HEART RATE: 69 BPM | SYSTOLIC BLOOD PRESSURE: 128 MMHG | BODY MASS INDEX: 24.43 KG/M2 | HEIGHT: 66 IN

## 2021-05-04 DIAGNOSIS — Z00.00 HEALTHCARE MAINTENANCE: Primary | ICD-10-CM

## 2021-05-04 DIAGNOSIS — E78.01 FAMILIAL HYPERCHOLESTEROLEMIA: ICD-10-CM

## 2021-05-04 DIAGNOSIS — I10 ESSENTIAL HYPERTENSION: ICD-10-CM

## 2021-05-04 PROCEDURE — 3074F SYST BP LT 130 MM HG: CPT | Performed by: INTERNAL MEDICINE

## 2021-05-04 PROCEDURE — 3725F SCREEN DEPRESSION PERFORMED: CPT | Performed by: INTERNAL MEDICINE

## 2021-05-04 PROCEDURE — 1036F TOBACCO NON-USER: CPT | Performed by: INTERNAL MEDICINE

## 2021-05-04 PROCEDURE — 3079F DIAST BP 80-89 MM HG: CPT | Performed by: INTERNAL MEDICINE

## 2021-05-04 PROCEDURE — 3008F BODY MASS INDEX DOCD: CPT | Performed by: INTERNAL MEDICINE

## 2021-05-04 PROCEDURE — 99214 OFFICE O/P EST MOD 30 MIN: CPT | Performed by: INTERNAL MEDICINE

## 2021-05-04 RX ORDER — ATORVASTATIN CALCIUM 10 MG/1
10 TABLET, FILM COATED ORAL DAILY
Qty: 90 TABLET | Refills: 3 | Status: SHIPPED | OUTPATIENT
Start: 2021-05-04 | End: 2021-10-04

## 2021-05-04 NOTE — ASSESSMENT & PLAN NOTE
Patient does have longstanding history of hypertension  Blood pressure is showing excellent control with present treatment  Patient has had no adverse effects from medication    We will continue present medication and continue to follow-up also   Her renal function

## 2021-05-04 NOTE — PROGRESS NOTES
Assessment/Plan:    Hyperlipidemia   Patient did have a lipid profile performed prior to the visit today  We did discuss the results and patient is already reviewed them over the Internet  Her triglyceride level is up to 183 and we did discuss with her the importance of watching closely her intake of fats and cholesterol but also concentrated sweets and simple carbohydrates with family history of diabetes  We will check this again when she returns the office in 4 months for a yearly physical   Cholesterol otherwise is under excellent control with present treatment  Hypertension    Patient does have longstanding history of hypertension  Blood pressure is showing excellent control with present treatment  Patient has had no adverse effects from medication  We will continue present medication and continue to follow-up also   Her renal function    Healthcare maintenance   Patient will be due for routine physical when she returns to the office in 4 months  She was given a slip for complete labs to be performed prior to that visit  She was told in the interim if any new medical complaints concerns or problems to please call  Patient is up-to-date with all routine screening including gynecologic care, mammograms, routine colonoscopy  Diagnoses and all orders for this visit:    Healthcare maintenance  -     Comprehensive metabolic panel; Future  -     CBC and differential; Future  -     Lipid panel; Future  -     UA (URINE) with reflex to Scope; Future  -     Occult Blood, Fecal Immunochemical; Future  -     TSH, 3rd generation with Free T4 reflex; Future    Familial hypercholesterolemia  -     atorvastatin (LIPITOR) 10 mg tablet; Take 1 tablet (10 mg total) by mouth daily  -     Lipid panel; Future  -     TSH, 3rd generation with Free T4 reflex; Future    Essential hypertension          Subjective:      Patient ID: Samantha Dowling is a 63 y o  female        Patient is a 61-year-old female with history of medical problems as outlined previously  Patient is here today for routine follow-up after four-month period of time  She did have labs performed prior to the visit today looking at her cholesterol  We did discuss the results with the patient  Patient states in general she is doing well and offers no new medical complaints or concerns  The following portions of the patient's history were reviewed and updated as appropriate:   She  has a past medical history of Hyperlipemia and Hypertension  She   Patient Active Problem List    Diagnosis Date Noted    Bilateral thumb pain 2020    Lateral epicondylitis of both elbows 2020    Cough 2020    Left chest pressure 2019    Acute recurrent frontal sinusitis 2019    Localized, primary osteoarthritis of hand 2018    Hyperplastic polyp of ascending colon 2018    Healthcare maintenance 2018    Gastritis, acute 2017    Hyperlipidemia 2013    Hypertension 2012     She  has a past surgical history that includes Hysteroscopy w/ endometrial ablation;  section; and Colonoscopy (2020)  Her family history includes Colon cancer (age of onset: 48) in her cousin; Diabetes in her father and mother; Heart disease in her father; Lung cancer (age of onset: 48) in her cousin; No Known Problems in her son and son; Ovarian cancer (age of onset: 61) in her paternal aunt; Pancreatic cancer (age of onset: 66) in her paternal aunt  She  reports that she has never smoked  She has never used smokeless tobacco  She reports current alcohol use  She reports that she does not use drugs  Current Outpatient Medications   Medication Sig Dispense Refill    atorvastatin (LIPITOR) 10 mg tablet Take 1 tablet (10 mg total) by mouth daily 90 tablet 3    valsartan (DIOVAN) 160 mg tablet TAKE 1 TABLET BY MOUTH EVERY DAY 90 tablet 1     No current facility-administered medications for this visit        Current Outpatient Medications on File Prior to Visit   Medication Sig    valsartan (DIOVAN) 160 mg tablet TAKE 1 TABLET BY MOUTH EVERY DAY    [DISCONTINUED] atorvastatin (LIPITOR) 10 mg tablet Take 1 tablet (10 mg total) by mouth every other day (Patient taking differently: Take 10 mg by mouth daily )     No current facility-administered medications on file prior to visit  She is allergic to grass extracts [gramineae pollens]; mold extract [trichophyton]; and other       Review of Systems   Constitutional: Negative  HENT: Negative  Eyes: Negative  Respiratory: Negative  Cardiovascular: Negative  Gastrointestinal: Negative  Endocrine: Negative  Genitourinary: Negative  Musculoskeletal: Negative  Skin: Negative  Allergic/Immunologic: Negative  Neurological: Negative  Hematological: Negative  Psychiatric/Behavioral: Negative  Objective:      /80   Pulse 69   Temp 97 5 °F (36 4 °C) (Tympanic)   Ht 5' 5 5" (1 664 m)   Wt 68 9 kg (152 lb)   SpO2 98%   BMI 24 91 kg/m²          Physical Exam  Vitals signs and nursing note reviewed  Constitutional:       General: She is not in acute distress  Appearance: Normal appearance  She is normal weight  She is not ill-appearing, toxic-appearing or diaphoretic  Comments: Pleasant articulate 28-year-old female who is awake alert no acute distress and oriented x3   HENT:      Head: Normocephalic and atraumatic  Right Ear: Tympanic membrane, ear canal and external ear normal  There is no impacted cerumen  Left Ear: Tympanic membrane, ear canal and external ear normal  There is no impacted cerumen  Nose: Nose normal  No congestion or rhinorrhea  Mouth/Throat:      Mouth: Mucous membranes are moist       Pharynx: Oropharynx is clear  No oropharyngeal exudate or posterior oropharyngeal erythema  Eyes:      General: No scleral icterus  Right eye: No discharge  Left eye: No discharge  Extraocular Movements: Extraocular movements intact  Conjunctiva/sclera: Conjunctivae normal       Pupils: Pupils are equal, round, and reactive to light  Neck:      Musculoskeletal: Normal range of motion and neck supple  No neck rigidity or muscular tenderness  Vascular: No carotid bruit  Cardiovascular:      Rate and Rhythm: Normal rate and regular rhythm  Pulses: Normal pulses  Heart sounds: Normal heart sounds  No murmur  No friction rub  No gallop  Pulmonary:      Effort: Pulmonary effort is normal  No respiratory distress  Breath sounds: Normal breath sounds  No stridor  No wheezing, rhonchi or rales  Chest:      Chest wall: No tenderness  Abdominal:      General: Abdomen is flat  Bowel sounds are normal  There is no distension  Palpations: Abdomen is soft  There is no mass  Tenderness: There is no abdominal tenderness  There is no right CVA tenderness, left CVA tenderness, guarding or rebound  Hernia: No hernia is present  Musculoskeletal: Normal range of motion  General: No swelling, tenderness, deformity or signs of injury  Right lower leg: No edema  Left lower leg: No edema  Lymphadenopathy:      Cervical: No cervical adenopathy  Skin:     General: Skin is warm and dry  Capillary Refill: Capillary refill takes less than 2 seconds  Coloration: Skin is not jaundiced or pale  Findings: No bruising, erythema, lesion or rash  Neurological:      General: No focal deficit present  Mental Status: She is alert and oriented to person, place, and time  Mental status is at baseline  Cranial Nerves: No cranial nerve deficit  Sensory: No sensory deficit  Motor: No weakness  Coordination: Coordination normal       Gait: Gait normal       Deep Tendon Reflexes: Reflexes normal    Psychiatric:         Mood and Affect: Mood normal          Thought Content:  Thought content normal          Judgment: Judgment normal  99

## 2021-05-04 NOTE — ASSESSMENT & PLAN NOTE
Patient did have a lipid profile performed prior to the visit today  We did discuss the results and patient is already reviewed them over the Internet  Her triglyceride level is up to 183 and we did discuss with her the importance of watching closely her intake of fats and cholesterol but also concentrated sweets and simple carbohydrates with family history of diabetes  We will check this again when she returns the office in 4 months for a yearly physical   Cholesterol otherwise is under excellent control with present treatment

## 2021-05-04 NOTE — ASSESSMENT & PLAN NOTE
Patient will be due for routine physical when she returns to the office in 4 months  She was given a slip for complete labs to be performed prior to that visit  She was told in the interim if any new medical complaints concerns or problems to please call  Patient is up-to-date with all routine screening including gynecologic care, mammograms, routine colonoscopy

## 2021-07-09 ENCOUNTER — OFFICE VISIT (OUTPATIENT)
Dept: INTERNAL MEDICINE CLINIC | Facility: CLINIC | Age: 66
End: 2021-07-09
Payer: COMMERCIAL

## 2021-07-09 VITALS
HEIGHT: 66 IN | RESPIRATION RATE: 16 BRPM | DIASTOLIC BLOOD PRESSURE: 84 MMHG | WEIGHT: 150 LBS | HEART RATE: 75 BPM | BODY MASS INDEX: 24.11 KG/M2 | SYSTOLIC BLOOD PRESSURE: 136 MMHG | OXYGEN SATURATION: 97 %

## 2021-07-09 DIAGNOSIS — L50.9 HIVES: Primary | ICD-10-CM

## 2021-07-09 PROCEDURE — 99213 OFFICE O/P EST LOW 20 MIN: CPT | Performed by: NURSE PRACTITIONER

## 2021-07-09 PROCEDURE — 3079F DIAST BP 80-89 MM HG: CPT | Performed by: NURSE PRACTITIONER

## 2021-07-09 PROCEDURE — 3075F SYST BP GE 130 - 139MM HG: CPT | Performed by: NURSE PRACTITIONER

## 2021-07-09 PROCEDURE — 3008F BODY MASS INDEX DOCD: CPT | Performed by: NURSE PRACTITIONER

## 2021-07-09 PROCEDURE — 1036F TOBACCO NON-USER: CPT | Performed by: NURSE PRACTITIONER

## 2021-07-09 RX ORDER — METHYLPREDNISOLONE 4 MG/1
TABLET ORAL
Qty: 21 EACH | Refills: 0 | Status: SHIPPED | OUTPATIENT
Start: 2021-07-09 | End: 2021-09-24 | Stop reason: ALTCHOICE

## 2021-07-09 NOTE — ASSESSMENT & PLAN NOTE
Hives on face and neck with onset Tuesday night after having done yard work earlier that day  Appearance suggests allergic reaction, possibly exposure to plants or airborne allergens such as mold  Since the primary site is around the eye, will treat with a medrol dose pack to reduce inflammation  She can continue with benadryl cream prn, can also use 2nd generation antihistamine or pepcid for additional relief of itching  Cool compresses as needed  Advised to clean linens in hot water    Call if sx worsen or persist

## 2021-07-09 NOTE — PROGRESS NOTES
Assessment/Plan:    Hives  Hives on face and neck with onset Tuesday night after having done yard work earlier that day  Appearance suggests allergic reaction, possibly exposure to plants or airborne allergens such as mold  Since the primary site is around the eye, will treat with a medrol dose pack to reduce inflammation  She can continue with benadryl cream prn, can also use 2nd generation antihistamine or pepcid for additional relief of itching  Cool compresses as needed  Advised to clean linens in hot water  Call if sx worsen or persist          Diagnoses and all orders for this visit:    Hives  -     methylPREDNISolone 4 MG tablet therapy pack; Use as directed on package          Subjective:      Patient ID: Zuleima Stovall is a 63 y o  female  Pt is a 62 y/o female here for evaluation of a rash on her face  She was out on Tuesday doing yard work and trimming bushes and pulling weeks  She noticed swelling and itching at the outer corner of the left eye  She thought it was a bug bite and has been putting benadryl cream on it which helps with the itching  She has since developed other areas on the face and neck  Her eye is less swollen today, but she says her eye has had some clear discharge from her eye and the vision in that eye was blurry this morning  Vision is now back to normal   She feels she must have come in contact with an irritating plant  The following portions of the patient's history were reviewed and updated as appropriate: allergies, current medications, past family history, past medical history, past social history, past surgical history and problem list     Review of Systems   Constitutional: Negative for chills and fever  Eyes: Positive for discharge and itching  Negative for photophobia, pain, redness and visual disturbance  Skin: Positive for rash           Objective:      /84   Pulse 75   Resp 16   Ht 5' 5 5" (1 664 m)   Wt 68 kg (150 lb)   SpO2 97%   BMI 24 58 kg/m²          Physical Exam  Vitals reviewed  Constitutional:       General: She is awake  She is not in acute distress  Appearance: Normal appearance  She is well-developed and well-groomed  She is not ill-appearing  Eyes:      Conjunctiva/sclera: Conjunctivae normal       Comments: Mild swelling outside of the left lateral eye with rash noted medially and laterally    Neck:      Vascular: No carotid bruit or JVD  Cardiovascular:      Rate and Rhythm: Normal rate and regular rhythm  Pulses: Normal pulses  Heart sounds: Normal heart sounds  Pulmonary:      Effort: Pulmonary effort is normal       Breath sounds: Normal breath sounds  Skin:     General: Skin is warm and dry  Findings: Rash present  Rash is urticarial       Comments: Raised erythematous rash consistent with hives at the left medial and lateral eye, left check, and on the sides of the neck  No open skin, no blistering or drainage  Neurological:      Mental Status: She is alert and oriented to person, place, and time  Psychiatric:         Attention and Perception: Attention normal          Mood and Affect: Mood normal          Speech: Speech normal          Behavior: Behavior normal  Behavior is cooperative  Thought Content:  Thought content normal          Cognition and Memory: Cognition normal          Judgment: Judgment normal

## 2021-09-16 ENCOUNTER — APPOINTMENT (OUTPATIENT)
Dept: LAB | Facility: CLINIC | Age: 66
End: 2021-09-16
Payer: COMMERCIAL

## 2021-09-16 ENCOUNTER — APPOINTMENT (OUTPATIENT)
Dept: LAB | Facility: AMBULARY SURGERY CENTER | Age: 66
End: 2021-09-16
Payer: COMMERCIAL

## 2021-09-16 DIAGNOSIS — E78.01 FAMILIAL HYPERCHOLESTEROLEMIA: ICD-10-CM

## 2021-09-16 DIAGNOSIS — Z00.00 HEALTHCARE MAINTENANCE: ICD-10-CM

## 2021-09-16 LAB
ALBUMIN SERPL BCP-MCNC: 3.7 G/DL (ref 3.5–5)
ALP SERPL-CCNC: 58 U/L (ref 46–116)
ALT SERPL W P-5'-P-CCNC: 38 U/L (ref 12–78)
ANION GAP SERPL CALCULATED.3IONS-SCNC: 8 MMOL/L (ref 4–13)
AST SERPL W P-5'-P-CCNC: 21 U/L (ref 5–45)
BACTERIA UR QL AUTO: ABNORMAL /HPF
BASOPHILS # BLD AUTO: 0.08 THOUSANDS/ΜL (ref 0–0.1)
BASOPHILS NFR BLD AUTO: 2 % (ref 0–1)
BILIRUB SERPL-MCNC: 0.4 MG/DL (ref 0.2–1)
BILIRUB UR QL STRIP: NEGATIVE
BUN SERPL-MCNC: 21 MG/DL (ref 5–25)
CALCIUM SERPL-MCNC: 9.1 MG/DL (ref 8.3–10.1)
CHLORIDE SERPL-SCNC: 106 MMOL/L (ref 100–108)
CHOLEST SERPL-MCNC: 181 MG/DL (ref 50–200)
CLARITY UR: CLEAR
CO2 SERPL-SCNC: 28 MMOL/L (ref 21–32)
COLOR UR: YELLOW
CREAT SERPL-MCNC: 0.99 MG/DL (ref 0.6–1.3)
EOSINOPHIL # BLD AUTO: 0.27 THOUSAND/ΜL (ref 0–0.61)
EOSINOPHIL NFR BLD AUTO: 5 % (ref 0–6)
ERYTHROCYTE [DISTWIDTH] IN BLOOD BY AUTOMATED COUNT: 11.9 % (ref 11.6–15.1)
GFR SERPL CREATININE-BSD FRML MDRD: 61 ML/MIN/1.73SQ M
GLUCOSE P FAST SERPL-MCNC: 106 MG/DL (ref 65–99)
GLUCOSE UR STRIP-MCNC: NEGATIVE MG/DL
HCT VFR BLD AUTO: 40.7 % (ref 34.8–46.1)
HDLC SERPL-MCNC: 43 MG/DL
HEMOCCULT STL QL IA: NEGATIVE
HGB BLD-MCNC: 13.2 G/DL (ref 11.5–15.4)
HGB UR QL STRIP.AUTO: NEGATIVE
IMM GRANULOCYTES # BLD AUTO: 0.01 THOUSAND/UL (ref 0–0.2)
IMM GRANULOCYTES NFR BLD AUTO: 0 % (ref 0–2)
KETONES UR STRIP-MCNC: NEGATIVE MG/DL
LDLC SERPL CALC-MCNC: 110 MG/DL (ref 0–100)
LEUKOCYTE ESTERASE UR QL STRIP: ABNORMAL
LYMPHOCYTES # BLD AUTO: 2.15 THOUSANDS/ΜL (ref 0.6–4.47)
LYMPHOCYTES NFR BLD AUTO: 39 % (ref 14–44)
MCH RBC QN AUTO: 30.3 PG (ref 26.8–34.3)
MCHC RBC AUTO-ENTMCNC: 32.4 G/DL (ref 31.4–37.4)
MCV RBC AUTO: 93 FL (ref 82–98)
MONOCYTES # BLD AUTO: 0.53 THOUSAND/ΜL (ref 0.17–1.22)
MONOCYTES NFR BLD AUTO: 10 % (ref 4–12)
NEUTROPHILS # BLD AUTO: 2.45 THOUSANDS/ΜL (ref 1.85–7.62)
NEUTS SEG NFR BLD AUTO: 44 % (ref 43–75)
NITRITE UR QL STRIP: NEGATIVE
NON-SQ EPI CELLS URNS QL MICRO: ABNORMAL /HPF
NONHDLC SERPL-MCNC: 138 MG/DL
NRBC BLD AUTO-RTO: 0 /100 WBCS
PH UR STRIP.AUTO: 5.5 [PH]
PLATELET # BLD AUTO: 272 THOUSANDS/UL (ref 149–390)
PMV BLD AUTO: 10 FL (ref 8.9–12.7)
POTASSIUM SERPL-SCNC: 4.4 MMOL/L (ref 3.5–5.3)
PROT SERPL-MCNC: 6.9 G/DL (ref 6.4–8.2)
PROT UR STRIP-MCNC: NEGATIVE MG/DL
RBC # BLD AUTO: 4.36 MILLION/UL (ref 3.81–5.12)
RBC #/AREA URNS AUTO: ABNORMAL /HPF
SODIUM SERPL-SCNC: 142 MMOL/L (ref 136–145)
SP GR UR STRIP.AUTO: 1.02 (ref 1–1.03)
TRIGL SERPL-MCNC: 140 MG/DL
TSH SERPL DL<=0.05 MIU/L-ACNC: 1.47 UIU/ML (ref 0.36–3.74)
UROBILINOGEN UR QL STRIP.AUTO: 0.2 E.U./DL
WBC # BLD AUTO: 5.49 THOUSAND/UL (ref 4.31–10.16)
WBC #/AREA URNS AUTO: ABNORMAL /HPF

## 2021-09-16 PROCEDURE — 84443 ASSAY THYROID STIM HORMONE: CPT

## 2021-09-16 PROCEDURE — 80053 COMPREHEN METABOLIC PANEL: CPT

## 2021-09-16 PROCEDURE — 36415 COLL VENOUS BLD VENIPUNCTURE: CPT

## 2021-09-16 PROCEDURE — 80061 LIPID PANEL: CPT

## 2021-09-16 PROCEDURE — 85025 COMPLETE CBC W/AUTO DIFF WBC: CPT

## 2021-09-16 PROCEDURE — 81001 URINALYSIS AUTO W/SCOPE: CPT

## 2021-09-16 PROCEDURE — G0328 FECAL BLOOD SCRN IMMUNOASSAY: HCPCS

## 2021-09-24 ENCOUNTER — IMMUNIZATIONS (OUTPATIENT)
Dept: INTERNAL MEDICINE CLINIC | Facility: CLINIC | Age: 66
End: 2021-09-24
Payer: COMMERCIAL

## 2021-09-24 DIAGNOSIS — Z23 ENCOUNTER FOR IMMUNIZATION: Primary | ICD-10-CM

## 2021-09-24 PROCEDURE — 90682 RIV4 VACC RECOMBINANT DNA IM: CPT | Performed by: INTERNAL MEDICINE

## 2021-09-24 PROCEDURE — 90471 IMMUNIZATION ADMIN: CPT | Performed by: INTERNAL MEDICINE

## 2021-10-04 ENCOUNTER — OFFICE VISIT (OUTPATIENT)
Dept: INTERNAL MEDICINE CLINIC | Facility: CLINIC | Age: 66
End: 2021-10-04
Payer: COMMERCIAL

## 2021-10-04 VITALS
TEMPERATURE: 97.4 F | WEIGHT: 152 LBS | SYSTOLIC BLOOD PRESSURE: 126 MMHG | HEART RATE: 86 BPM | OXYGEN SATURATION: 98 % | BODY MASS INDEX: 24.43 KG/M2 | DIASTOLIC BLOOD PRESSURE: 70 MMHG | HEIGHT: 66 IN

## 2021-10-04 DIAGNOSIS — R73.9 HYPERGLYCEMIA: ICD-10-CM

## 2021-10-04 DIAGNOSIS — Z11.4 SCREENING FOR HIV (HUMAN IMMUNODEFICIENCY VIRUS): ICD-10-CM

## 2021-10-04 DIAGNOSIS — Z11.59 NEED FOR HEPATITIS C SCREENING TEST: Primary | ICD-10-CM

## 2021-10-04 DIAGNOSIS — Z00.00 HEALTHCARE MAINTENANCE: ICD-10-CM

## 2021-10-04 DIAGNOSIS — I10 PRIMARY HYPERTENSION: ICD-10-CM

## 2021-10-04 DIAGNOSIS — E78.01 FAMILIAL HYPERCHOLESTEROLEMIA: ICD-10-CM

## 2021-10-04 DIAGNOSIS — J01.11 ACUTE RECURRENT FRONTAL SINUSITIS: ICD-10-CM

## 2021-10-04 PROCEDURE — 3008F BODY MASS INDEX DOCD: CPT | Performed by: NURSE PRACTITIONER

## 2021-10-04 PROCEDURE — 99396 PREV VISIT EST AGE 40-64: CPT | Performed by: INTERNAL MEDICINE

## 2021-10-08 ENCOUNTER — TELEMEDICINE (OUTPATIENT)
Dept: INTERNAL MEDICINE CLINIC | Facility: CLINIC | Age: 66
End: 2021-10-08
Payer: COMMERCIAL

## 2021-10-08 DIAGNOSIS — Z20.822 EXPOSURE TO COVID-19 VIRUS: Primary | ICD-10-CM

## 2021-10-08 PROCEDURE — 1036F TOBACCO NON-USER: CPT | Performed by: NURSE PRACTITIONER

## 2021-10-08 PROCEDURE — 99213 OFFICE O/P EST LOW 20 MIN: CPT | Performed by: NURSE PRACTITIONER

## 2021-10-08 RX ORDER — ATORVASTATIN CALCIUM 10 MG/1
10 TABLET, FILM COATED ORAL DAILY
COMMUNITY
End: 2022-04-05 | Stop reason: SDUPTHER

## 2021-10-08 RX ORDER — AZITHROMYCIN 250 MG/1
TABLET, FILM COATED ORAL
Qty: 6 TABLET | Refills: 0 | Status: SHIPPED | OUTPATIENT
Start: 2021-10-08 | End: 2021-10-12

## 2021-10-09 PROCEDURE — U0005 INFEC AGEN DETEC AMPLI PROBE: HCPCS | Performed by: NURSE PRACTITIONER

## 2021-10-09 PROCEDURE — U0003 INFECTIOUS AGENT DETECTION BY NUCLEIC ACID (DNA OR RNA); SEVERE ACUTE RESPIRATORY SYNDROME CORONAVIRUS 2 (SARS-COV-2) (CORONAVIRUS DISEASE [COVID-19]), AMPLIFIED PROBE TECHNIQUE, MAKING USE OF HIGH THROUGHPUT TECHNOLOGIES AS DESCRIBED BY CMS-2020-01-R: HCPCS | Performed by: NURSE PRACTITIONER

## 2021-12-31 ENCOUNTER — NURSE TRIAGE (OUTPATIENT)
Dept: OTHER | Facility: OTHER | Age: 66
End: 2021-12-31

## 2021-12-31 DIAGNOSIS — Z20.822 ENCOUNTER FOR SCREENING LABORATORY TESTING FOR COVID-19 VIRUS: Primary | ICD-10-CM

## 2022-01-02 DIAGNOSIS — R05.9 COUGH: ICD-10-CM

## 2022-01-02 DIAGNOSIS — I10 ESSENTIAL HYPERTENSION: ICD-10-CM

## 2022-01-02 PROCEDURE — U0003 INFECTIOUS AGENT DETECTION BY NUCLEIC ACID (DNA OR RNA); SEVERE ACUTE RESPIRATORY SYNDROME CORONAVIRUS 2 (SARS-COV-2) (CORONAVIRUS DISEASE [COVID-19]), AMPLIFIED PROBE TECHNIQUE, MAKING USE OF HIGH THROUGHPUT TECHNOLOGIES AS DESCRIBED BY CMS-2020-01-R: HCPCS | Performed by: INTERNAL MEDICINE

## 2022-01-02 PROCEDURE — U0005 INFEC AGEN DETEC AMPLI PROBE: HCPCS | Performed by: INTERNAL MEDICINE

## 2022-01-03 RX ORDER — VALSARTAN 160 MG/1
TABLET ORAL
Qty: 90 TABLET | Refills: 1 | Status: SHIPPED | OUTPATIENT
Start: 2022-01-03 | End: 2022-04-05 | Stop reason: SDUPTHER

## 2022-01-04 ENCOUNTER — TELEMEDICINE (OUTPATIENT)
Dept: INTERNAL MEDICINE CLINIC | Facility: CLINIC | Age: 67
End: 2022-01-04
Payer: COMMERCIAL

## 2022-01-04 VITALS — BODY MASS INDEX: 24.43 KG/M2 | HEIGHT: 66 IN | WEIGHT: 152 LBS

## 2022-01-04 DIAGNOSIS — U07.1 COVID: Primary | ICD-10-CM

## 2022-01-04 PROCEDURE — 1036F TOBACCO NON-USER: CPT | Performed by: INTERNAL MEDICINE

## 2022-01-04 PROCEDURE — 3008F BODY MASS INDEX DOCD: CPT | Performed by: INTERNAL MEDICINE

## 2022-01-04 PROCEDURE — 99213 OFFICE O/P EST LOW 20 MIN: CPT | Performed by: INTERNAL MEDICINE

## 2022-01-04 NOTE — ASSESSMENT & PLAN NOTE
The symptoms started last Wednesday  Had episodes of severe nausea vomiting and perfuse diarrhea  She had some slight abdominal cramping along with this  Went for testing and is COVID positive  States from a GI standpoint she is doing much better  No further nausea vomiting or diarrhea  Is feeling somewhat fatigued but appetite is back to normal   No loss of sense of smell or taste no myalgias or arthralgias at the most a low-grade temperature up to 100 point 2  I did discuss with the patient the importance of treatment at this point time  Continuing with her vitamin-C, vitamins D, zinc and a 81 mg aspirin daily  Keeping well hydrated and meeting her nutritional needs  Call if any change in symptomatology  She is keeping isolated from her  appropriately and he has had no symptoms at this time

## 2022-01-04 NOTE — PROGRESS NOTES
COVID-19 Outpatient Progress Note    Assessment/Plan:    Problem List Items Addressed This Visit     None         Disposition:     I recommended self-quarantine for 10 days and to watch for symptoms until 14 days after exposure  If patient were to develop symptoms, they should self isolate and call our office for further guidance  I have spent 20 minutes directly with the patient  Greater than 50% of this time was spent in counseling/coordination of care regarding: diagnostic results, prognosis, risks and benefits of treatment options, instructions for management, patient and family education, importance of treatment compliance, risk factor reductions and impressions  Encounter provider Paula Jensen DO    Provider located at [address] [address]    Recent Visits  No visits were found meeting these conditions  Showing recent visits within past 7 days and meeting all other requirements  Today's Visits  Date Type Provider Dept   01/04/22 Telemedicine Paula Jensen [address]   Showing today's visits and meeting all other requirements  Future Appointments  No visits were found meeting these conditions  Showing future appointments within next 150 days and meeting all other requirements     This virtual check-in was done via [employer] and patient was informed that this is a secure, HIPAA-compliant platform  She agrees to proceed  Patient agrees to participate in a virtual check in via telephone or video visit instead of presenting to the office to address urgent/immediate medical needs  Patient is aware this is a billable service  After connecting through [facility], the patient was identified by name and date of birth  Sergei Haywood was informed that this was a telemedicine visit and that the exam was being conducted confidentially over secure lines   Sergei Haywood acknowledged consent and understanding of privacy and security of the telemedicine visit  I informed the patient that I have reviewed her record in Epic and presented the opportunity for her to ask any questions regarding the visit today  The patient agreed to participate  Verification of patient location:  Patient is located in the following state in which I hold an active license: [place]    Subjective:   Prakash Bhatia is a 61 y o  female who is concerned about COVID-19  Patient's symptoms include abdominal pain, nausea, vomiting and diarrhea  Patient denies fever, chills, fatigue, malaise, congestion, rhinorrhea, sore throat, anosmia, loss of taste, cough, shortness of breath, chest tightness, myalgias and headaches  Date of symptom onset: 2021  COVID-19 vaccination status: Fully vaccinated (primary series)    Exposure:   Contact with a person who is under investigation (PUI) for or who is positive for COVID-19 within the last 14 days?: No    Hospitalized recently for fever and/or lower respiratory symptoms?: No      Currently a healthcare worker that is involved in direct patient care?: No      Works in a special setting where the risk of COVID-19 transmission may be high? (this may include long-term care, correctional and [facility] facilities; homeless shelters; assisted-living facilities and group homes ): No      Resident in a special setting where the risk of COVID-19 transmission may be high? (this may include long-term care, correctional and [facility] facilities; homeless shelters; assisted-living facilities and group homes ): No      Patient relates that her symptoms have almost completely resolved at this point in time      Lab Results   Component Value Date    SARSCOV2 Positive (A) 2022    SARSCOV2 Not Detected 2020     Past Medical History:   Diagnosis Date    Hyperlipemia     Hypertension      Past Surgical History:   Procedure Laterality Date     SECTION      COLONOSCOPY  2020    Dr Heather Wolfe , 5 yr f/u  HYSTEROSCOPY W/ ENDOMETRIAL ABLATION       Current Outpatient Medications   Medication Sig Dispense Refill    atorvastatin (LIPITOR) 10 mg tablet Take 10 mg by mouth daily      valsartan (DIOVAN) 160 mg tablet TAKE 1 TABLET BY MOUTH EVERY DAY 90 tablet 1    atorvastatin (LIPITOR) 10 mg tablet Take 1 tablet (10 mg total) by mouth daily 90 tablet 3     No current facility-administered medications for this visit  Allergies   Allergen Reactions    Grass Extracts [Gramineae Pollens]     Mold Extract [Trichophyton]     Other      Trees  [place] trees       Review of Systems   Constitutional: Negative for chills, fatigue and fever  HENT: Negative for congestion, rhinorrhea and sore throat  Respiratory: Negative for cough, chest tightness and shortness of breath  Gastrointestinal: Positive for abdominal pain, diarrhea, nausea and vomiting  Musculoskeletal: Negative for myalgias  Neurological: Negative for headaches  Objective:    Vitals:    01/04/22 1001   Weight: 68 9 kg (152 lb)   Height: 5' 5 5" (1 664 m)       Physical Exam  Vitals and nursing note reviewed  Constitutional:       General: She is not in acute distress  Appearance: Normal appearance  She is not ill-appearing, toxic-appearing or diaphoretic  Comments: Patient is awake alert cheerful in no acute distress and oriented x3  No respiratory symptoms noted during exam   HENT:      Head: Normocephalic  Neurological:      Mental Status: She is alert and oriented to person, place, and time  Psychiatric:         Mood and Affect: Mood normal          Behavior: Behavior normal          Thought Content: Thought content normal          Judgment: Judgment normal          VIRTUAL VISIT [address] verbally agrees to participate in [employer]   Pt is aware that [employer] could be limited without vital signs or the ability to perform a full hands-on physical Anders Stallworth understands she or the provider may request at any time to terminate the video visit and request the patient to seek care or treatment in person

## 2022-03-17 ENCOUNTER — OFFICE VISIT (OUTPATIENT)
Dept: OBGYN CLINIC | Facility: HOSPITAL | Age: 67
End: 2022-03-17
Payer: COMMERCIAL

## 2022-03-17 DIAGNOSIS — M67.442 DIGITAL MUCOUS CYST OF FINGER OF LEFT HAND: Primary | ICD-10-CM

## 2022-03-17 PROCEDURE — 1036F TOBACCO NON-USER: CPT | Performed by: PHYSICIAN ASSISTANT

## 2022-03-17 PROCEDURE — 99203 OFFICE O/P NEW LOW 30 MIN: CPT | Performed by: PHYSICIAN ASSISTANT

## 2022-03-17 NOTE — PROGRESS NOTES
Assessment:    Left index finger digital mucoid cyst      Plan:    Cyst spontaneously drained on its own earlier this week   Her pain level, and the cyst has decreased significantly   There is no nail bed involvement on examination today   Recommend treatment with observation and warm compress to the area if it starts to increase in size   Can follow-up with us on a p r n  basis  Problem List Items Addressed This Visit        Other    Digital mucous cyst of finger of left hand - Primary                   Subjective:     Patient ID:  Augustina Joseph is a 61 y o  female    HPI    16-year-old female presenting for evaluation for left index finger  She states she has a history of mucoid cyst on her left index D IP joint for about six years now and has increased and decreased in size over this time frame  She was set up for cyst excision in 2018 with Dr Kyle Wilson of [place] [address]  however she canceled this appointment due to unknown reasons  She states over the last several months to year it has increased in size however it spontaneously drained   Earlier this week improvement in pain and size of the cyst     He denies ever having any nail bed involvement, and it never looked infected  No fevers or chills  There is no associated numbness and tingling in the fingertips  The following portions of the patient's history were reviewed and updated as appropriate: allergies, current medications, past family history, past medical history, past social history, past surgical history and problem list     Review of Systems     Objective:    Imaging:  None        Physical Exam     Orthopedic Examination:  Left index finger    Inspection:  There is very small mucoid cyst located at the D IP joint  There is no open wound or surrounding erythema  There is no nail bed involvement      Palpation:  Cyst is nontender to palpation    Range-of-motion:  Full range of motion of the IP joint without restriction or pain    Strength:  5/5 finger flexion extension    Sensation:  Intact median radial ulnar nerve distribution    Special Tests:  Palpable radial pulse   Good cap refill to fingertips

## 2022-03-28 ENCOUNTER — APPOINTMENT (OUTPATIENT)
Dept: LAB | Facility: CLINIC | Age: 67
End: 2022-03-28
Payer: COMMERCIAL

## 2022-03-28 DIAGNOSIS — E78.01 FAMILIAL HYPERCHOLESTEROLEMIA: ICD-10-CM

## 2022-03-28 DIAGNOSIS — Z11.4 SCREENING FOR HIV (HUMAN IMMUNODEFICIENCY VIRUS): ICD-10-CM

## 2022-03-28 DIAGNOSIS — R73.9 HYPERGLYCEMIA: ICD-10-CM

## 2022-03-28 DIAGNOSIS — Z11.59 NEED FOR HEPATITIS C SCREENING TEST: ICD-10-CM

## 2022-03-28 LAB
ANION GAP SERPL CALCULATED.3IONS-SCNC: 2 MMOL/L (ref 4–13)
BUN SERPL-MCNC: 19 MG/DL (ref 5–25)
CALCIUM SERPL-MCNC: 8.9 MG/DL (ref 8.3–10.1)
CHLORIDE SERPL-SCNC: 106 MMOL/L (ref 100–108)
CHOLEST SERPL-MCNC: 167 MG/DL
CO2 SERPL-SCNC: 29 MMOL/L (ref 21–32)
CREAT SERPL-MCNC: 0.96 MG/DL (ref 0.6–1.3)
EST. AVERAGE GLUCOSE BLD GHB EST-MCNC: 117 MG/DL
GFR SERPL CREATININE-BSD FRML MDRD: 62 ML/MIN/1.73SQ M
GLUCOSE P FAST SERPL-MCNC: 108 MG/DL (ref 65–99)
HBA1C MFR BLD: 5.7 %
HCV AB SER QL: NORMAL
HDLC SERPL-MCNC: 58 MG/DL
LDLC SERPL CALC-MCNC: 93 MG/DL (ref 0–100)
NONHDLC SERPL-MCNC: 109 MG/DL
POTASSIUM SERPL-SCNC: 4.4 MMOL/L (ref 3.5–5.3)
SODIUM SERPL-SCNC: 137 MMOL/L (ref 136–145)
TRIGL SERPL-MCNC: 78 MG/DL

## 2022-03-28 PROCEDURE — 36415 COLL VENOUS BLD VENIPUNCTURE: CPT

## 2022-03-28 PROCEDURE — 80048 BASIC METABOLIC PNL TOTAL CA: CPT

## 2022-03-28 PROCEDURE — 80061 LIPID PANEL: CPT

## 2022-03-28 PROCEDURE — 86803 HEPATITIS C AB TEST: CPT

## 2022-03-28 PROCEDURE — 83036 HEMOGLOBIN GLYCOSYLATED A1C: CPT

## 2022-03-28 PROCEDURE — 87389 HIV-1 AG W/HIV-1&-2 AB AG IA: CPT

## 2022-03-29 LAB — HIV 1+2 AB+HIV1 P24 AG SERPL QL IA: NORMAL

## 2022-04-05 ENCOUNTER — OFFICE VISIT (OUTPATIENT)
Dept: INTERNAL MEDICINE CLINIC | Facility: CLINIC | Age: 67
End: 2022-04-05
Payer: COMMERCIAL

## 2022-04-05 VITALS
HEART RATE: 68 BPM | SYSTOLIC BLOOD PRESSURE: 130 MMHG | WEIGHT: 144 LBS | BODY MASS INDEX: 23.14 KG/M2 | TEMPERATURE: 97.4 F | DIASTOLIC BLOOD PRESSURE: 82 MMHG | OXYGEN SATURATION: 97 % | HEIGHT: 66 IN

## 2022-04-05 DIAGNOSIS — Z12.31 ENCOUNTER FOR SCREENING MAMMOGRAM FOR BREAST CANCER: ICD-10-CM

## 2022-04-05 DIAGNOSIS — J30.1 SEASONAL ALLERGIC RHINITIS DUE TO POLLEN: ICD-10-CM

## 2022-04-05 DIAGNOSIS — K29.00 ACUTE SUPERFICIAL GASTRITIS WITHOUT HEMORRHAGE: ICD-10-CM

## 2022-04-05 DIAGNOSIS — E78.01 FAMILIAL HYPERCHOLESTEROLEMIA: Primary | ICD-10-CM

## 2022-04-05 DIAGNOSIS — U07.1 COVID: ICD-10-CM

## 2022-04-05 DIAGNOSIS — I10 ESSENTIAL HYPERTENSION: ICD-10-CM

## 2022-04-05 DIAGNOSIS — R73.9 HYPERGLYCEMIA: ICD-10-CM

## 2022-04-05 PROCEDURE — 3008F BODY MASS INDEX DOCD: CPT | Performed by: INTERNAL MEDICINE

## 2022-04-05 PROCEDURE — 1036F TOBACCO NON-USER: CPT | Performed by: INTERNAL MEDICINE

## 2022-04-05 PROCEDURE — 99214 OFFICE O/P EST MOD 30 MIN: CPT | Performed by: INTERNAL MEDICINE

## 2022-04-05 RX ORDER — ATORVASTATIN CALCIUM 10 MG/1
10 TABLET, FILM COATED ORAL DAILY
Qty: 90 TABLET | Refills: 1 | Status: SHIPPED | OUTPATIENT
Start: 2022-04-05

## 2022-04-05 RX ORDER — VALSARTAN 160 MG/1
160 TABLET ORAL DAILY
Qty: 90 TABLET | Refills: 1 | Status: SHIPPED | OUTPATIENT
Start: 2022-04-05

## 2022-04-05 NOTE — ASSESSMENT & PLAN NOTE
Patient in January this year did have positive COVID virus  Patient states that she has fully recovered with no sequela  Patient is up-to-date with all vaccines and booster testing  Would consider a 2nd booster at this point time because of her agent underlying medical problems  She states she will consider this

## 2022-04-05 NOTE — ASSESSMENT & PLAN NOTE
Patient does have a history a, patient remains on Lipitor 10 the daily  Patient states again noted she has been working hard on her diet exercise program trying to reduce her intake fats and cholesterol with her diet  Check a lipid profile with her next visit

## 2022-04-05 NOTE — PROGRESS NOTES
Assessment/Plan:    Hyperglycemia  Patient had a fasting blood sugar hemoglobin A1c performed prior to visit today  Patient is a diabetic suspect  Strong family history of diabetes  Patient states that at this point time she is working extremely hard on her diet and weight loss has been somewhat successful with this  She is watching her intake of concentrated sweets and simple carbohydrates and exercising on a daily basis  We will continue to monitor this initiate treatment in the future if necessary  Hyperlipidemia  Patient does have a history a, patient remains on Lipitor 10 the daily  Patient states again noted she has been working hard on her diet exercise program trying to reduce her intake fats and cholesterol with her diet  Check a lipid profile with her next visit  Hypertension  Longstanding history hypertension  Valsartan adequate control with present treatment  Renal function is stable  Patient will continue present medication and surveillance  COVID  Patient in January this year did have positive COVID virus  Patient states that she has fully recovered with no sequela  Patient is up-to-date with all vaccines and booster testing  Would consider a 2nd booster at this point time because of her agent underlying medical problems  She states she will consider this  Gastritis, acute  History of acute gastritis in the past   Patient states that she has no GI upset or difficulties at point time  Was told if any recurrence of symptoms to please call and further evaluation will take place at that time  Seasonal allergic rhinitis due to pollen  Patient does have a history of seasonal allergic rhinitis  She takes Flonase nasal spray and over-the-counter antihistamine  She is unsure the specific antihistamine that she is taking at this point time and she was told to call the office with an update as to the medication    She will continue the same and call if any breakthrough symptoms with her allergic rhinitis or worsening symptoms       Diagnoses and all orders for this visit:    Familial hypercholesterolemia  -     atorvastatin (LIPITOR) 10 mg tablet; Take 1 tablet (10 mg total) by mouth daily  -     Lipid panel; Future    Essential hypertension  -     valsartan (DIOVAN) 160 mg tablet; Take 1 tablet (160 mg total) by mouth daily  -     Basic metabolic panel; Future    Encounter for screening mammogram for breast cancer  -     Mammo screening bilateral w cad; Future    Hyperglycemia  -     Hemoglobin A1C; Future    COVID    Acute superficial gastritis without hemorrhage    Seasonal allergic rhinitis due to pollen          Subjective:      Patient ID: Angela Calderon is a 61 y o  female  Patient is a 79-year-old female history of medical problems as outlined previously who is here today for follow-up  She did have labs performed prior to the visit today and we did discuss the results including abnormal   Patient states in general she is doing relatively well  She is very proud of the fact that she has been working on her diet and exercise program and has been successful in losing weight  He denies any new medical problems or concerns  Does have a history of allergic rhinitis no severe symptoms at this point time  The following portions of the patient's history were reviewed and updated as appropriate:   She  has a past medical history of Hyperlipemia and Hypertension    She   Patient Active Problem List    Diagnosis Date Noted    Seasonal allergic rhinitis due to pollen 04/05/2022    Digital mucous cyst of finger of left hand 03/17/2022    COVID 01/04/2022    Exposure to COVID-19 virus 10/08/2021    Hyperglycemia 10/04/2021    Hives 07/09/2021    Bilateral thumb pain 06/16/2020    Lateral epicondylitis of both elbows 06/16/2020    Cough 01/02/2020    Left chest pressure 09/09/2019    Acute recurrent frontal sinusitis 05/06/2019    Localized, primary osteoarthritis of hand 2018    Hyperplastic polyp of ascending colon 2018    Healthcare maintenance 2018    Gastritis, acute 2017    Hyperlipidemia 2013    Hypertension 2012     She  has a past surgical history that includes Hysteroscopy w/ endometrial ablation;  section; and Colonoscopy (2020)  Her family history includes Colon cancer (age of onset: 48) in her cousin; Diabetes in her father and mother; Heart disease in her father; Lung cancer (age of onset: 48) in her cousin; No Known Problems in her son and son; Ovarian cancer (age of onset: 61) in her paternal aunt; Pancreatic cancer (age of onset: 66) in her paternal aunt  She  reports that she has never smoked  She has never used smokeless tobacco  She reports current alcohol use  She reports that she does not use drugs  Current Outpatient Medications   Medication Sig Dispense Refill    atorvastatin (LIPITOR) 10 mg tablet Take 1 tablet (10 mg total) by mouth daily 90 tablet 1    valsartan (DIOVAN) 160 mg tablet Take 1 tablet (160 mg total) by mouth daily 90 tablet 1    atorvastatin (LIPITOR) 10 mg tablet Take 1 tablet (10 mg total) by mouth daily 90 tablet 3     No current facility-administered medications for this visit  Current Outpatient Medications on File Prior to Visit   Medication Sig    [DISCONTINUED] atorvastatin (LIPITOR) 10 mg tablet Take 10 mg by mouth daily    [DISCONTINUED] valsartan (DIOVAN) 160 mg tablet TAKE 1 TABLET BY MOUTH EVERY DAY    atorvastatin (LIPITOR) 10 mg tablet Take 1 tablet (10 mg total) by mouth daily     No current facility-administered medications on file prior to visit  She is allergic to grass extracts [gramineae pollens], mold extract [trichophyton], and other       Review of Systems   Constitutional: Negative  HENT: Negative  Eyes: Negative  Respiratory: Negative  Cardiovascular: Negative  Gastrointestinal: Negative  Endocrine: Negative      Genitourinary: Negative  Musculoskeletal: Negative  Skin: Negative  Allergic/Immunologic: Negative  Neurological: Negative  Hematological: Negative  Psychiatric/Behavioral: Negative  Objective:      /82   Pulse 68   Temp (!) 97 4 °F (36 3 °C)   Ht 5' 5 5" (1 664 m)   Wt 65 3 kg (144 lb)   SpO2 97%   BMI 23 60 kg/m²          Physical Exam  Vitals and nursing note reviewed  Constitutional:       General: She is not in acute distress  Appearance: Normal appearance  She is normal weight  She is not ill-appearing, toxic-appearing or diaphoretic  Comments: Pleasant, articulate 82-year-old female who is awake alert no acute distress and oriented x3   HENT:      Head: Normocephalic and atraumatic  Right Ear: Tympanic membrane, ear canal and external ear normal  There is no impacted cerumen  Left Ear: Tympanic membrane, ear canal and external ear normal  There is no impacted cerumen  Nose: Nose normal  No congestion or rhinorrhea  Mouth/Throat:      Mouth: Mucous membranes are moist       Pharynx: Oropharynx is clear  No oropharyngeal exudate or posterior oropharyngeal erythema  Eyes:      General: No scleral icterus  Right eye: No discharge  Left eye: No discharge  Extraocular Movements: Extraocular movements intact  Conjunctiva/sclera: Conjunctivae normal       Pupils: Pupils are equal, round, and reactive to light  Neck:      Vascular: No carotid bruit  Cardiovascular:      Rate and Rhythm: Normal rate and regular rhythm  Pulses: Normal pulses  Heart sounds: Normal heart sounds  No murmur heard  No friction rub  No gallop  Pulmonary:      Effort: Pulmonary effort is normal  No respiratory distress  Breath sounds: Normal breath sounds  No stridor  No wheezing, rhonchi or rales  Chest:      Chest wall: No tenderness  Abdominal:      General: Abdomen is flat  Bowel sounds are normal  There is no distension  Palpations: Abdomen is soft  There is no mass  Tenderness: There is no abdominal tenderness  There is no right CVA tenderness, left CVA tenderness, guarding or rebound  Hernia: No hernia is present  Musculoskeletal:         General: No swelling, tenderness, deformity or signs of injury  Normal range of motion  Cervical back: Normal range of motion and neck supple  No rigidity or tenderness  Right lower leg: No edema  Left lower leg: No edema  Lymphadenopathy:      Cervical: No cervical adenopathy  Skin:     General: Skin is warm and dry  Capillary Refill: Capillary refill takes less than 2 seconds  Coloration: Skin is not jaundiced or pale  Findings: No bruising, erythema, lesion or rash  Neurological:      General: No focal deficit present  Mental Status: She is alert and oriented to person, place, and time  Mental status is at baseline  Cranial Nerves: No cranial nerve deficit  Sensory: No sensory deficit  Motor: No weakness  Coordination: Coordination normal       Gait: Gait normal       Deep Tendon Reflexes: Reflexes normal    Psychiatric:         Mood and Affect: Mood normal          Behavior: Behavior normal          Thought Content:  Thought content normal          Judgment: Judgment normal

## 2022-04-05 NOTE — ASSESSMENT & PLAN NOTE
History of acute gastritis in the past   Patient states that she has no GI upset or difficulties at point time  Was told if any recurrence of symptoms to please call and further evaluation will take place at that time

## 2022-04-05 NOTE — ASSESSMENT & PLAN NOTE
Patient does have a history of seasonal allergic rhinitis  She takes Flonase nasal spray and over-the-counter antihistamine  She is unsure the specific antihistamine that she is taking at this point time and she was told to call the office with an update as to the medication    She will continue the same and call if any breakthrough symptoms with her allergic rhinitis or worsening symptoms

## 2022-04-05 NOTE — ASSESSMENT & PLAN NOTE
Patient had a fasting blood sugar hemoglobin A1c performed prior to visit today  Patient is a diabetic suspect  Strong family history of diabetes  Patient states that at this point time she is working extremely hard on her diet and weight loss has been somewhat successful with this  She is watching her intake of concentrated sweets and simple carbohydrates and exercising on a daily basis  We will continue to monitor this initiate treatment in the future if necessary

## 2022-04-05 NOTE — ASSESSMENT & PLAN NOTE
Longstanding history hypertension  Valsartan adequate control with present treatment  Renal function is stable  Patient will continue present medication and surveillance

## 2022-09-27 ENCOUNTER — VBI (OUTPATIENT)
Dept: ADMINISTRATIVE | Facility: OTHER | Age: 67
End: 2022-09-27

## 2022-10-01 DIAGNOSIS — I10 ESSENTIAL HYPERTENSION: ICD-10-CM

## 2022-10-01 RX ORDER — VALSARTAN 160 MG/1
TABLET ORAL
Qty: 90 TABLET | Refills: 1 | Status: SHIPPED | OUTPATIENT
Start: 2022-10-01

## 2022-11-08 ENCOUNTER — HOSPITAL ENCOUNTER (OUTPATIENT)
Dept: RADIOLOGY | Age: 67
Discharge: HOME | End: 2022-11-08

## 2022-11-08 VITALS — BODY MASS INDEX: 23.32 KG/M2 | HEIGHT: 65 IN | WEIGHT: 140 LBS

## 2022-11-08 DIAGNOSIS — Z12.31 ENCOUNTER FOR SCREENING MAMMOGRAM FOR BREAST CANCER: ICD-10-CM

## 2022-11-08 DIAGNOSIS — Z12.31 ENCOUNTER FOR SCREENING MAMMOGRAM FOR MALIGNANT NEOPLASM OF BREAST: ICD-10-CM

## 2022-12-20 ENCOUNTER — APPOINTMENT (OUTPATIENT)
Dept: LAB | Facility: CLINIC | Age: 67
End: 2022-12-20

## 2022-12-20 DIAGNOSIS — E78.01 FAMILIAL HYPERCHOLESTEROLEMIA: ICD-10-CM

## 2022-12-20 DIAGNOSIS — R73.9 HYPERGLYCEMIA: ICD-10-CM

## 2022-12-20 DIAGNOSIS — I10 ESSENTIAL HYPERTENSION: ICD-10-CM

## 2022-12-20 LAB
ANION GAP SERPL CALCULATED.3IONS-SCNC: 5 MMOL/L (ref 4–13)
BUN SERPL-MCNC: 17 MG/DL (ref 5–25)
CALCIUM SERPL-MCNC: 9.2 MG/DL (ref 8.4–10.2)
CHLORIDE SERPL-SCNC: 106 MMOL/L (ref 96–108)
CHOLEST SERPL-MCNC: 153 MG/DL
CO2 SERPL-SCNC: 29 MMOL/L (ref 21–32)
CREAT SERPL-MCNC: 0.84 MG/DL (ref 0.6–1.3)
EST. AVERAGE GLUCOSE BLD GHB EST-MCNC: 111 MG/DL
GFR SERPL CREATININE-BSD FRML MDRD: 73 ML/MIN/1.73SQ M
GLUCOSE P FAST SERPL-MCNC: 97 MG/DL (ref 65–99)
HBA1C MFR BLD: 5.5 %
HDLC SERPL-MCNC: 53 MG/DL
LDLC SERPL CALC-MCNC: 79 MG/DL (ref 0–100)
NONHDLC SERPL-MCNC: 100 MG/DL
POTASSIUM SERPL-SCNC: 4.2 MMOL/L (ref 3.5–5.3)
SODIUM SERPL-SCNC: 140 MMOL/L (ref 135–147)
TRIGL SERPL-MCNC: 104 MG/DL

## 2022-12-21 DIAGNOSIS — E78.01 FAMILIAL HYPERCHOLESTEROLEMIA: ICD-10-CM

## 2022-12-21 RX ORDER — ATORVASTATIN CALCIUM 10 MG/1
TABLET, FILM COATED ORAL
Qty: 90 TABLET | Refills: 1 | Status: SHIPPED | OUTPATIENT
Start: 2022-12-21

## 2023-03-20 ENCOUNTER — TELEPHONE (OUTPATIENT)
Dept: INTERNAL MEDICINE CLINIC | Facility: CLINIC | Age: 68
End: 2023-03-20

## 2023-03-20 DIAGNOSIS — Z00.00 MEDICARE ANNUAL WELLNESS VISIT, INITIAL: ICD-10-CM

## 2023-03-20 DIAGNOSIS — R73.9 HYPERGLYCEMIA: Primary | ICD-10-CM

## 2023-03-20 DIAGNOSIS — E78.01 FAMILIAL HYPERCHOLESTEROLEMIA: ICD-10-CM

## 2023-03-20 DIAGNOSIS — I10 PRIMARY HYPERTENSION: ICD-10-CM

## 2023-03-20 NOTE — TELEPHONE ENCOUNTER
Patient would like to know if she needs any blood work done for her upcoming visit  Patient can be reached at [phone]

## 2023-03-29 ENCOUNTER — APPOINTMENT (OUTPATIENT)
Dept: LAB | Facility: CLINIC | Age: 68
End: 2023-03-29

## 2023-03-29 DIAGNOSIS — Z00.00 MEDICARE ANNUAL WELLNESS VISIT, INITIAL: ICD-10-CM

## 2023-03-29 DIAGNOSIS — R73.9 HYPERGLYCEMIA: ICD-10-CM

## 2023-03-29 DIAGNOSIS — I10 PRIMARY HYPERTENSION: ICD-10-CM

## 2023-03-29 LAB
ALBUMIN SERPL BCP-MCNC: 4.2 G/DL (ref 3.5–5)
ALP SERPL-CCNC: 44 U/L (ref 34–104)
ALT SERPL W P-5'-P-CCNC: 23 U/L (ref 7–52)
ANION GAP SERPL CALCULATED.3IONS-SCNC: 3 MMOL/L (ref 4–13)
AST SERPL W P-5'-P-CCNC: 18 U/L (ref 13–39)
BASOPHILS # BLD AUTO: 0.04 THOUSANDS/ÂΜL (ref 0–0.1)
BASOPHILS NFR BLD AUTO: 1 % (ref 0–1)
BILIRUB SERPL-MCNC: 0.76 MG/DL (ref 0.2–1)
BILIRUB UR QL STRIP: NEGATIVE
BUN SERPL-MCNC: 20 MG/DL (ref 5–25)
CALCIUM SERPL-MCNC: 9.6 MG/DL (ref 8.4–10.2)
CHLORIDE SERPL-SCNC: 106 MMOL/L (ref 96–108)
CHOLEST SERPL-MCNC: 174 MG/DL
CLARITY UR: CLEAR
CO2 SERPL-SCNC: 31 MMOL/L (ref 21–32)
COLOR UR: NORMAL
CREAT SERPL-MCNC: 0.89 MG/DL (ref 0.6–1.3)
EOSINOPHIL # BLD AUTO: 0.18 THOUSAND/ÂΜL (ref 0–0.61)
EOSINOPHIL NFR BLD AUTO: 3 % (ref 0–6)
ERYTHROCYTE [DISTWIDTH] IN BLOOD BY AUTOMATED COUNT: 11.8 % (ref 11.6–15.1)
GFR SERPL CREATININE-BSD FRML MDRD: 68 ML/MIN/1.73SQ M
GLUCOSE P FAST SERPL-MCNC: 105 MG/DL (ref 65–99)
GLUCOSE UR STRIP-MCNC: NEGATIVE MG/DL
HCT VFR BLD AUTO: 43.9 % (ref 34.8–46.1)
HDLC SERPL-MCNC: 53 MG/DL
HEMOCCULT STL QL IA: NEGATIVE
HGB BLD-MCNC: 14.4 G/DL (ref 11.5–15.4)
HGB UR QL STRIP.AUTO: NEGATIVE
IMM GRANULOCYTES # BLD AUTO: 0.01 THOUSAND/UL (ref 0–0.2)
IMM GRANULOCYTES NFR BLD AUTO: 0 % (ref 0–2)
KETONES UR STRIP-MCNC: NEGATIVE MG/DL
LDLC SERPL CALC-MCNC: 95 MG/DL (ref 0–100)
LEUKOCYTE ESTERASE UR QL STRIP: NEGATIVE
LYMPHOCYTES # BLD AUTO: 2.38 THOUSANDS/ÂΜL (ref 0.6–4.47)
LYMPHOCYTES NFR BLD AUTO: 43 % (ref 14–44)
MCH RBC QN AUTO: 30.8 PG (ref 26.8–34.3)
MCHC RBC AUTO-ENTMCNC: 32.8 G/DL (ref 31.4–37.4)
MCV RBC AUTO: 94 FL (ref 82–98)
MONOCYTES # BLD AUTO: 0.42 THOUSAND/ÂΜL (ref 0.17–1.22)
MONOCYTES NFR BLD AUTO: 8 % (ref 4–12)
NEUTROPHILS # BLD AUTO: 2.49 THOUSANDS/ÂΜL (ref 1.85–7.62)
NEUTS SEG NFR BLD AUTO: 45 % (ref 43–75)
NITRITE UR QL STRIP: NEGATIVE
NONHDLC SERPL-MCNC: 121 MG/DL
NRBC BLD AUTO-RTO: 0 /100 WBCS
PH UR STRIP.AUTO: 5.5 [PH]
PLATELET # BLD AUTO: 295 THOUSANDS/UL (ref 149–390)
PMV BLD AUTO: 9.2 FL (ref 8.9–12.7)
POTASSIUM SERPL-SCNC: 4.4 MMOL/L (ref 3.5–5.3)
PROT SERPL-MCNC: 6.7 G/DL (ref 6.4–8.4)
PROT UR STRIP-MCNC: NEGATIVE MG/DL
RBC # BLD AUTO: 4.68 MILLION/UL (ref 3.81–5.12)
SODIUM SERPL-SCNC: 140 MMOL/L (ref 135–147)
SP GR UR STRIP.AUTO: 1.02 (ref 1–1.03)
TRIGL SERPL-MCNC: 128 MG/DL
UROBILINOGEN UR STRIP-ACNC: <2 MG/DL
WBC # BLD AUTO: 5.52 THOUSAND/UL (ref 4.31–10.16)

## 2023-04-01 LAB
EST. AVERAGE GLUCOSE BLD GHB EST-MCNC: 117 MG/DL
HBA1C MFR BLD: 5.7 %

## 2023-04-06 ENCOUNTER — RA CDI HCC (OUTPATIENT)
Dept: OTHER | Facility: HOSPITAL | Age: 68
End: 2023-04-06

## 2023-04-06 NOTE — PROGRESS NOTES
nAt [address]  coding opportunities       Chart reviewed, no opportunity found: CHART REVIEWED, NO OPPORTUNITY FOUND        Patients Insurance     Medicare Insurance: Medicare

## 2023-04-07 DIAGNOSIS — I10 ESSENTIAL HYPERTENSION: ICD-10-CM

## 2023-04-07 RX ORDER — VALSARTAN 160 MG/1
TABLET ORAL
Qty: 90 TABLET | Refills: 1 | Status: SHIPPED | OUTPATIENT
Start: 2023-04-07

## 2023-05-19 PROBLEM — Z00.00 MEDICARE ANNUAL WELLNESS VISIT, INITIAL: Status: RESOLVED | Noted: 2018-02-28 | Resolved: 2023-05-19

## 2023-06-05 ENCOUNTER — ANNUAL EXAM (OUTPATIENT)
Dept: OBGYN CLINIC | Facility: CLINIC | Age: 68
End: 2023-06-05
Payer: COMMERCIAL

## 2023-06-05 VITALS
DIASTOLIC BLOOD PRESSURE: 70 MMHG | SYSTOLIC BLOOD PRESSURE: 124 MMHG | BODY MASS INDEX: 23.16 KG/M2 | HEIGHT: 65 IN | WEIGHT: 139 LBS

## 2023-06-05 DIAGNOSIS — N81.4 UTERINE PROLAPSE: ICD-10-CM

## 2023-06-05 DIAGNOSIS — Z12.31 ENCOUNTER FOR SCREENING MAMMOGRAM FOR MALIGNANT NEOPLASM OF BREAST: ICD-10-CM

## 2023-06-05 DIAGNOSIS — Z78.0 ASYMPTOMATIC MENOPAUSAL STATE: ICD-10-CM

## 2023-06-05 DIAGNOSIS — Z01.419 WOMEN'S ANNUAL ROUTINE GYNECOLOGICAL EXAMINATION: Primary | ICD-10-CM

## 2023-06-05 PROCEDURE — G0101 CA SCREEN;PELVIC/BREAST EXAM: HCPCS | Performed by: OBSTETRICS & GYNECOLOGY

## 2023-06-05 NOTE — PROGRESS NOTES
ASSESSMENT & PLAN:   Diagnoses and all orders for this visit:    Women's annual routine gynecological examination    Encounter for screening mammogram for malignant neoplasm of breast  -     Mammo screening bilateral w 3d & cad; Future    Asymptomatic menopausal state  -     DXA bone density spine hip and pelvis; Future    Uterine prolapse  -     Ambulatory Referral to Urogynecology; Future          The following were reviewed in today's visit: ASCCP guidelines (Pap screen not indicated after age 72), STD testing breast self exam, mammography screening ordered, menopause, exercise and healthy diet  Patient to return to office in yearly for annual exam      All questions have been answered to her satisfaction  CC:  Annual Gynecologic Examination  Chief Complaint   Patient presents with   • Gynecologic Exam     Pt is here for her annual exam; pap is up to date  She is having some pelvic pain she wants to discuss; possible prolapse  Last pap 21 wnl, hpv-   Last mammo 24  Last colonoscopy 20, repeat 5 years        HPI: Any Diggs is a 74 y o  [id number] who presents for annual gynecologic examination  She has the following concerns:  She is having an ache in her pelvis, left side  No bleeding, no discharge  Health Maintenance:    Exercise: walks daily  Breast exams/breast awareness: yes  Diet: well balanced diet  Last mammogram:   Colorectal cancer screenin  DEXA: ordered today     Past Medical History:   Diagnosis Date   • Hyperlipemia    • Hypertension        Past Surgical History:   Procedure Laterality Date   •  SECTION     • COLONOSCOPY  2020    Dr Tammy Aly , 5 yr f/u    • HYSTEROSCOPY W/ ENDOMETRIAL ABLATION         Past OB/Gyn History:   No LMP recorded   Patient is postmenopausal     Patient is menopausal    Menopausal symptoms: None  Last Pap:  : no abnormalities; further pap screening not indicated  History of abnormal Pap smear: no    Patient is currently sexually active but not often  Family History  Family History   Problem Relation Age of Onset   • Ovarian cancer Paternal Aunt 61   • Pancreatic cancer Paternal Aunt 66   • Colon cancer Cousin 48        mets to lung   • Lung cancer Cousin 48        mets from colon cancer   • Diabetes Mother    • Heart disease Father    • Diabetes Father    • No Known Problems Son    • No Known Problems Son        Family history of uterine or ovarian cancer: yes  Family history of breast cancer: no  Family history of colon cancer: yes    Social History:  Social History     Socioeconomic History   • Marital status: /Civil Union     Spouse name: Not on file   • Number of children: Not on file   • Years of education: Not on file   • Highest education level: Not on file   Occupational History   • Not on file   Tobacco Use   • Smoking status: Never   • Smokeless tobacco: Never   Vaping Use   • Vaping Use: Never used   Substance and Sexual Activity   • Alcohol use: Yes     Comment: social   • Drug use: Never   • Sexual activity: Yes     Partners: Male     Birth control/protection: Post-menopausal   Other Topics Concern   • Not on file   Social History Narrative   • Not on file     Social Determinants of Health     Financial Resource Strain: Not on file   Food Insecurity: Not on file   Transportation Needs: Not on file   Physical Activity: Not on file   Stress: Not on file   Social Connections: Not on file   Intimate Partner Violence: Not on file   Housing Stability: Not on file     Domestic violence screen: negative    Allergies:   Allergies   Allergen Reactions   • Grass Extracts [Gramineae Pollens]    • Mold Extract [Trichophyton]    • Other      Trees  [place] trees       Medications:    Current Outpatient Medications:   •  atorvastatin (LIPITOR) 10 mg tablet, TAKE 1 TABLET BY MOUTH EVERY DAY, Disp: 90 tablet, Rfl: 1  •  valsartan (DIOVAN) 160 mg tablet, TAKE 1 TABLET BY MOUTH EVERY DAY, Disp: 90 tablet, Rfl: "1    Review of Systems:  Review of Systems   Constitutional: Negative for chills and fever  Respiratory: Negative for cough and shortness of breath  Cardiovascular: Negative for chest pain and palpitations  Gastrointestinal: Negative for abdominal distention, abdominal pain, blood in stool, constipation, nausea and vomiting  Genitourinary: Positive for frequency and pelvic pain  Negative for difficulty urinating, dyspareunia, dysuria, urgency, vaginal bleeding, vaginal discharge and vaginal pain  Neurological: Negative for headaches  Physical Exam:  /70   Ht 5' 5\" (1 651 m)   Wt 63 kg (139 lb)   BMI 23 13 kg/m²    Physical Exam  Constitutional:       General: She is awake  Appearance: Normal appearance  She is well-developed  Genitourinary:      Vulva, bladder and urethral meatus normal       Right Labia: No rash, tenderness or lesions  Left Labia: No tenderness, lesions or rash  No labial fusion noted  No vaginal discharge, erythema, tenderness or bleeding  Apical vaginal prolapse present  Mild vaginal atrophy present  Right Adnexa: not tender, not full and no mass present  Left Adnexa: not tender, not full and no mass present  Cervix is parous  No cervical motion tenderness, discharge, lesion or polyp  Uterus is prolapsed  Uterus is not enlarged, tender or irregular  No uterine mass detected  No urethral prolapse present  Bladder is not tender  Pelvic exam was performed with patient in the lithotomy position  Breasts:     Right: No inverted nipple, mass, nipple discharge, skin change or tenderness  Left: No inverted nipple, mass, nipple discharge, skin change or tenderness  HENT:      Head: Normocephalic and atraumatic  Cardiovascular:      Rate and Rhythm: Normal rate and regular rhythm  Heart sounds: Normal heart sounds     Pulmonary:      Effort: Pulmonary effort is normal  No tachypnea or " respiratory distress  Breath sounds: Normal breath sounds  Abdominal:      General: Abdomen is flat  There is no distension  Palpations: Abdomen is soft  Tenderness: There is no abdominal tenderness  There is no guarding or rebound  Musculoskeletal:      Cervical back: Neck supple  Lymphadenopathy:      Upper Body:      Right upper body: No supraclavicular or axillary adenopathy  Left upper body: No supraclavicular or axillary adenopathy  Neurological:      General: No focal deficit present  Mental Status: She is alert and oriented to person, place, and time  Psychiatric:         Mood and Affect: Mood normal          Behavior: Behavior normal          Thought Content: Thought content normal          Judgment: Judgment normal    Vitals reviewed

## 2023-06-18 DIAGNOSIS — E78.01 FAMILIAL HYPERCHOLESTEROLEMIA: ICD-10-CM

## 2023-06-18 RX ORDER — ATORVASTATIN CALCIUM 10 MG/1
TABLET, FILM COATED ORAL
Qty: 90 TABLET | Refills: 1 | Status: SHIPPED | OUTPATIENT
Start: 2023-06-18

## 2023-07-25 ENCOUNTER — HOSPITAL ENCOUNTER (OUTPATIENT)
Dept: BONE DENSITY | Facility: MEDICAL CENTER | Age: 68
Discharge: HOME | End: 2023-07-25
Payer: COMMERCIAL

## 2023-07-25 DIAGNOSIS — Z78.0 ASYMPTOMATIC MENOPAUSAL STATE: ICD-10-CM

## 2023-07-25 PROCEDURE — 77080 DXA BONE DENSITY AXIAL: CPT

## 2023-08-01 NOTE — RESULT ENCOUNTER NOTE
Hi Sagrario    Your DEXA scan shows osteopenia, or low bone mass. The 10 year risk of hip fracture is below the threshold for treatment. See below for some preventative measures against worsening bone density. Please contact the office with any questions. Low bone mass (osteopenia) - Low bone mass (osteopenia) is the term health care providers use to describe bone density that is lower than normal but that has not yet reached the low levels seen with osteoporosis. A person with osteopenia does not yet have osteoporosis but is at risk of developing it. People with osteopenia have a T-score between -1.1 and -2.4. OSTEOPOROSIS PREVENTION  Some of the most important treatments for preventing osteoporosis include diet, exercise, and not smoking. Diet - An optimal diet for preventing or treating osteoporosis includes consuming an adequate number of protein and calories as well as optimal amounts of calcium and vitamin D, which are essential in helping to maintain proper bone formation and density. Calcium intake - Experts recommend that premenopausal women and men consume at least 1000 mg of calcium per day; this includes calcium in foods and beverages plus calcium supplements. Postmenopausal women should consume 1200 mg of calcium per day (total of diet plus supplements). However, you should not take more than 2000 mg calcium per day, due to the possibility of side effects. The main dietary sources of calcium include milk and other dairy products, such as cottage cheese, yogurt, or hard cheese, and green vegetables, such as kale and broccoli. A rough method of estimating dietary calcium intake is to multiply the number of dairy servings consumed each day by 300 mg. One serving is 8 oz of milk (236 mL) or yogurt (224 g), 1 oz (28 g) of hard cheese, or 16 oz (448 g) of cottage cheese.     Calcium supplements (calcium carbonate or calcium citrate) may be suggested for women who cannot get enough calcium in their diet. Supplemental calcium doses greater than 500 mg/day should be taken in divided doses (eg, once in morning and evening). Vitamin D intake - Experts recommend that postmenopausal women consume 800 international units of vitamin D each day. This dose appears to reduce bone loss and fracture rate in older women and men who have adequate calcium intake (described above). Although the optimal intake has not been clearly established in premenopausal women or in younger men with osteoporosis, 600 international units of vitamin D daily is generally suggested. Milk supplemented with vitamin D is a primary dietary source of dietary vitamin D; it contains approximately 100 international units per 8 oz (236 mL). Another good source is salmon, with approximately 600 international units per 3.5 oz (98 g) serving. Experts recommend vitamin D supplementation for all patients with osteoporosis whose intake of vitamin D is below 600 to 800 international units per day. Protein supplements - Protein supplements may be recommended in some people to ensure sufficient protein intake. This may be particularly important for those who have already had an osteoporotic fracture. Alcohol, caffeine, and salt intake - Drinking alcohol excessively (more than two drinks a day) can increase the risk of fracture due to an increased risk of falling, poor nutrition, etc, so it should be avoided. Restricting caffeine or salt has not been proven to prevent bone loss in people who consume an adequate amount of calcium. Exercise - Exercise may decrease fracture risk by improving bone mass in premenopausal women and helping to maintain bone density for women after menopause. Furthermore, exercise may decrease the tendency to fall due to weakness. Physical activity reduces the risk of hip fracture in older women as a result of increased muscle strength.  Most experts recommend exercising for at least 30 minutes three times per week.    The benefits of exercise are quickly lost when a person stops exercising. A regular, weightbearing exercise regimen that a person enjoys improves the chances that the person will continue it over the long term. Smoking - Stopping smoking is strongly recommended for bone health because smoking cigarettes is known to speed bone loss. One study suggested that women who smoke one pack per day throughout adulthood have a 5 to 10 percent reduction in bone density by menopause, resulting in an increased risk of fracture.

## 2023-08-10 ENCOUNTER — RA CDI HCC (OUTPATIENT)
Dept: OTHER | Facility: HOSPITAL | Age: 68
End: 2023-08-10

## 2023-08-10 NOTE — PROGRESS NOTES
[address] [facility] coding opportunities       Chart reviewed, no opportunity found: CHART REVIEWED, NO OPPORTUNITY FOUND        Patients Insurance     Medicare Insurance: [employer]

## 2023-08-11 ENCOUNTER — APPOINTMENT (OUTPATIENT)
Dept: LAB | Facility: CLINIC | Age: 68
End: 2023-08-11
Payer: COMMERCIAL

## 2023-08-11 DIAGNOSIS — I10 PRIMARY HYPERTENSION: ICD-10-CM

## 2023-08-11 DIAGNOSIS — R73.9 HYPERGLYCEMIA: ICD-10-CM

## 2023-08-11 LAB
ANION GAP SERPL CALCULATED.3IONS-SCNC: 4 MMOL/L
BUN SERPL-MCNC: 18 MG/DL (ref 5–25)
CALCIUM SERPL-MCNC: 9.2 MG/DL (ref 8.4–10.2)
CHLORIDE SERPL-SCNC: 106 MMOL/L (ref 96–108)
CO2 SERPL-SCNC: 30 MMOL/L (ref 21–32)
CREAT SERPL-MCNC: 0.86 MG/DL (ref 0.6–1.3)
GFR SERPL CREATININE-BSD FRML MDRD: 71 ML/MIN/1.73SQ M
GLUCOSE P FAST SERPL-MCNC: 98 MG/DL (ref 65–99)
POTASSIUM SERPL-SCNC: 4.2 MMOL/L (ref 3.5–5.3)
SODIUM SERPL-SCNC: 140 MMOL/L (ref 135–147)

## 2023-08-11 PROCEDURE — 83036 HEMOGLOBIN GLYCOSYLATED A1C: CPT

## 2023-08-11 PROCEDURE — 36415 COLL VENOUS BLD VENIPUNCTURE: CPT

## 2023-08-11 PROCEDURE — 80048 BASIC METABOLIC PNL TOTAL CA: CPT

## 2023-08-12 LAB
EST. AVERAGE GLUCOSE BLD GHB EST-MCNC: 126 MG/DL
HBA1C MFR BLD: 6 %

## 2023-08-17 ENCOUNTER — OFFICE VISIT (OUTPATIENT)
Dept: INTERNAL MEDICINE CLINIC | Facility: CLINIC | Age: 68
End: 2023-08-17
Payer: COMMERCIAL

## 2023-08-17 VITALS
HEIGHT: 65 IN | TEMPERATURE: 97.7 F | BODY MASS INDEX: 23.66 KG/M2 | HEART RATE: 71 BPM | WEIGHT: 142 LBS | SYSTOLIC BLOOD PRESSURE: 118 MMHG | OXYGEN SATURATION: 99 % | DIASTOLIC BLOOD PRESSURE: 70 MMHG

## 2023-08-17 DIAGNOSIS — I10 PRIMARY HYPERTENSION: ICD-10-CM

## 2023-08-17 DIAGNOSIS — R73.9 HYPERGLYCEMIA: ICD-10-CM

## 2023-08-17 DIAGNOSIS — Z00.00 MEDICARE ANNUAL WELLNESS VISIT, INITIAL: Primary | ICD-10-CM

## 2023-08-17 DIAGNOSIS — E78.01 FAMILIAL HYPERCHOLESTEROLEMIA: ICD-10-CM

## 2023-08-17 PROCEDURE — 99214 OFFICE O/P EST MOD 30 MIN: CPT | Performed by: INTERNAL MEDICINE

## 2023-08-17 PROCEDURE — G0438 PPPS, INITIAL VISIT: HCPCS | Performed by: INTERNAL MEDICINE

## 2023-08-17 NOTE — PROGRESS NOTES
Name: Barbara Fine      : 1955      MRN: [id number]  Encounter Provider: Sharry Boas, DO  Encounter Date: 2023   Encounter department: [facility]    Assessment & Plan     1. Medicare annual wellness visit, initial  Assessment & Plan:  Patient is a 29-year-old female history of medical problems outlined previously who is here today for her first Medicare wellness visit. She did have extensive lab testing performed approximately 4 months ago and we did discuss the results. She did have labs performed prior to the visit today. Patient states in general she has been doing relatively well with some problems with irritation to her left eye and she just recently saw ophthalmology, uterine prolapse and seeing a specialist in the near future. Did undergo physical exam today in the office with no acute abnormalities. Patient will continue present medication and surveillance and we will check a lipid profile hemoglobin R4V basic metabolic profile and fasting sugar with her next visit      2. Familial hypercholesterolemia  Assessment & Plan:  History of hyperlipidemia. Patient remains on Lipitor 10 mg daily. Her cholesterol has been well controlled. Patient admits she is not always perfect with her diet and we did remind her again the importance of watching her intake of fats and cholesterol with her diet. Check another lipid profile in 4 months    Orders:  -     Lipid panel; Future    3. Primary hypertension  Assessment & Plan:  Longstanding history of hypertension. Blood pressure is showing excellent control with present treatment. Patient will continue present medication and surveillance. Check on blood pressure and renal function with next visit    Orders:  -     Basic metabolic panel; Future    4. Hyperglycemia  Assessment & Plan:  History of hyperglycemia and strong family history of diabetes mellitus type 2.   Normal blood sugars hemoglobin A1c has slowly gone up.  She states she is watching her diet and trying to eliminate concentrated sweets simple carbohydrates, weight has been stable. Check this again in approximately 4 months and initiate treatment in the future if needed. Orders:  -     Hemoglobin A1C; Future           Subjective     Patient is a 59-year-old female history of medical problems outlined previously who is here today for her initial Medicare wellness visit. She did have labs performed prior to the visit and we did discuss the results. Patient has complaints of some minor eye irritation of the left which was evaluated by ophthalmology, diagnosis of uterine prolapse to see a specialist in the near future. Patient is excited leaving next week for extended vacation in [address] [place] [address] [place]   Constitutional: Negative. HENT: Negative. Eyes: Positive for itching. Negative for photophobia, pain, discharge, redness and visual disturbance. Respiratory: Negative. Cardiovascular: Negative. Gastrointestinal: Negative. Endocrine: Negative. Genitourinary: Negative. History of uterine prolapse to see specialist in the near future   Musculoskeletal: Negative. Skin: Negative. Allergic/Immunologic: Negative. Neurological: Negative. Hematological: Negative. Psychiatric/Behavioral: Negative.         Past Medical History:   Diagnosis Date   • Hyperlipemia    • Hypertension      Past Surgical History:   Procedure Laterality Date   •  SECTION     • COLONOSCOPY  2020    Dr Jaylin Summers , 5 yr f/u    • HYSTEROSCOPY W/ ENDOMETRIAL ABLATION       Family History   Problem Relation Age of Onset   • Ovarian cancer Paternal Aunt 61   • Pancreatic cancer Paternal Aunt 71   • Colon cancer Cousin 48        mets to lung   • Lung cancer Cousin 48        mets from colon cancer   • Diabetes Mother    • Heart disease Father    • Diabetes Father    • No Known Problems Son    • No Known Problems Son      Social History Socioeconomic History   • Marital status: /Civil Union     Spouse name: None   • Number of children: None   • Years of education: None   • Highest education level: None   Occupational History   • None   Tobacco Use   • Smoking status: Never   • Smokeless tobacco: Never   Vaping Use   • Vaping Use: Never used   Substance and Sexual Activity   • Alcohol use: Yes     Comment: social   • Drug use: Never   • Sexual activity: Yes     Partners: Male     Birth control/protection: Post-menopausal   Other Topics Concern   • None   Social History Narrative   • None     Social Determinants of Health     Financial Resource Strain: Low Risk  (8/17/2023)    Overall Financial Resource Strain (CARDIA)    • Difficulty of Paying Living Expenses: Not hard at all   Food Insecurity: Not on file   Transportation Needs: No Transportation Needs (8/17/2023)    PRAPARE - Transportation    • Lack of Transportation (Medical): No    • Lack of Transportation (Non-Medical):  No   Physical Activity: Not on file   Stress: Not on file   Social Connections: Not on file   Intimate Partner Violence: Not on file   Housing Stability: Not on file     Current Outpatient Medications on File Prior to Visit   Medication Sig   • atorvastatin (LIPITOR) 10 mg tablet TAKE 1 TABLET BY MOUTH EVERY DAY   • valsartan (DIOVAN) 160 mg tablet TAKE 1 TABLET BY MOUTH EVERY DAY     Allergies   Allergen Reactions   • Grass Extracts [Gramineae Pollens]    • Mold Extract [Trichophyton]    • Other      Trees  [place] trees     Immunization History   Administered Date(s) Administered   • COVID-19 PFIZER VACCINE 0.3 ML IM 03/20/2021, 04/10/2021   • INFLUENZA 10/19/2016   • Influenza Quadrivalent Preservative Free 3 years and older IM 11/05/2014   • Influenza Quadrivalent, 6-35 Months IM 11/07/2017   • Influenza, recombinant, quadrivalent,injectable, preservative free 09/12/2018, 10/21/2019, 09/29/2020, 09/24/2021   • Influenza, seasonal, injectable 10/23/2013   • Tdap 11/07/2017       Objective     /70   Pulse 71   Temp 97.7 °F (36.5 °C)   Ht 5' 5" (1.651 m)   Wt 64.4 kg (142 lb)   SpO2 99%   BMI 23.63 kg/m²     Physical Exam  Vitals and nursing note reviewed. Constitutional:       General: She is not in acute distress. Appearance: Normal appearance. She is normal weight. She is not ill-appearing, toxic-appearing or diaphoretic. Comments: Pleasant, articulate 77-year-old female who is awake alert in no acute distress and oriented x3   HENT:      Head: Normocephalic and atraumatic. Right Ear: Tympanic membrane, ear canal and external ear normal. There is no impacted cerumen. Left Ear: Tympanic membrane, ear canal and external ear normal. There is no impacted cerumen. Nose: Nose normal. No congestion or rhinorrhea. Mouth/Throat:      Mouth: Mucous membranes are moist.      Pharynx: Oropharynx is clear. No oropharyngeal exudate or posterior oropharyngeal erythema. Eyes:      General: No scleral icterus. Right eye: No discharge. Left eye: No discharge. Extraocular Movements: Extraocular movements intact. Conjunctiva/sclera: Conjunctivae normal.      Pupils: Pupils are equal, round, and reactive to light. Neck:      Vascular: No carotid bruit. Cardiovascular:      Rate and Rhythm: Normal rate and regular rhythm. Pulses: Normal pulses. Heart sounds: Normal heart sounds. No murmur heard. No friction rub. No gallop. Pulmonary:      Effort: Pulmonary effort is normal. No respiratory distress. Breath sounds: Normal breath sounds. No stridor. No wheezing, rhonchi or rales. Chest:      Chest wall: No tenderness. Abdominal:      General: Abdomen is flat. Bowel sounds are normal. There is no distension. Palpations: Abdomen is soft. There is no mass. Tenderness: There is no abdominal tenderness. There is no right CVA tenderness, left CVA tenderness, guarding or rebound.       Hernia: No hernia is present. Musculoskeletal:         General: No swelling, tenderness, deformity or signs of injury. Normal range of motion. Cervical back: Normal range of motion and neck supple. No rigidity or tenderness. Right lower leg: No edema. Left lower leg: No edema. Lymphadenopathy:      Cervical: No cervical adenopathy. Skin:     General: Skin is warm and dry. Capillary Refill: Capillary refill takes less than 2 seconds. Coloration: Skin is not jaundiced or pale. Findings: No bruising, erythema, lesion or rash. Neurological:      General: No focal deficit present. Mental Status: She is alert and oriented to person, place, and time. Mental status is at baseline. Cranial Nerves: No cranial nerve deficit. Sensory: No sensory deficit. Motor: No weakness. Coordination: Coordination normal.      Gait: Gait normal.      Deep Tendon Reflexes: Reflexes normal.   Psychiatric:         Mood and Affect: Mood normal.         Behavior: Behavior normal.         Thought Content:  Thought content normal.         Judgment: Judgment normal.       Fabby Warner DO  Answers for HPI/ROS submitted by the patient on 8/11/2023  How often during the last year have you found that you were not able to stop drinking once you had started?: 0 - never  How often during the last year have you failed to do what was normally expected from you because of drinking?: 0 - never  How often during the last year have you needed a first drink in the morning to get yourself going after a heavy drinking session?: 0 - never  How often during the last year have you had a feeling of guilt or remorse after drinking?: 0 - never  How often during the last year have you been unable to remember what happened the night before because you had been drinking?: 0 - never  Have you or someone else been injured as a result of your drinking?: 0 - no  Has a relative or friend or a doctor or another health worker been concerned about your drinking or suggested you cut down?: 0 - no  How would you rate your overall health?: excellent  Compared to last year, how is your physical health?: same  In general, how satisfied are you with your life?: satisfied  Compared to last year, how is your eyesight?: slightly worse  Compared to last year, how is your hearing?: same  Compared to last year, how is your emotional/mental health?: same  How often is anger a problem for you?: never, rarely  How often do you feel unusually tired/fatigued?: sometimes  In the past 7 days, how much pain have you experienced?: none  In the past 6 months, have you lost or gained 10 pounds without trying?: No  One or more falls in the last year: No  In the past 6 months, have you accidentally leaked urine?: Yes  Do you have trouble with the stairs inside or outside your home?: No  Does your home have working smoke alarms?: Yes  Does your home have a carbon monoxide monitor?: Yes  Which safety hazards (if any) have you experienced in your home? Please select all that apply.: none  How would you describe your current diet?  Please select all that apply.: Regular  In addition to prescription medications, are you taking any over-the-counter supplements?: Yes  If yes, what supplements are you taking?: CoQ10 200 mg, Calcium  1000 & D3, Multivitamin  Can you manage your medications?: Yes  Are you currently taking any opioid medications?: No  Can you walk and transfer into and out of your bed and chair?: Yes  Can you dress and groom yourself?: Yes  Can you bathe or shower yourself?: Yes  Can you feed yourself?: Yes  Can you do your laundry/ housekeeping?: Yes  Can you manage your money, pay your bills, and track your expenses?: Yes  Can you make your own meals?: Yes  Can you do your own shopping?: Yes  Within the last 12 months, have you had any hospitalizations or Emergency Department visits?: No  Do you have a living will?: Yes  Do you have a Durable POA (Power of ) for healthcare decisions?: No  Do you have an Advanced Directive for end of life decisions?: No  How often have you used an illegal drug (including marijuana) or a prescription medication for non-medical reasons in the past year?: never  What is the typical number of drinks you consume in a day?: 0  What is the typical number of drinks you consume in a week?: 2  How often did you have a drink containing alcohol in the past year?: 2 to 3 times a week  How many drinks did you have on a typical day  when you were drinking in the past year?: 1 to 2  How often did you have 6 or more drinks on one occasion in the past year?: never

## 2023-08-17 NOTE — ASSESSMENT & PLAN NOTE
Patient is a 44-year-old female history of medical problems outlined previously who is here today for her first Medicare wellness visit. She did have extensive lab testing performed approximately 4 months ago and we did discuss the results. She did have labs performed prior to the visit today. Patient states in general she has been doing relatively well with some problems with irritation to her left eye and she just recently saw ophthalmology, uterine prolapse and seeing a specialist in the near future. Did undergo physical exam today in the office with no acute abnormalities.   Patient will continue present medication and surveillance and we will check a lipid profile hemoglobin C0L basic metabolic profile and fasting sugar with her next visit

## 2023-08-17 NOTE — ASSESSMENT & PLAN NOTE
Longstanding history of hypertension. Blood pressure is showing excellent control with present treatment. Patient will continue present medication and surveillance.   Check on blood pressure and renal function with next visit

## 2023-08-17 NOTE — PROGRESS NOTES
Regular  In addition to prescription medications, are you taking any over-the-counter supplements?: Yes  If yes, what supplements are you taking?: CoQ10 200 mg, Calcium  1000 & D3, Multivitamin  Can you manage your medications?: Yes  Are you currently taking any opioid medications?: No  Can you walk and transfer into and out of your bed and chair?: Yes  Can you dress and groom yourself?: Yes  Can you bathe or shower yourself?: Yes  Can you feed yourself?: Yes  Can you do your laundry/ housekeeping?: Yes  Can you manage your money, pay your bills, and track your expenses?: Yes  Can you make your own meals?: Yes  Can you do your own shopping?: Yes  Within the last 12 months, have you had any hospitalizations or Emergency Department visits?: No  Do you have a living will?: Yes  Do you have a Durable POA (Power of ) for healthcare decisions?: No  Do you have an Advanced Directive for end of life decisions?: No  How often have you used an illegal drug (including marijuana) or a prescription medication for non-medical reasons in the past year?: never  What is the typical number of drinks you consume in a day?: 0  What is the typical number of drinks you consume in a week?: 2  How often did you have a drink containing alcohol in the past year?: 2 to 3 times a week  How many drinks did you have on a typical day  when you were drinking in the past year?: 1 to 2  How often did you have 6 or more drinks on one occasion in the past year?: never

## 2023-08-17 NOTE — PROGRESS NOTES
Assessment and Plan:     Problem List Items Addressed This Visit    None       Preventive health issues were discussed with patient, and age appropriate screening tests were ordered as noted in patient's After Visit Summary. Personalized health advice and appropriate referrals for health education or preventive services given if needed, as noted in patient's After Visit Summary.      History of Present Illness:     Patient presents for a Medicare Wellness Visit    HPI   Patient Care Team:  Henry Joiner DO as PCP - General  Henry Joiner DO as PCP - PCP-[facility] Derrick Christian DO     Review of Systems:     Review of Systems     Problem List:     Patient Active Problem List   Diagnosis   • Hypertension   • Hyperlipidemia   • Hyperplastic polyp of ascending colon   • Gastritis, acute   • Localized, primary osteoarthritis of hand   • Acute recurrent frontal sinusitis   • Left chest pressure   • Cough   • Bilateral thumb pain   • Lateral epicondylitis of both elbows   • Hives   • Hyperglycemia   • Exposure to COVID-19 virus   • COVID   • Digital mucous cyst of finger of left hand   • Seasonal allergic rhinitis due to pollen      Past Medical and Surgical History:     Past Medical History:   Diagnosis Date   • Hyperlipemia    • Hypertension      Past Surgical History:   Procedure Laterality Date   •  SECTION     • COLONOSCOPY  2020    Dr Derian Odonnell , 5 yr f/u    • HYSTEROSCOPY W/ ENDOMETRIAL ABLATION        Family History:     Family History   Problem Relation Age of Onset   • Ovarian cancer Paternal Aunt 61   • Pancreatic cancer Paternal Aunt 66   • Colon cancer Cousin 48        mets to lung   • Lung cancer Cousin 48        mets from colon cancer   • Diabetes Mother    • Heart disease Father    • Diabetes Father    • No Known Problems Son    • No Known Problems Son       Social History:     Social History     Socioeconomic History   • Marital status: /Civil Union     Spouse name: Not on file   • Number of children: Not on file   • Years of education: Not on file   • Highest education level: Not on file   Occupational History   • Not on file   Tobacco Use   • Smoking status: Never   • Smokeless tobacco: Never   Vaping Use   • Vaping Use: Never used   Substance and Sexual Activity   • Alcohol use: Yes     Comment: social   • Drug use: Never   • Sexual activity: Yes     Partners: Male     Birth control/protection: Post-menopausal   Other Topics Concern   • Not on file   Social History Narrative   • Not on file     Social Determinants of Health     Financial Resource Strain: Low Risk  (8/11/2023)    Overall Financial Resource Strain (CARDIA)    • Difficulty of Paying Living Expenses: Not hard at all   Food Insecurity: Not on file   Transportation Needs: No Transportation Needs (8/11/2023)    PRAPARE - Transportation    • Lack of Transportation (Medical): No    • Lack of Transportation (Non-Medical): No   Physical Activity: Not on file   Stress: Not on file   Social Connections: Not on file   Intimate Partner Violence: Not on file   Housing Stability: Not on file      Medications and Allergies:     Current Outpatient Medications   Medication Sig Dispense Refill   • atorvastatin (LIPITOR) 10 mg tablet TAKE 1 TABLET BY MOUTH EVERY DAY 90 tablet 1   • valsartan (DIOVAN) 160 mg tablet TAKE 1 TABLET BY MOUTH EVERY DAY 90 tablet 1     No current facility-administered medications for this visit.      Allergies   Allergen Reactions   • Grass Extracts [Gramineae Pollens]    • Mold Extract [Trichophyton]    • Other      Trees  [place] trees      Immunizations:     Immunization History   Administered Date(s) Administered   • COVID-19 PFIZER VACCINE 0.3 ML IM 03/20/2021, 04/10/2021   • INFLUENZA 10/19/2016   • Influenza Quadrivalent Preservative Free 3 years and older IM 11/05/2014   • Influenza Quadrivalent, 6-35 Months IM 11/07/2017   • Influenza, recombinant, quadrivalent,injectable, preservative free 09/12/2018, 10/21/2019, 09/29/2020, 09/24/2021   • Influenza, seasonal, injectable 10/23/2013   • Tdap 11/07/2017      Health Maintenance:         Topic Date Due   • Breast Cancer Screening: Mammogram  11/08/2023   • Cervical Cancer Screening  01/12/2024   • Colorectal Cancer Screening  07/20/2025   • HIV Screening  Completed   • Hepatitis C Screening  Completed         Topic Date Due   • COVID-19 Vaccine (3 - Pfizer series) 06/05/2021   • Pneumococcal Vaccine: 65+ Years (1 - PCV) Never done   • Influenza Vaccine (1) 09/01/2023      Medicare Screening Tests and Risk Assessments:     Margie Galicia is here for her Subsequent Wellness visit. Health Risk Assessment:   Patient rates overall health as excellent. Patient feels that their physical health rating is same. Patient is satisfied with their life. Eyesight was rated as slightly worse. Hearing was rated as same. Patient feels that their emotional and mental health rating is same. Patients states they are never, rarely angry. Patient states they are sometimes unusually tired/fatigued. Pain experienced in the last 7 days has been none. Patient states that she has experienced no weight loss or gain in last 6 months. Fall Risk Screening: In the past year, patient has experienced: no history of falling in past year      Urinary Incontinence Screening:   Patient has leaked urine accidently in the last six months. Home Safety:  Patient does not have trouble with stairs inside or outside of their home. Patient has working smoke alarms and has working carbon monoxide detector. Home safety hazards include: none. Nutrition:   Current diet is Regular. Medications:   Patient is currently taking over-the-counter supplements. OTC medications include: CoQ10 200 mg, Calcium  1000 & D3, Multivitamin. Patient is able to manage medications.      Activities of Daily Living (ADLs)/Instrumental Activities of Daily Living (IADLs):   Walk and transfer into and out of bed and chair?: Yes  Dress and groom yourself?: Yes    Bathe or shower yourself?: Yes    Feed yourself? Yes  Do your laundry/housekeeping?: Yes  Manage your money, pay your bills and track your expenses?: Yes  Make your own meals?: Yes    Do your own shopping?: Yes    Previous Hospitalizations:   Any hospitalizations or ED visits within the last 12 months?: No      Advance Care Planning:   Living will: Yes    Durable POA for healthcare: No    Advanced directive: No      PREVENTIVE SCREENINGS      Cardiovascular Screening:    General: Screening Not Indicated and History Lipid Disorder      Diabetes Screening:     General: Screening Current      Colorectal Cancer Screening:     General: Screening Current      Breast Cancer Screening:     General: Screening Current      Cervical Cancer Screening:    General: Screening Not Indicated      Lung Cancer Screening:     General: Screening Not Indicated      Hepatitis C Screening:    General: Screening Current    Screening, Brief Intervention, and Referral to Treatment (SBIRT)    Screening  Typical number of drinks in a day: 0  Typical number of drinks in a week: 2  Interpretation: Low risk drinking behavior. AUDIT-C Screenin) How often did you have a drink containing alcohol in the past year? 2 to 3 times a week  2) How many drinks did you have on a typical day when you were drinking in the past year? 1 to 2  3) How often did you have 6 or more drinks on one occasion in the past year? never    AUDIT-C Score: 3  Interpretation: Score 3-12 (female): POSITIVE screen for alcohol misuse    AUDIT Screenin) How often during the last year have you found that you were not able to stop drinking once you had started?  0 - never  5) How often during the last year have you failed to do what was normally expected from you because of drinking? 0 - never  6) How often during the last year have you needed a first drink in the morning to get yourself going after a heavy drinking session? 0 - never  7) How often during the last year have you had a feeling of guilt or remorse after drinking? 0 - never  8) How often during the last year have you been unable to remember what happened the night before because you had been drinking? 0 - never  9) Have you or someone else been injured as a result of your drinking? 0 - no  10) Has a relative or friend or a doctor or another health worker been concerned about your drinking or suggested you cut down? 0 - no    AUDIT Score: 3  Interpretation: Low risk alcohol consumption    Single Item Drug Screening:  How often have you used an illegal drug (including marijuana) or a prescription medication for non-medical reasons in the past year? never    Single Item Drug Screen Score: 0  Interpretation: Negative screen for possible drug use disorder    No results found. Physical Exam:     There were no vitals taken for this visit.     Physical Exam     Zulema Chapman DO

## 2023-08-17 NOTE — ASSESSMENT & PLAN NOTE
History of hyperglycemia and strong family history of diabetes mellitus type 2. Normal blood sugars hemoglobin A1c has slowly gone up. She states she is watching her diet and trying to eliminate concentrated sweets simple carbohydrates, weight has been stable. Check this again in approximately 4 months and initiate treatment in the future if needed.

## 2023-08-17 NOTE — ASSESSMENT & PLAN NOTE
History of hyperlipidemia. Patient remains on Lipitor 10 mg daily. Her cholesterol has been well controlled. Patient admits she is not always perfect with her diet and we did remind her again the importance of watching her intake of fats and cholesterol with her diet.   Check another lipid profile in 4 months

## 2023-10-15 DIAGNOSIS — I10 ESSENTIAL HYPERTENSION: ICD-10-CM

## 2023-10-15 RX ORDER — VALSARTAN 160 MG/1
TABLET ORAL
Qty: 90 TABLET | Refills: 1 | Status: SHIPPED | OUTPATIENT
Start: 2023-10-15

## 2023-10-16 PROBLEM — Z00.00 MEDICARE ANNUAL WELLNESS VISIT, INITIAL: Status: RESOLVED | Noted: 2018-02-28 | Resolved: 2023-10-16

## 2023-11-21 ENCOUNTER — OFFICE VISIT (OUTPATIENT)
Dept: INTERNAL MEDICINE CLINIC | Facility: CLINIC | Age: 68
End: 2023-11-21
Payer: COMMERCIAL

## 2023-11-21 VITALS
DIASTOLIC BLOOD PRESSURE: 76 MMHG | HEART RATE: 85 BPM | WEIGHT: 149 LBS | SYSTOLIC BLOOD PRESSURE: 124 MMHG | HEIGHT: 65 IN | TEMPERATURE: 98.2 F | BODY MASS INDEX: 24.83 KG/M2 | OXYGEN SATURATION: 97 %

## 2023-11-21 DIAGNOSIS — J01.11 ACUTE RECURRENT FRONTAL SINUSITIS: Primary | ICD-10-CM

## 2023-11-21 DIAGNOSIS — I10 PRIMARY HYPERTENSION: ICD-10-CM

## 2023-11-21 PROCEDURE — 99213 OFFICE O/P EST LOW 20 MIN: CPT | Performed by: INTERNAL MEDICINE

## 2023-11-21 RX ORDER — AMOXICILLIN 500 MG/1
500 CAPSULE ORAL EVERY 8 HOURS SCHEDULED
Qty: 30 CAPSULE | Refills: 0 | Status: SHIPPED | OUTPATIENT
Start: 2023-11-21 | End: 2023-12-01

## 2023-11-21 NOTE — ASSESSMENT & PLAN NOTE
72-year-old female with a history of multiple medical problems outlined previously who is here today for acute evaluation. States over the past 3 to 4 days starting with some nasal congestion and sore throat this is now developed also to a cough bringing up from the nares with thick yellow mucus and beginning to cough up some phlegm. Denies any fever or chills. No shortness of breath. She is taking over-the-counter medications such as Mucinex DM and using Flonase nasal spray which is not helping. On evaluation patient does have severely inflamed nasal turbinates bilaterally more on the right than the left with a thick yellow discharge, erythema to posterior airway and tonsillar pillars with a thick yellow postnasal drip. Patient's lungs despite her feeling congested RR at this point in time clear to auscultation anteriorly and posteriorly. Patient has been diagnosed with an upper respiratory tract infection, sinusitis. Because the patient is going away to [facility] with her family we feels appropriate at this point to treat her aggressively and patient was placed on amoxicillin 500 mg 3 times a day for 10 days. She will continue with the Flonase and the Mucinex.   Call if not improving

## 2023-12-08 ENCOUNTER — APPOINTMENT (OUTPATIENT)
Dept: LAB | Facility: CLINIC | Age: 68
End: 2023-12-08
Payer: COMMERCIAL

## 2023-12-08 DIAGNOSIS — I10 PRIMARY HYPERTENSION: ICD-10-CM

## 2023-12-08 DIAGNOSIS — E78.01 FAMILIAL HYPERCHOLESTEROLEMIA: ICD-10-CM

## 2023-12-08 DIAGNOSIS — R73.9 HYPERGLYCEMIA: ICD-10-CM

## 2023-12-08 LAB
ANION GAP SERPL CALCULATED.3IONS-SCNC: 4 MMOL/L
BUN SERPL-MCNC: 16 MG/DL (ref 5–25)
CALCIUM SERPL-MCNC: 9.2 MG/DL (ref 8.4–10.2)
CHLORIDE SERPL-SCNC: 106 MMOL/L (ref 96–108)
CHOLEST SERPL-MCNC: 157 MG/DL
CO2 SERPL-SCNC: 30 MMOL/L (ref 21–32)
CREAT SERPL-MCNC: 0.88 MG/DL (ref 0.6–1.3)
EST. AVERAGE GLUCOSE BLD GHB EST-MCNC: 128 MG/DL
GFR SERPL CREATININE-BSD FRML MDRD: 68 ML/MIN/1.73SQ M
GLUCOSE P FAST SERPL-MCNC: 106 MG/DL (ref 65–99)
HBA1C MFR BLD: 6.1 %
HDLC SERPL-MCNC: 46 MG/DL
LDLC SERPL CALC-MCNC: 83 MG/DL (ref 0–100)
NONHDLC SERPL-MCNC: 111 MG/DL
POTASSIUM SERPL-SCNC: 4.5 MMOL/L (ref 3.5–5.3)
SODIUM SERPL-SCNC: 140 MMOL/L (ref 135–147)
TRIGL SERPL-MCNC: 140 MG/DL

## 2023-12-08 PROCEDURE — 83036 HEMOGLOBIN GLYCOSYLATED A1C: CPT

## 2023-12-08 PROCEDURE — 80048 BASIC METABOLIC PNL TOTAL CA: CPT

## 2023-12-08 PROCEDURE — 80061 LIPID PANEL: CPT

## 2023-12-08 PROCEDURE — 36415 COLL VENOUS BLD VENIPUNCTURE: CPT

## 2023-12-11 ENCOUNTER — RA CDI HCC (OUTPATIENT)
Dept: OTHER | Facility: HOSPITAL | Age: 68
End: 2023-12-11

## 2023-12-11 NOTE — PROGRESS NOTES
[address] [facility] coding opportunities       Chart reviewed, no opportunity found: CHART REVIEWED, NO OPPORTUNITY FOUND        Patients Insurance     Medicare Insurance: [employer]

## 2023-12-13 ENCOUNTER — OFFICE VISIT (OUTPATIENT)
Dept: INTERNAL MEDICINE CLINIC | Facility: CLINIC | Age: 68
End: 2023-12-13
Payer: COMMERCIAL

## 2023-12-13 VITALS
WEIGHT: 146 LBS | HEART RATE: 81 BPM | BODY MASS INDEX: 24.32 KG/M2 | TEMPERATURE: 97.6 F | OXYGEN SATURATION: 96 % | SYSTOLIC BLOOD PRESSURE: 128 MMHG | HEIGHT: 65 IN | DIASTOLIC BLOOD PRESSURE: 78 MMHG

## 2023-12-13 DIAGNOSIS — R73.9 HYPERGLYCEMIA: ICD-10-CM

## 2023-12-13 DIAGNOSIS — I10 PRIMARY HYPERTENSION: Primary | ICD-10-CM

## 2023-12-13 DIAGNOSIS — E78.01 FAMILIAL HYPERCHOLESTEROLEMIA: ICD-10-CM

## 2023-12-13 PROCEDURE — 99214 OFFICE O/P EST MOD 30 MIN: CPT | Performed by: INTERNAL MEDICINE

## 2023-12-13 NOTE — ASSESSMENT & PLAN NOTE
We continue to monitor the patient's blood sugar readings. Her strong family history of diabetes. Slight elevation in her fasting glucose hemoglobin A1c is in the prediabetic range. Again we have discussed the importance of diet looking at her intake of concentrated sweets, simple carbohydrates and calories overall staying physically active. Check a fasting blood sugar hemoglobin A1c with her next visit and initiate treatment with medication in the future if needed.

## 2023-12-13 NOTE — PROGRESS NOTES
Name: Valerie Bass      : 1955      MRN: [id number]  Encounter Provider: Jessee Mireles DO  Encounter Date: 2023   Encounter department: [facility] INTERNAL MEDICINE    Assessment & Plan     1. Primary hypertension  Assessment & Plan:  Patient has a longstanding history of hypertension. As noted blood pressure is showing excellent control and patient will continue present medication and surveillance. We will also continue to monitor renal function and this will be checked prior to her next visit    Orders:  -     Basic metabolic panel; Future    2. Hyperglycemia  Assessment & Plan: We continue to monitor the patient's blood sugar readings. Her strong family history of diabetes. Slight elevation in her fasting glucose hemoglobin A1c is in the prediabetic range. Again we have discussed the importance of diet looking at her intake of concentrated sweets, simple carbohydrates and calories overall staying physically active. Check a fasting blood sugar hemoglobin A1c with her next visit and initiate treatment with medication in the future if needed. Orders:  -     Hemoglobin A1C; Future    3. Familial hypercholesterolemia  Assessment & Plan:  History of hyperlipidemia. Patient remains on atorvastatin 10 mg daily. She is stable as far as her blood tests are concerned but her HDL has reduced somewhat since her last visit. We discussed with the patient increasing her Lipitor to 20 mg daily but the patient states she has preferred to work on her diet exclusively and we will check another lipid profile when she returns to the office in 4 months. Subjective     Patient is a 79-year-old female history of multiple medical problems outlined previously who is here today for routine follow-up. Patient states in general she has been doing relatively well recently with no new complaints or concerns. Looking forward to the holidays and spending time with her family.   Patient did have labs performed prior to the visit today and we did discuss results at length showing no acute abnormalities that need to be addressed at this point in time. Review of Systems   Constitutional: Negative. HENT: Negative. Eyes: Negative. Respiratory: Negative. Cardiovascular: Negative. Gastrointestinal: Negative. Endocrine: Negative. Genitourinary: Negative. Patient does have a history of uterine prolapse and is undergoing workup and evaluation for possible surgery hysterectomy in the future   Musculoskeletal: Negative. Skin: Negative. Allergic/Immunologic: Negative. Neurological: Negative. Hematological: Negative. Psychiatric/Behavioral: Negative.        Past Medical History:   Diagnosis Date   • Hyperlipemia    • Hypertension      Past Surgical History:   Procedure Laterality Date   •  SECTION     • COLONOSCOPY  2020    Dr Jacob Pizano , 5 yr f/u    • HYSTEROSCOPY W/ ENDOMETRIAL ABLATION       Family History   Problem Relation Age of Onset   • Ovarian cancer Paternal Aunt 61   • Pancreatic cancer Paternal Aunt 66   • Colon cancer Cousin 48        mets to lung   • Lung cancer Cousin 48        mets from colon cancer   • Diabetes Mother    • Heart disease Father    • Diabetes Father    • No Known Problems Son    • No Known Problems Son      Social History     Socioeconomic History   • Marital status: /Civil Union     Spouse name: None   • Number of children: None   • Years of education: None   • Highest education level: None   Occupational History   • None   Tobacco Use   • Smoking status: Never   • Smokeless tobacco: Never   Vaping Use   • Vaping status: Never Used   Substance and Sexual Activity   • Alcohol use: Yes     Comment: social   • Drug use: Never   • Sexual activity: Yes     Partners: Male     Birth control/protection: Post-menopausal   Other Topics Concern   • None   Social History Narrative   • None     Social Determinants of Health Financial Resource Strain: Low Risk  (8/17/2023)    Overall Financial Resource Strain (CARDIA)    • Difficulty of Paying Living Expenses: Not hard at all   Food Insecurity: Not on file   Transportation Needs: No Transportation Needs (8/17/2023)    PRAPARE - Transportation    • Lack of Transportation (Medical): No    • Lack of Transportation (Non-Medical): No   Physical Activity: Not on file   Stress: Not on file   Social Connections: Not on file   Intimate Partner Violence: Not on file   Housing Stability: Not on file     Current Outpatient Medications on File Prior to Visit   Medication Sig   • atorvastatin (LIPITOR) 10 mg tablet TAKE 1 TABLET BY MOUTH EVERY DAY   • valsartan (DIOVAN) 160 mg tablet TAKE 1 TABLET BY MOUTH EVERY DAY     Allergies   Allergen Reactions   • Grass Extracts [Gramineae Pollens]    • Mold Extract [Trichophyton]    • Other      Trees  [place] trees     Immunization History   Administered Date(s) Administered   • COVID-19 PFIZER VACCINE 0.3 ML IM 03/20/2021, 04/10/2021   • INFLUENZA 10/19/2016   • Influenza Quadrivalent Preservative Free 3 years and older IM 11/05/2014   • Influenza Quadrivalent, 6-35 Months IM 11/07/2017   • Influenza, recombinant, quadrivalent,injectable, preservative free 09/12/2018, 10/21/2019, 09/29/2020, 09/24/2021   • Influenza, seasonal, injectable 10/23/2013   • Tdap 11/07/2017       Objective     /78 (BP Location: Left arm, Patient Position: Sitting, Cuff Size: Standard)   Pulse 81   Temp 97.6 °F (36.4 °C)   Ht 5' 5" (1.651 m)   Wt 66.2 kg (146 lb)   SpO2 96%   BMI 24.30 kg/m²     Physical Exam  Vitals and nursing note reviewed. Constitutional:       General: She is not in acute distress. Appearance: Normal appearance. She is normal weight. She is not ill-appearing, toxic-appearing or diaphoretic.       Comments: Pleasant cheerful, articulate 19-year-old female who is awake alert in no acute distress and oriented x 3   HENT:      Head: Normocephalic and atraumatic. Right Ear: Tympanic membrane, ear canal and external ear normal. There is no impacted cerumen. Left Ear: Tympanic membrane, ear canal and external ear normal. There is no impacted cerumen. Nose: Nose normal. No congestion or rhinorrhea. Mouth/Throat:      Mouth: Mucous membranes are moist.      Pharynx: Oropharynx is clear. No oropharyngeal exudate or posterior oropharyngeal erythema. Eyes:      General: No scleral icterus. Right eye: No discharge. Left eye: No discharge. Extraocular Movements: Extraocular movements intact. Conjunctiva/sclera: Conjunctivae normal.      Pupils: Pupils are equal, round, and reactive to light. Neck:      Vascular: No carotid bruit. Cardiovascular:      Rate and Rhythm: Normal rate and regular rhythm. Pulses: Normal pulses. Heart sounds: Normal heart sounds. No murmur heard. No friction rub. No gallop. Pulmonary:      Effort: Pulmonary effort is normal. No respiratory distress. Breath sounds: Normal breath sounds. No stridor. No wheezing, rhonchi or rales. Chest:      Chest wall: No tenderness. Abdominal:      General: Abdomen is flat. Bowel sounds are normal. There is no distension. Palpations: Abdomen is soft. There is no mass. Tenderness: There is no abdominal tenderness. There is no right CVA tenderness, left CVA tenderness, guarding or rebound. Hernia: No hernia is present. Musculoskeletal:         General: No swelling, tenderness, deformity or signs of injury. Normal range of motion. Cervical back: Normal range of motion and neck supple. No rigidity or tenderness. Right lower leg: No edema. Left lower leg: No edema. Lymphadenopathy:      Cervical: No cervical adenopathy. Skin:     General: Skin is warm and dry. Capillary Refill: Capillary refill takes less than 2 seconds. Coloration: Skin is not jaundiced or pale.       Findings: No bruising, erythema, lesion or rash. Neurological:      General: No focal deficit present. Mental Status: She is alert and oriented to person, place, and time. Mental status is at baseline. Cranial Nerves: No cranial nerve deficit. Sensory: No sensory deficit. Motor: No weakness. Coordination: Coordination normal.      Gait: Gait normal.      Deep Tendon Reflexes: Reflexes normal.   Psychiatric:         Mood and Affect: Mood normal.         Behavior: Behavior normal.         Thought Content:  Thought content normal.         Judgment: Judgment normal.   Elder Carney, DO

## 2023-12-13 NOTE — ASSESSMENT & PLAN NOTE
History of hyperlipidemia. Patient remains on atorvastatin 10 mg daily. She is stable as far as her blood tests are concerned but her HDL has reduced somewhat since her last visit. We discussed with the patient increasing her Lipitor to 20 mg daily but the patient states she has preferred to work on her diet exclusively and we will check another lipid profile when she returns to the office in 4 months.

## 2023-12-13 NOTE — ASSESSMENT & PLAN NOTE
Patient has a longstanding history of hypertension. As noted blood pressure is showing excellent control and patient will continue present medication and surveillance.   We will also continue to monitor renal function and this will be checked prior to her next visit

## 2023-12-21 DIAGNOSIS — E78.01 FAMILIAL HYPERCHOLESTEROLEMIA: ICD-10-CM

## 2023-12-21 RX ORDER — ATORVASTATIN CALCIUM 10 MG/1
TABLET, FILM COATED ORAL
Qty: 90 TABLET | Refills: 1 | Status: SHIPPED | OUTPATIENT
Start: 2023-12-21

## 2024-01-02 ENCOUNTER — TELEPHONE (OUTPATIENT)
Dept: INTERNAL MEDICINE CLINIC | Facility: CLINIC | Age: 69
End: 2024-01-02

## 2024-01-02 NOTE — TELEPHONE ENCOUNTER
Hi ,   Patient called and asked if she needs a pre-op appointment because she was in on 12/13. She is having a hysterectomy of a uterine prolapse. I did put the patient on the schedule for a pre-op appointment just in case. Thank you.

## 2024-01-11 ENCOUNTER — RA CDI HCC (OUTPATIENT)
Dept: OTHER | Facility: HOSPITAL | Age: 69
End: 2024-01-11

## 2024-01-11 NOTE — PROGRESS NOTES
HCC coding opportunities       Chart reviewed, no opportunity found: CHART REVIEWED, NO OPPORTUNITY FOUND   This is a reminder to address (resolve/update/assess) ALL HCC (risk adjustment) codes as found on active problem list for 2024 as patient scores reset to zero [place].  Also, just a reminder to please review and assess all other chronic conditions for 2024     Patients Insurance        Commercial Insurance: [employer] Commercial Insurance

## 2024-02-02 ENCOUNTER — APPOINTMENT (OUTPATIENT)
Dept: LAB | Facility: CLINIC | Age: 69
End: 2024-02-02
Payer: COMMERCIAL

## 2024-02-02 DIAGNOSIS — R39.15 URGENCY OF URINATION: ICD-10-CM

## 2024-02-02 DIAGNOSIS — Z01.812 PRE-OPERATIVE LABORATORY EXAMINATION: ICD-10-CM

## 2024-02-02 DIAGNOSIS — I10 PRIMARY HYPERTENSION: ICD-10-CM

## 2024-02-02 DIAGNOSIS — R73.9 HYPERGLYCEMIA: ICD-10-CM

## 2024-02-02 LAB
ANION GAP SERPL CALCULATED.3IONS-SCNC: 4 MMOL/L
ATRIAL RATE: 65 BPM
BUN SERPL-MCNC: 19 MG/DL (ref 5–25)
CALCIUM SERPL-MCNC: 9.4 MG/DL (ref 8.4–10.2)
CHLORIDE SERPL-SCNC: 105 MMOL/L (ref 96–108)
CO2 SERPL-SCNC: 31 MMOL/L (ref 21–32)
CREAT SERPL-MCNC: 0.9 MG/DL (ref 0.6–1.3)
ERYTHROCYTE [DISTWIDTH] IN BLOOD BY AUTOMATED COUNT: 11.8 % (ref 11.6–15.1)
GFR SERPL CREATININE-BSD FRML MDRD: 66 ML/MIN/1.73SQ M
GLUCOSE P FAST SERPL-MCNC: 98 MG/DL (ref 65–99)
HCT VFR BLD AUTO: 42 % (ref 34.8–46.1)
HGB BLD-MCNC: 13.9 G/DL (ref 11.5–15.4)
MCH RBC QN AUTO: 30.5 PG (ref 26.8–34.3)
MCHC RBC AUTO-ENTMCNC: 33.1 G/DL (ref 31.4–37.4)
MCV RBC AUTO: 92 FL (ref 82–98)
P AXIS: 45 DEGREES
PLATELET # BLD AUTO: 297 THOUSANDS/UL (ref 149–390)
PMV BLD AUTO: 9.6 FL (ref 8.9–12.7)
POTASSIUM SERPL-SCNC: 4.1 MMOL/L (ref 3.5–5.3)
PR INTERVAL: 140 MS
QRS AXIS: 42 DEGREES
QRSD INTERVAL: 86 MS
QT INTERVAL: 398 MS
QTC INTERVAL: 413 MS
RBC # BLD AUTO: 4.55 MILLION/UL (ref 3.81–5.12)
SODIUM SERPL-SCNC: 140 MMOL/L (ref 135–147)
T WAVE AXIS: 91 DEGREES
VENTRICULAR RATE: 65 BPM
WBC # BLD AUTO: 5.16 THOUSAND/UL (ref 4.31–10.16)

## 2024-02-02 PROCEDURE — 83036 HEMOGLOBIN GLYCOSYLATED A1C: CPT

## 2024-02-02 PROCEDURE — 80048 BASIC METABOLIC PNL TOTAL CA: CPT

## 2024-02-02 PROCEDURE — 85027 COMPLETE CBC AUTOMATED: CPT

## 2024-02-02 PROCEDURE — 36415 COLL VENOUS BLD VENIPUNCTURE: CPT

## 2024-02-03 LAB
EST. AVERAGE GLUCOSE BLD GHB EST-MCNC: 128 MG/DL
HBA1C MFR BLD: 6.1 %

## 2024-02-19 ENCOUNTER — CONSULT (OUTPATIENT)
Dept: INTERNAL MEDICINE CLINIC | Facility: CLINIC | Age: 69
End: 2024-02-19
Payer: COMMERCIAL

## 2024-02-19 VITALS
HEART RATE: 75 BPM | DIASTOLIC BLOOD PRESSURE: 88 MMHG | WEIGHT: 150 LBS | BODY MASS INDEX: 24.99 KG/M2 | HEIGHT: 65 IN | TEMPERATURE: 97.2 F | OXYGEN SATURATION: 99 % | RESPIRATION RATE: 16 BRPM | SYSTOLIC BLOOD PRESSURE: 138 MMHG

## 2024-02-19 DIAGNOSIS — R73.03 PREDIABETES: ICD-10-CM

## 2024-02-19 DIAGNOSIS — Z01.818 PREOPERATIVE CLEARANCE: Primary | ICD-10-CM

## 2024-02-19 DIAGNOSIS — N81.4 UTERINE PROLAPSE: ICD-10-CM

## 2024-02-19 DIAGNOSIS — E78.01 FAMILIAL HYPERCHOLESTEROLEMIA: ICD-10-CM

## 2024-02-19 DIAGNOSIS — I10 PRIMARY HYPERTENSION: ICD-10-CM

## 2024-02-19 PROCEDURE — 99214 OFFICE O/P EST MOD 30 MIN: CPT | Performed by: INTERNAL MEDICINE

## 2024-02-19 RX ORDER — ACETAMINOPHEN 160 MG
TABLET,DISINTEGRATING ORAL
COMMUNITY

## 2024-02-19 RX ORDER — CEPHALEXIN 250 MG
CAPSULE ORAL
COMMUNITY

## 2024-02-19 RX ORDER — MELATONIN/PYRIDOXINE HCL (B6) 5 MG-10 MG
200 TABLET,IMMED, EXTENDED RELEASE, BIPHASIC ORAL
COMMUNITY

## 2024-02-19 NOTE — PROGRESS NOTES
Assessment/Plan:    Problem List Items Addressed This Visit     Hypertension     -elevated today  -monitor home bp   -hold valsartan 24h prior to surgery         Hyperlipidemia     -continue atorvastatin periop         Prediabetes     -A1c 6.1  -adhere to low glycemic diet  -recommend repeat A1c in 6-12months        Other Visit Diagnoses     Preoperative clearance    -  Primary    -she is low risk of developing a major adverse clinical event for moderate risk urogynecological surgery    Uterine prolapse        -for LTH with BSO, lap colpopexy and pubovaginal sing on 3/1/24 with Dr. Rutherford          Subjective:      Patient ID: Sagrario Potts is a 68 y.o. female.    HPI    Sagrario Potts is a 68 y.o. female who presents to the office today for a preoperative consultation at the request of surgeon Dr. Mac Rutherford who plans on performing LTH with BSO, lap colpopexy, pubovaginal sling on March 1.  Type of anesthesia to be used general.  Prior anesthesia adverse reactions no.    She has been monitored for uterine prolapse and at her last GYN exam she was referred to Urogynecology.  Reports urinary frequency and  now has incomplete emptying of the bladder.  Denies hematuria.  She denies frequent UTIs.    She does not monitor her home bp.    Exercise capacity - Able to walk 4 blocks or climb 2 flights of stairs without symptoms - yes    Easy bleeding/bruising - no    Chest pain - no    Dyspnea, wheezing, cough - no    Sleep apnea - no    Lifestyle:   reports current alcohol use.   reports that she has never smoked. She has never used smokeless tobacco.   reports no history of drug use.    The following portions of the patient's history were reviewed and updated as appropriate: allergies, current medications, past family history, past medical history, past social history, past surgical history and problem list.      Current Outpatient Medications:   •  atorvastatin (LIPITOR) 10 mg tablet, TAKE 1 TABLET BY MOUTH  "EVERY DAY, Disp: 90 tablet, Rfl: 1  •  Calcium 200 MG TABS, Calcium, Disp: , Rfl:   •  Cholecalciferol (Vitamin D3) 50 MCG (2000 UT) capsule, Vitamin D3, Disp: , Rfl:   •  Coenzyme Q10 (CoQ-10) 100 MG capsule, CoQ-10, Disp: , Rfl:   •  Multiple Vitamins-Minerals (Multivitamin & Mineral) LIQD, Multivitamin, Disp: , Rfl:   •  valsartan (DIOVAN) 160 mg tablet, TAKE 1 TABLET BY MOUTH EVERY DAY, Disp: 90 tablet, Rfl: 1    Review of Systems   Constitutional:  Negative for activity change, appetite change and unexpected weight change.   HENT:  Negative for postnasal drip.    Respiratory:  Negative for cough, shortness of breath and wheezing.    Cardiovascular:  Negative for chest pain and leg swelling.   Gastrointestinal:  Negative for abdominal pain, constipation and diarrhea.        Acid reflux   Genitourinary:  Positive for frequency. Negative for hematuria.   Neurological:  Negative for dizziness and headaches.         Objective:    /88 (BP Location: Left arm, Patient Position: Sitting, Cuff Size: Standard)   Pulse 75   Temp (!) 97.2 °F (36.2 °C)   Resp 16   Ht 5' 5\" (1.651 m)   Wt 68 kg (150 lb)   SpO2 99%   BMI 24.96 kg/m²      Physical Exam  Vitals reviewed.   HENT:      Head: Normocephalic.      Right Ear: Tympanic membrane and ear canal normal.      Left Ear: Tympanic membrane and ear canal normal.      Nose: Nose normal.      Mouth/Throat:      Mouth: Mucous membranes are moist.   Eyes:      Extraocular Movements: Extraocular movements intact.      Conjunctiva/sclera: Conjunctivae normal.   Neck:      Thyroid: No thyromegaly or thyroid tenderness.   Cardiovascular:      Rate and Rhythm: Normal rate and regular rhythm.      Pulses: Normal pulses.      Heart sounds: Normal heart sounds.   Pulmonary:      Effort: Pulmonary effort is normal. No respiratory distress.      Breath sounds: Normal breath sounds. No wheezing.   Abdominal:      General: Bowel sounds are normal. There is no distension.      " Palpations: Abdomen is soft.      Tenderness: There is no abdominal tenderness.   Musculoskeletal:      Cervical back: Neck supple. No tenderness.      Right lower leg: No edema.      Left lower leg: No edema.   Lymphadenopathy:      Cervical: No cervical adenopathy.   Skin:     Coloration: Skin is not pale.   Neurological:      Mental Status: She is alert and oriented to person, place, and time.   Psychiatric:         Mood and Affect: Mood normal.           Recent Results (from the past 672 hour(s))   ECG 12 lead    Collection Time: 02/02/24  8:15 AM   Result Value Ref Range    Ventricular Rate 65 BPM    Atrial Rate 65 BPM    KS Interval 140 ms    QRSD Interval 86 ms    QT Interval 398 ms    QTC Interval 413 ms    P Axis 45 degrees    QRS Axis 42 degrees    T Wave Axis 91 degrees   CBC and Platelet    Collection Time: 02/02/24  8:21 AM   Result Value Ref Range    WBC 5.16 4.31 - 10.16 Thousand/uL    RBC 4.55 3.81 - 5.12 Million/uL    Hemoglobin 13.9 11.5 - 15.4 g/dL    Hematocrit 42.0 34.8 - 46.1 %    MCV 92 82 - 98 fL    MCH 30.5 26.8 - 34.3 pg    MCHC 33.1 31.4 - 37.4 g/dL    RDW 11.8 11.6 - 15.1 %    Platelets 297 149 - 390 Thousands/uL    MPV 9.6 8.9 - 12.7 fL   Hemoglobin A1C    Collection Time: 02/02/24  8:21 AM   Result Value Ref Range    Hemoglobin A1C 6.1 (H) Normal 4.0-5.6%; PreDiabetic 5.7-6.4%; Diabetic >=6.5%; Glycemic control for adults with diabetes <7.0% %     mg/dl   Basic metabolic panel    Collection Time: 02/02/24  8:40 AM   Result Value Ref Range    Sodium 140 135 - 147 mmol/L    Potassium 4.1 3.5 - 5.3 mmol/L    Chloride 105 96 - 108 mmol/L    CO2 31 21 - 32 mmol/L    ANION GAP 4 mmol/L    BUN 19 5 - 25 mg/dL    Creatinine 0.90 0.60 - 1.30 mg/dL    Glucose, Fasting 98 65 - 99 mg/dL    Calcium 9.4 8.4 - 10.2 mg/dL    eGFR 66 ml/min/1.73sq m     \

## 2024-02-21 PROBLEM — J01.11 ACUTE RECURRENT FRONTAL SINUSITIS: Status: RESOLVED | Noted: 2019-05-06 | Resolved: 2024-02-21

## 2024-02-21 PROBLEM — R05.9 COUGH: Status: RESOLVED | Noted: 2020-01-02 | Resolved: 2024-02-21

## 2024-02-21 NOTE — DISCHARGE INSTRUCTIONS
Sacrocolpopexy  WHAT YOU NEED TO KNOW:   Sacrocolpopexy is an operation to correct uterine prolapse or to correct vaginal prolapse in women who have had a hysterectomy. This surgery offers long-term treatment of apical prolapse with success rates greater than 80 percent.     Avoid lifting anything that is too heavy to lift with one hand for six to eight weeks.     DISCHARGE INSTRUCTIONS:   Medicines:   Pain medicine:    You may need medicine to take away or decrease pain.     Learn how to take your medicine. Ask what medicine and how much you should take. Be sure you know how, when, and how often to take it.    Do not wait until the pain is severe before you take your medicine. Tell caregivers if your pain does not decrease.    Pain medicine can make you dizzy or sleepy. Prevent falls by calling someone when you get out of bed or if you need help.    Antibiotics:  This medicine is given to fight or prevent an infection caused by bacteria. Always take your antibiotics exactly as ordered by your healthcare provider. Do not stop taking your medicine unless directed by your healthcare provider. Never save antibiotics or take leftover antibiotics that were given to you for another illness.    Take your medicine as directed.  Contact your healthcare provider if you think your medicine is not helping or if you have side effects. Tell him or her if you are allergic to any medicine. Keep a list of the medicines, vitamins, and herbs you take. Include the amounts, and when and why you take them. Bring the list or the pill bottles to follow-up visits. Carry your medicine list with you in case of an emergency.  Follow up with your healthcare provider as directed:  Write down your questions so you remember to ask them during your visits.   Self-care:   Sex:  Do not have sex until your healthcare provider says it is okay.    Kegel exercises:  To do kegel exercises, squeeze your pelvic floor muscles for 5 to 10 seconds, then  release. Regular kegel exercises will help your pelvic floor muscles become stronger. This will help prevent you from leaking urine. Ask your healthcare provider when to start these exercises and how often to do them.    Sanitary pad:  Change your sanitary pad regularly. Keep track of how often you change the pad.    Crouch catheter:  Keep the bag below your waist. This will help prevent infection and other problems caused by urine flowing back into your bladder. Do not pull on the catheter because this can cause pain and bleeding, and the catheter could come out. Keep the catheter tubing free of kinks so your urine will flow into the bag. Your healthcare provider will remove the catheter as soon as possible, to help prevent infection.    Wound care:  When you are allowed to bathe or shower, carefully wash your vaginal area with soap and water.     Do not put pressure on your abdomen:  This will help prevent damage to your surgery area. Do not strain, lift heavy objects, or stand for a very long time. Do not perform strenuous exercises, such as running and weight lifting.    Activity:  You may need to start walking within a few days after your procedure. Ask your healthcare provider when to start and how long you should walk. Ask about any other exercises that may be right for you.    Support socks:  You may need to wear support socks. These are tight socks that help increase the circulation in your legs until you are more active. This helps prevent blood clots.  Contact your healthcare provider if:   You soak a sanitary pad with blood every hour for 4 hours.    You have vaginal pain that does not go away even after you take pain medicine.    You have pus or a foul-smelling discharge from your genital area.     You see blood in your urine.    You have pain during sex.    You have a fever, chills, a cough, or feel weak and achy.    You have nausea and vomiting.    You have questions or concerns about your condition or  care.  Seek care immediately or call 911 if:   You feel something is bulging out into your vagina or rectum and not going back in.    You cannot urinate.    Your arm or leg feels warm, tender, and painful. It may look swollen and red.    You suddenly feel lightheaded and short of breath.    You have chest pain. You may have more pain when you take a deep breath or cough. You may cough up blood.  © 2017 [employer] Information is for End User's use only and may not be sold, redistributed or otherwise used for commercial purposes. All illustrations and images included in CareNotes® are the copyrighted property of [employer] or [employer].  The above information is an  only. It is not intended as medical advice for individual conditions or treatments. Talk to your doctor, nurse or pharmacist before following any medical regimen to see if it is safe and effective for you.    Urethral Sling Procedure   WHAT YOU NEED TO KNOW:   A bladder sling procedure is surgery to treat urinary incontinence in women. The sling acts as a hammock to keep your urethra in place and hold it closed when your bladder is full. You may have vaginal bleeding or discharge for up to a week after your surgery. Use sanitary pads. Do not use tampons. You may have some pelvic discomfort or trouble urinating.   DISCHARGE INSTRUCTIONS:   Call 911 for any of the following:   You have sudden trouble breathing.    Seek care immediately if:   Your bleeding gets worse.    You have yellow or foul smelling discharge from your vagina.    You cannot urinate, or you are urinating less than what is normal for you.    You feel confused.  Contact your healthcare provider if:   You have a fever.    You do not feel like you are able to empty your bladder completely when you urinate.    You feel the need to urinate very suddenly.    You have burning or stinging when you urinate.    You have blood in your urine.    Your  skin is itchy, swollen, or you have a rash.    You have questions or concerns about your condition or care.  Medicines:   Prescription pain medicine  may be given. Ask your how to take this medicine safely.    Take your medicine as directed.  Contact your healthcare provider if you think your medicine is not helping or if you have side effects. Tell him or her if you are allergic to any medicine. Keep a list of the medicines, vitamins, and herbs you take. Include the amounts, and when and why you take them. Bring the list or the pill bottles to follow-up visits. Carry your medicine list with you in case of an emergency.  Self-catheterization:  You may need to put a catheter into your bladder after you urinate to empty any remaining urine. A catheter is a small rubber tube used to drain urine. Healthcare providers will teach you how to put the catheter in safely. This may be needed until you are completely emptying your bladder.  Crouch catheter:  You may have a Crouch catheter for a short period of time. The Crouch is a tube put into your bladder to drain urine into a bag. Keep the bag below your waist. This will prevent urine from flowing back into your bladder and causing an infection or other problems. Also, keep the tube free of kinks so the urine will drain properly. Do not pull on the catheter. This can cause pain and bleeding, and may cause the catheter to come out.   Activity:  Do not lift heavy objects for 6 weeks after your procedure. Do not have intercourse for 4 to 6 weeks. Do not use a tampon for 4 weeks. Ask your healthcare provider when you can return to work or your usual activities.  Do pelvic muscle exercises:  These are also called Kegel exercises. These exercises help strengthen your pelvic muscles and help prevent urine leakage. Tighten the muscles of your pelvis and hold them tight for 5 seconds, then relax for 5 seconds. Gradually work up to tightening them for 10 seconds and relaxing for 10  seconds. Do this 3 times each day.  Keep a record:  Keep a record of when you urinate and if you leak any urine. Write down what you were doing when you leaked urine, such as coughing or sneezing. Bring the log to your follow-up visits.  Prevent constipation:  Drink liquids as directed. You may need to drink more water than usual to soften your bowel movements. Eat a variety of healthy foods, especially fruit and foods high in fiber. You may need to use an over-the-counter bowel movement softener.  Follow up with your healthcare provider as directed:  You may need a test to check how much urine remains in your bladder after you urinate. This will help show how the sling is working. Write down your questions so you remember to ask them during your visits.  © 2017 [employer] Information is for End User's use only and may not be sold, redistributed or otherwise used for commercial purposes. All illustrations and images included in CareNotes® are the copyrighted property of [employer] or [employer].  The above information is an  only. It is not intended as medical advice for individual conditions or treatments. Talk to your doctor, nurse or pharmacist before following any medical regimen to see if it is safe and effective for you.

## 2024-02-22 RX ORDER — FLUTICASONE PROPIONATE 50 MCG
1 SPRAY, SUSPENSION (ML) NASAL AS NEEDED
COMMUNITY

## 2024-02-22 NOTE — PRE-PROCEDURE INSTRUCTIONS
Pre-Surgery Instructions:   Medication Instructions    atorvastatin (LIPITOR) 10 mg tablet Take day of surgery.    Calcium 200 MG TABS Stop taking 7 days prior to surgery.    Cholecalciferol (Vitamin D3) 50 MCG (2000 UT) capsule Stop taking 7 days prior to surgery.    Coenzyme Q10 (CoQ-10) 100 MG capsule Stop taking 7 days prior to surgery.    fluticasone (FLONASE) 50 mcg/act nasal spray Uses PRN- OK to take day of surgery    Multiple Vitamins-Minerals (Multivitamin & Mineral) LIQD Stop taking 7 days prior to surgery.    valsartan (DIOVAN) 160 mg tablet Hold day of surgery.    Medication instructions for day surgery reviewed. Please use only a sip of water to take your instructed medications. Avoid all over the counter vitamins, supplements and NSAIDS for one week prior to surgery per anesthesia guidelines. Tylenol is ok to take as needed.     You will receive a call one business day prior to surgery with an arrival time and hospital directions. If your surgery is scheduled on a Monday, the hospital will be calling you on the Friday prior to your surgery. If you have not heard from anyone by 8pm, please call the hospital supervisor through the hospital  at [phone]. ([place] [phone] or [place] [phone]).    Do not eat or drink anything after midnight the night before your surgery, including candy, mints, lifesavers, or chewing gum. Do not drink alcohol 24hrs before your surgery. Try not to smoke at least 24hrs before your surgery.       Follow the pre surgery showering instructions as listed in the “My Surgical Experience Booklet” or otherwise provided by your surgeon's office. Do not use a blade to shave the surgical area 1 week before surgery. It is okay to use a clean electric clippers up to 24 hours before surgery. Do not apply any lotions, creams, including makeup, cologne, deodorant, or perfumes after showering on the day of your surgery. Do not use dry shampoo, hair spray, hair gel, or  any type of hair products.     No contact lenses, eye make-up, or artificial eyelashes. Remove nail polish, including gel polish, and any artificial, gel, or acrylic nails if possible. Remove all jewelry including rings and body piercing jewelry.     Wear causal clothing that is easy to take on and off. Consider your type of surgery.    Keep any valuables, jewelry, piercings at home. Please bring any specially ordered equipment (sling, braces) if indicated.    Arrange for a responsible person to drive you to and from the hospital on the day of your surgery. Please confirm the visitor policy for the day of your procedure when you receive your phone call with an arrival time.     Call the surgeon's office with any new illnesses, exposures, or additional questions prior to surgery.    Please reference your “My Surgical Experience Booklet” for additional information to prepare for your upcoming surgery.

## 2024-02-29 ENCOUNTER — ANESTHESIA EVENT (OUTPATIENT)
Dept: PERIOP | Facility: HOSPITAL | Age: 69
End: 2024-02-29
Payer: COMMERCIAL

## 2024-03-01 ENCOUNTER — HOSPITAL ENCOUNTER (OUTPATIENT)
Facility: HOSPITAL | Age: 69
Setting detail: OUTPATIENT SURGERY
Discharge: HOME | End: 2024-03-01
Attending: OBSTETRICS & GYNECOLOGY | Admitting: OBSTETRICS & GYNECOLOGY
Payer: COMMERCIAL

## 2024-03-01 ENCOUNTER — ANESTHESIA (OUTPATIENT)
Dept: PERIOP | Facility: HOSPITAL | Age: 69
End: 2024-03-01
Payer: COMMERCIAL

## 2024-03-01 VITALS
WEIGHT: 147.49 LBS | DIASTOLIC BLOOD PRESSURE: 63 MMHG | RESPIRATION RATE: 18 BRPM | TEMPERATURE: 97.7 F | SYSTOLIC BLOOD PRESSURE: 132 MMHG | OXYGEN SATURATION: 95 % | BODY MASS INDEX: 24.54 KG/M2 | HEART RATE: 75 BPM

## 2024-03-01 DIAGNOSIS — N81.2 INCOMPLETE UTEROVAGINAL PROLAPSE: ICD-10-CM

## 2024-03-01 DIAGNOSIS — N36.41 HYPERMOBILITY OF URETHRA: ICD-10-CM

## 2024-03-01 DIAGNOSIS — G89.18 POSTOPERATIVE PAIN: Primary | ICD-10-CM

## 2024-03-01 PROBLEM — K21.9 GASTROESOPHAGEAL REFLUX DISEASE: Status: ACTIVE | Noted: 2024-03-01

## 2024-03-01 LAB
ABO GROUP BLD: NORMAL
ABO GROUP BLD: NORMAL
BLD GP AB SCN SERPL QL: NEGATIVE
RH BLD: POSITIVE
RH BLD: POSITIVE
SPECIMEN EXPIRATION DATE: NORMAL

## 2024-03-01 PROCEDURE — 88342 IMHCHEM/IMCYTCHM 1ST ANTB: CPT | Performed by: PATHOLOGY

## 2024-03-01 PROCEDURE — 88360 TUMOR IMMUNOHISTOCHEM/MANUAL: CPT | Performed by: PATHOLOGY

## 2024-03-01 PROCEDURE — 86901 BLOOD TYPING SEROLOGIC RH(D): CPT | Performed by: OBSTETRICS & GYNECOLOGY

## 2024-03-01 PROCEDURE — 86900 BLOOD TYPING SEROLOGIC ABO: CPT | Performed by: OBSTETRICS & GYNECOLOGY

## 2024-03-01 PROCEDURE — NC001 PR NO CHARGE: Performed by: PHYSICIAN ASSISTANT

## 2024-03-01 PROCEDURE — 88305 TISSUE EXAM BY PATHOLOGIST: CPT | Performed by: PATHOLOGY

## 2024-03-01 PROCEDURE — C1771 REP DEV, URINARY, W/SLING: HCPCS | Performed by: OBSTETRICS & GYNECOLOGY

## 2024-03-01 PROCEDURE — 86850 RBC ANTIBODY SCREEN: CPT | Performed by: OBSTETRICS & GYNECOLOGY

## 2024-03-01 PROCEDURE — 88307 TISSUE EXAM BY PATHOLOGIST: CPT | Performed by: PATHOLOGY

## 2024-03-01 PROCEDURE — C1781 MESH (IMPLANTABLE): HCPCS | Performed by: OBSTETRICS & GYNECOLOGY

## 2024-03-01 DEVICE — POLYPROPYLENE MESH FOR SACROCOLPOSUSPENSION/SACROCOLPOPEXY - L
Type: IMPLANTABLE DEVICE | Site: CERVIX | Status: FUNCTIONAL
Brand: RESTORELLE

## 2024-03-01 DEVICE — SINGLE INCISION SLING SYSTEM
Type: IMPLANTABLE DEVICE | Site: BLADDER | Status: FUNCTIONAL
Brand: ALTIS

## 2024-03-01 RX ORDER — HYDROMORPHONE HCL/PF 1 MG/ML
0.5 SYRINGE (ML) INJECTION
Status: DISCONTINUED | OUTPATIENT
Start: 2024-03-01 | End: 2024-03-01 | Stop reason: HOSPADM

## 2024-03-01 RX ORDER — SODIUM CHLORIDE 9 MG/ML
INJECTION INTRAVENOUS AS NEEDED
Status: DISCONTINUED | OUTPATIENT
Start: 2024-03-01 | End: 2024-03-01 | Stop reason: HOSPADM

## 2024-03-01 RX ORDER — BUPIVACAINE HYDROCHLORIDE 5 MG/ML
INJECTION, SOLUTION EPIDURAL; INTRACAUDAL AS NEEDED
Status: DISCONTINUED | OUTPATIENT
Start: 2024-03-01 | End: 2024-03-01 | Stop reason: HOSPADM

## 2024-03-01 RX ORDER — SODIUM CHLORIDE 9 MG/ML
125 INJECTION, SOLUTION INTRAVENOUS CONTINUOUS
Status: DISCONTINUED | OUTPATIENT
Start: 2024-03-01 | End: 2024-03-01 | Stop reason: HOSPADM

## 2024-03-01 RX ORDER — ONDANSETRON 2 MG/ML
4 INJECTION INTRAMUSCULAR; INTRAVENOUS EVERY 6 HOURS PRN
Status: DISCONTINUED | OUTPATIENT
Start: 2024-03-01 | End: 2024-03-01 | Stop reason: HOSPADM

## 2024-03-01 RX ORDER — SODIUM CHLORIDE, SODIUM LACTATE, POTASSIUM CHLORIDE, CALCIUM CHLORIDE 600; 310; 30; 20 MG/100ML; MG/100ML; MG/100ML; MG/100ML
INJECTION, SOLUTION INTRAVENOUS CONTINUOUS PRN
Status: DISCONTINUED | OUTPATIENT
Start: 2024-03-01 | End: 2024-03-01

## 2024-03-01 RX ORDER — LIDOCAINE HYDROCHLORIDE 20 MG/ML
INJECTION, SOLUTION EPIDURAL; INFILTRATION; INTRACAUDAL; PERINEURAL AS NEEDED
Status: DISCONTINUED | OUTPATIENT
Start: 2024-03-01 | End: 2024-03-01

## 2024-03-01 RX ORDER — MIDAZOLAM HYDROCHLORIDE 2 MG/2ML
INJECTION, SOLUTION INTRAMUSCULAR; INTRAVENOUS AS NEEDED
Status: DISCONTINUED | OUTPATIENT
Start: 2024-03-01 | End: 2024-03-01

## 2024-03-01 RX ORDER — TRAMADOL HYDROCHLORIDE 50 MG/1
50 TABLET ORAL EVERY 6 HOURS PRN
Qty: 10 TABLET | Refills: 0 | Status: SHIPPED | OUTPATIENT
Start: 2024-03-01 | End: 2024-03-11

## 2024-03-01 RX ORDER — FENTANYL CITRATE/PF 50 MCG/ML
50 SYRINGE (ML) INJECTION
Status: DISCONTINUED | OUTPATIENT
Start: 2024-03-01 | End: 2024-03-01 | Stop reason: HOSPADM

## 2024-03-01 RX ORDER — ROCURONIUM BROMIDE 10 MG/ML
INJECTION, SOLUTION INTRAVENOUS AS NEEDED
Status: DISCONTINUED | OUTPATIENT
Start: 2024-03-01 | End: 2024-03-01

## 2024-03-01 RX ORDER — FENTANYL CITRATE 50 UG/ML
INJECTION, SOLUTION INTRAMUSCULAR; INTRAVENOUS AS NEEDED
Status: DISCONTINUED | OUTPATIENT
Start: 2024-03-01 | End: 2024-03-01

## 2024-03-01 RX ORDER — CEFAZOLIN SODIUM 1 G/50ML
1000 SOLUTION INTRAVENOUS ONCE
Status: COMPLETED | OUTPATIENT
Start: 2024-03-01 | End: 2024-03-01

## 2024-03-01 RX ORDER — GLYCOPYRROLATE 0.2 MG/ML
INJECTION INTRAMUSCULAR; INTRAVENOUS AS NEEDED
Status: DISCONTINUED | OUTPATIENT
Start: 2024-03-01 | End: 2024-03-01

## 2024-03-01 RX ORDER — ACETAMINOPHEN 325 MG/1
975 TABLET ORAL ONCE
Status: COMPLETED | OUTPATIENT
Start: 2024-03-01 | End: 2024-03-01

## 2024-03-01 RX ORDER — ONDANSETRON 2 MG/ML
INJECTION INTRAMUSCULAR; INTRAVENOUS AS NEEDED
Status: DISCONTINUED | OUTPATIENT
Start: 2024-03-01 | End: 2024-03-01

## 2024-03-01 RX ORDER — IBUPROFEN 600 MG/1
600 TABLET ORAL EVERY 6 HOURS PRN
Status: DISCONTINUED | OUTPATIENT
Start: 2024-03-01 | End: 2024-03-01 | Stop reason: HOSPADM

## 2024-03-01 RX ORDER — PHENAZOPYRIDINE HYDROCHLORIDE 100 MG/1
100 TABLET, FILM COATED ORAL ONCE
Status: COMPLETED | OUTPATIENT
Start: 2024-03-01 | End: 2024-03-01

## 2024-03-01 RX ORDER — HYDROMORPHONE HCL/PF 1 MG/ML
SYRINGE (ML) INJECTION AS NEEDED
Status: DISCONTINUED | OUTPATIENT
Start: 2024-03-01 | End: 2024-03-01

## 2024-03-01 RX ORDER — PHENYLEPHRINE HCL IN 0.9% NACL 1 MG/10 ML
SYRINGE (ML) INTRAVENOUS AS NEEDED
Status: DISCONTINUED | OUTPATIENT
Start: 2024-03-01 | End: 2024-03-01

## 2024-03-01 RX ORDER — ACETAMINOPHEN 325 MG/1
975 TABLET ORAL EVERY 6 HOURS PRN
Status: DISCONTINUED | OUTPATIENT
Start: 2024-03-01 | End: 2024-03-01 | Stop reason: HOSPADM

## 2024-03-01 RX ORDER — ONDANSETRON 2 MG/ML
4 INJECTION INTRAMUSCULAR; INTRAVENOUS ONCE AS NEEDED
Status: DISCONTINUED | OUTPATIENT
Start: 2024-03-01 | End: 2024-03-01 | Stop reason: HOSPADM

## 2024-03-01 RX ORDER — DEXAMETHASONE SODIUM PHOSPHATE 10 MG/ML
INJECTION, SOLUTION INTRAMUSCULAR; INTRAVENOUS AS NEEDED
Status: DISCONTINUED | OUTPATIENT
Start: 2024-03-01 | End: 2024-03-01

## 2024-03-01 RX ORDER — SODIUM CHLORIDE 9 MG/ML
INJECTION, SOLUTION INTRAVENOUS CONTINUOUS PRN
Status: DISCONTINUED | OUTPATIENT
Start: 2024-03-01 | End: 2024-03-01

## 2024-03-01 RX ORDER — PROPOFOL 10 MG/ML
INJECTION, EMULSION INTRAVENOUS AS NEEDED
Status: DISCONTINUED | OUTPATIENT
Start: 2024-03-01 | End: 2024-03-01

## 2024-03-01 RX ADMIN — SODIUM CHLORIDE: 0.9 INJECTION, SOLUTION INTRAVENOUS at 08:35

## 2024-03-01 RX ADMIN — CEFAZOLIN SODIUM 1000 MG: 1 SOLUTION INTRAVENOUS at 07:55

## 2024-03-01 RX ADMIN — FENTANYL CITRATE 50 MCG: 50 INJECTION, SOLUTION INTRAMUSCULAR; INTRAVENOUS at 11:38

## 2024-03-01 RX ADMIN — ACETAMINOPHEN 325MG 975 MG: 325 TABLET ORAL at 05:41

## 2024-03-01 RX ADMIN — SODIUM CHLORIDE 125 ML/HR: 0.9 INJECTION, SOLUTION INTRAVENOUS at 05:49

## 2024-03-01 RX ADMIN — MIDAZOLAM 2 MG: 1 INJECTION INTRAMUSCULAR; INTRAVENOUS at 07:48

## 2024-03-01 RX ADMIN — PROPOFOL 200 MG: 10 INJECTION, EMULSION INTRAVENOUS at 07:50

## 2024-03-01 RX ADMIN — ONDANSETRON 4 MG: 2 INJECTION INTRAMUSCULAR; INTRAVENOUS at 10:37

## 2024-03-01 RX ADMIN — FENTANYL CITRATE 50 MCG: 50 INJECTION INTRAMUSCULAR; INTRAVENOUS at 10:37

## 2024-03-01 RX ADMIN — FENTANYL CITRATE 50 MCG: 50 INJECTION INTRAMUSCULAR; INTRAVENOUS at 07:48

## 2024-03-01 RX ADMIN — FENTANYL CITRATE 50 MCG: 50 INJECTION INTRAMUSCULAR; INTRAVENOUS at 08:02

## 2024-03-01 RX ADMIN — HYDROMORPHONE HYDROCHLORIDE 0.5 MG: 1 INJECTION, SOLUTION INTRAMUSCULAR; INTRAVENOUS; SUBCUTANEOUS at 09:24

## 2024-03-01 RX ADMIN — SUGAMMADEX 200 MG: 100 INJECTION, SOLUTION INTRAVENOUS at 10:56

## 2024-03-01 RX ADMIN — GLYCOPYRROLATE 0.3 MG: 0.2 INJECTION, SOLUTION INTRAMUSCULAR; INTRAVENOUS at 08:19

## 2024-03-01 RX ADMIN — Medication 100 MCG: at 10:58

## 2024-03-01 RX ADMIN — FENTANYL CITRATE 50 MCG: 50 INJECTION INTRAMUSCULAR; INTRAVENOUS at 08:35

## 2024-03-01 RX ADMIN — SODIUM CHLORIDE: 0.9 INJECTION, SOLUTION INTRAVENOUS at 08:54

## 2024-03-01 RX ADMIN — IBUPROFEN 600 MG: 600 TABLET, FILM COATED ORAL at 13:53

## 2024-03-01 RX ADMIN — DEXAMETHASONE SODIUM PHOSPHATE 10 MG: 10 INJECTION INTRAMUSCULAR; INTRAVENOUS at 08:08

## 2024-03-01 RX ADMIN — FENTANYL CITRATE 50 MCG: 50 INJECTION, SOLUTION INTRAMUSCULAR; INTRAVENOUS at 11:56

## 2024-03-01 RX ADMIN — LIDOCAINE HYDROCHLORIDE 80 MG: 20 INJECTION, SOLUTION EPIDURAL; INFILTRATION; INTRACAUDAL; PERINEURAL at 07:50

## 2024-03-01 RX ADMIN — ROCURONIUM BROMIDE 30 MG: 10 INJECTION, SOLUTION INTRAVENOUS at 08:46

## 2024-03-01 RX ADMIN — PHENAZOPYRIDINE 100 MG: 100 TABLET ORAL at 05:52

## 2024-03-01 RX ADMIN — SODIUM CHLORIDE: 0.9 INJECTION, SOLUTION INTRAVENOUS at 07:56

## 2024-03-01 RX ADMIN — ROCURONIUM BROMIDE 50 MG: 10 INJECTION, SOLUTION INTRAVENOUS at 07:50

## 2024-03-01 RX ADMIN — ROCURONIUM BROMIDE 20 MG: 10 INJECTION, SOLUTION INTRAVENOUS at 09:37

## 2024-03-01 RX ADMIN — SODIUM CHLORIDE 125 ML/HR: 0.9 INJECTION, SOLUTION INTRAVENOUS at 12:13

## 2024-03-01 RX ADMIN — SODIUM CHLORIDE, SODIUM LACTATE, POTASSIUM CHLORIDE, AND CALCIUM CHLORIDE: .6; .31; .03; .02 INJECTION, SOLUTION INTRAVENOUS at 10:49

## 2024-03-01 NOTE — ANESTHESIA POSTPROCEDURE EVALUATION
Post-Op Assessment Note    CV Status:  Stable    Pain management: adequate       Mental Status:  Alert and awake   Hydration Status:  Euvolemic   PONV Controlled:  Controlled   Airway Patency:  Patent     Post Op Vitals Reviewed: Yes    No anethesia notable event occurred.    Staff: Anesthesiologist               /56 (03/01/24 1240)    Temp (!) 97.2 °F (36.2 °C) (03/01/24 1240)    Pulse 77 (03/01/24 1240)   Resp 14 (03/01/24 1240)    SpO2 91 % (03/01/24 1240)    /56   Pulse 77   Temp (!) 97.2 °F (36.2 °C) (Temporal)   Resp 14   Wt 66.9 kg (147 lb 7.8 oz)   SpO2 91%   BMI 24.54 kg/m²

## 2024-03-01 NOTE — ANESTHESIA PREPROCEDURE EVALUATION
Procedure:  LTH; BSO; EUA (Abdomen)  ROBOTIC COLPOPEXY SACRAL (Abdomen)  PV SLING (Vagina )  CYSTO (Bladder)    Relevant Problems   CARDIO   (+) Hyperlipidemia   (+) Hypertension      GI/HEPATIC   (+) Gastroesophageal reflux disease      MUSCULOSKELETAL   (+) Lateral epicondylitis of both elbows   (+) Localized, primary osteoarthritis of hand        Physical Exam    Airway    Mallampati score: II  TM Distance: >3 FB  Neck ROM: full     Dental   No notable dental hx     Cardiovascular  Rhythm: regular, Rate: normal, Cardiovascular exam normal    Pulmonary  Pulmonary exam normal Breath sounds clear to auscultation    Other Findings  post-pubertal.      Anesthesia Plan  ASA Score- 2     Anesthesia Type- general with ASA Monitors.         Additional Monitors:     Airway Plan: ETT.    Comment: Consented for TAP prn open Sx..       Plan Factors-    Chart reviewed.    Patient summary reviewed.                  Induction- intravenous.    Postoperative Plan- Plan for postoperative opioid use.     Informed Consent- Anesthetic plan and risks discussed with patient.

## 2024-03-01 NOTE — OP NOTE
OPERATIVE REPORT  PATIENT NAME: Sagrario Potts    :  1955  MRN: [id number]  Pt Location: [facility] 08    SURGERY DATE: 3/1/2024    Surgeons and Role:     * Mac Rutherford MD - Primary     * LEYDA Guaman-ED - PA Assisting at bedside for suture and instrument exchange     * Alin Luna MD - Fellow Assisting    Preop Diagnosis:  Incomplete uterovaginal prolapse [N81.2]  Stress urinary incontinence [N39.3]  Cystocele [N81.10]    Postop Diagnosis:  Incomplete uterovaginal prolapse [N81.2]  Stress urinary incontinence [N39.3]  Cystocele [N81.10]  Left ovarian cyst [N83.202]  Vulvar cyst [N90.7]    Procedure(s):  1 - Robotic assisted total laparoscopic hysterectomy  2 - Bilateral salpingo oophorectomy  3 - Sacrocolpopexy  4 - Pubovaginal sling (ALTIS)  5 - Excision of vulvar cyst  6 - Cystoscopy    Specimen(s):  ID Type Source Tests Collected by Time Destination   1 : Uterus , Cervix, Right tube and Ovary Tissue Uterus TISSUE EXAM Mac Rutherford MD 3/1/2024 0940    2 : Left Tube, Ovary and Cyst Tissue Fallopian Tube, Left TISSUE EXAM Mac Rutherford MD 3/1/2024 0939        Estimated Blood Loss:   30 mL    Drains:  NG/OG/Enteral Tube Orogastric 18 Fr Center mouth (Active)   Number of days: 0       Urethral Catheter Non-latex 16 Fr. (Active)   Number of days: 0       Anesthesia Type:   General    Operative Findings:  10 cm left ovarian cyst, smooth. Placed in specimen bag intact and removed via vagina.    Cystoscopy at the end of the procedure showed normal appearing urothelium with no suture, mesh, or other foreign body. Bilateral ureteral jets were seen.      Complications:   None    Procedure and Technique:  Patient was brought back to the operating room and placed on operating table. Following administration of general anesthesia, she was prepped and draped in dorsal lithotomy position using Jean Paul stirrups in the normal sterile fashion. Antibiotics were given for surgical  prophylaxis and SCD boots were on and activated with DVT prophylaxis. Following surgical timeout, a Crouch catheter was placed to drain the bladder.      The Veress needle was inserted safely and intraperitoneal placement confirmed. Opening pressures were appropriately low, and insufflation was carried out to an intra-abdominal pressure of 15 mmHg. The peritoneal cavity was entered safely under direct visualization using an 8 mm optical trocar above the umbilicus. The pelvis was inspected and no significant adhesions were noted. The patient’s liver, abdomen, and pelvis appeared normal. The epigastric vessels were visualized and two robotic 8 mm ports were placed on the patient's left under direct visualization and an robotic 8 mm port as well as an 8 mm assist port was placed on the patient’s right. The supraumbilical port was upgraded to a 12 mm port. The Stitch Kit was placed into the abdominal cavity through this port.    Next, attention was turned to the single incision pubovaginal sling (using the ALTIS system). The abdomen was deflated for this portion of the case. Hydrodissection of the vaginal wall beneath the mid urethra and vaginal sulci was performed.  A 2 cm midurethral incision was made and periurethral tunnels were created towards the obturator membranes at 2 and 10 o'clock.  The sling trocar was inserted into the fixation stay of the mesh and the static end of the mesh was placed into the left periurethral tunnel with the bladder protected and the tip of the trocar against the  complex.  The trocar was first pushed cephalad, then advanced laterally until a pop was appreciated, then the trocar was rotated 1/4 turn, then withdrawn, leaving the stay in place.  This same procedure was repeated on the patient's right side with the adjustable end of the sling.  Sling was tightened off-tension against the urethra. The adjustment suture was cut.  The vaginal incision was closed with 2-0 Vicryl  suture.    We then performed excision of vulvar cyst. A 2 cm inclusion cyst was identified on her left labia majora. After injecting local anesthesia to the base of the cyst, the cyst was incised and the cyst wall and contents of the cyst were excised. The skin was reapproximated with 2-0 vicryl suture.    We then began the hysterectomy. The abdomen was insufflated again and the surgical robot was docked using standard protocol. A tenaculum, maryland bipolar, and monopolar scissors were inserted under direct visulization.     There was a large, smooth, 10 cm left ovarian cyst. The uterus was lifted on traction with a tenaculum. The ureters were identified, traced throughout their course in the pelvis and noted to be safely out of the operative field for the remainder of the case. After dividing the left round ligament and opening the broad ligament, the left ureter was identified. A window was made in the medial leaf of the broad ligament and the left IP ligament was ligated and transected. The left tube and ovary with cyst were dissected off of the uterus.    We then completed the rest of the hysterectomy. The right ovarian vessels were secured using the maryland bipolar lateral to the normal ovary. Serial descending pedicles were created. The bladder flap was developed well below the cervicovaginal junction. The maryland bipolar then was used to incorporate the uterine vessels, cardinal, and uterosacral ligaments. The uterus, cervix, and right tube and ovary were removed though the colpotomy incision. A specimen bag was introduced via the vagina and the left ovary and cyst were placed in the specimen bag. The opening of the specimen bag was exteriorized through the vagina and the specimen was drained while in the bag and removed. The vaginal cuff was closed with interrupted figure-of-eight stitches of 0 PDS suture. Excellent hemostasis was noted.    We then began the sacrocolpopexy (using Restorelle L mesh cut in  2 pieces). The presacral space was carefully opened and the anterior longitudinal ligament exposed below the promontory. The bladder was sharply dissected from the anterior vagina down to the trigoneand the posterior peritoneum was dissected to the level of the rectal reflection. A lightweight polypropylene mesh was sewn to the posterior and anterior vagina using 2 rows of Goretex sutures - 9 sutures were placed posteriorly and 7 anteriorly. Attention was again turned to the anterior longitudinal ligament and 2 Gortex sutures were placed through the ligament just below the promontory, then through the free end of the mesh so the vagina was elevated under an appropriate amount of tension. Excess mesh was excised. Excellent hemostasis was noted.    Cystoscopy was performed.  Brisk efflux was noted from both ureters.  The urothelium appeared normal with no lesions, suture, sling, or other foreign body. The Crouch catheter was reinserted.      The robot was undocked and the pelvis was irrigated. All instruments, Stitch Kit, and trocars were removed from the abdomen and pelvis, which was deflated of CO2. The fascia of the supra umbilical incision was closed using 0-Vicryl. All skin incisions were closed with 4-0 monocryl and sterile skin glue.     At the termination of the case, all counts were reported to the surgeons as correct. Excellent hemostatsis was noted. The patient was returned to the supine position, roused from her anesthetic and transported to the recovery room having tolerated the procedure well.     I spoke with the patient’s family after the case.     A qualified resident was not available for the procedure.    I was present for the entire procedure.    Patient Disposition:  PACU         SIGNATURE: Mac Rutherford MD  DATE: March 1, 2024  TIME: 11:12 AM

## 2024-03-06 PROCEDURE — 88307 TISSUE EXAM BY PATHOLOGIST: CPT | Performed by: PATHOLOGY

## 2024-03-06 PROCEDURE — 88342 IMHCHEM/IMCYTCHM 1ST ANTB: CPT | Performed by: PATHOLOGY

## 2024-03-06 PROCEDURE — 88305 TISSUE EXAM BY PATHOLOGIST: CPT | Performed by: PATHOLOGY

## 2024-03-06 PROCEDURE — 88360 TUMOR IMMUNOHISTOCHEM/MANUAL: CPT | Performed by: PATHOLOGY

## 2024-04-06 DIAGNOSIS — I10 ESSENTIAL HYPERTENSION: ICD-10-CM

## 2024-04-08 RX ORDER — VALSARTAN 160 MG/1
TABLET ORAL
Qty: 90 TABLET | Refills: 1 | Status: SHIPPED | OUTPATIENT
Start: 2024-04-08

## 2024-06-16 DIAGNOSIS — E78.01 FAMILIAL HYPERCHOLESTEROLEMIA: ICD-10-CM

## 2024-06-16 RX ORDER — ATORVASTATIN CALCIUM 10 MG/1
TABLET, FILM COATED ORAL
Qty: 90 TABLET | Refills: 1 | Status: SHIPPED | OUTPATIENT
Start: 2024-06-16

## 2024-07-03 ENCOUNTER — APPOINTMENT (OUTPATIENT)
Dept: LAB | Facility: AMBULARY SURGERY CENTER | Age: 69
End: 2024-07-03
Payer: COMMERCIAL

## 2024-07-03 LAB
ANION GAP SERPL CALCULATED.3IONS-SCNC: 6 MMOL/L (ref 4–13)
BUN SERPL-MCNC: 17 MG/DL (ref 5–25)
CALCIUM SERPL-MCNC: 8.9 MG/DL (ref 8.4–10.2)
CHLORIDE SERPL-SCNC: 105 MMOL/L (ref 96–108)
CO2 SERPL-SCNC: 29 MMOL/L (ref 21–32)
CREAT SERPL-MCNC: 1 MG/DL (ref 0.6–1.3)
EST. AVERAGE GLUCOSE BLD GHB EST-MCNC: 128 MG/DL
GFR SERPL CREATININE-BSD FRML MDRD: 58 ML/MIN/1.73SQ M
GLUCOSE P FAST SERPL-MCNC: 94 MG/DL (ref 65–99)
HBA1C MFR BLD: 6.1 %
POTASSIUM SERPL-SCNC: 4 MMOL/L (ref 3.5–5.3)
SODIUM SERPL-SCNC: 140 MMOL/L (ref 135–147)

## 2024-07-10 ENCOUNTER — OFFICE VISIT (OUTPATIENT)
Dept: INTERNAL MEDICINE CLINIC | Facility: CLINIC | Age: 69
End: 2024-07-10
Payer: COMMERCIAL

## 2024-07-10 VITALS
DIASTOLIC BLOOD PRESSURE: 76 MMHG | HEART RATE: 78 BPM | WEIGHT: 150 LBS | OXYGEN SATURATION: 97 % | TEMPERATURE: 97.6 F | SYSTOLIC BLOOD PRESSURE: 126 MMHG | BODY MASS INDEX: 24.99 KG/M2 | HEIGHT: 65 IN

## 2024-07-10 DIAGNOSIS — E78.01 FAMILIAL HYPERCHOLESTEROLEMIA: ICD-10-CM

## 2024-07-10 DIAGNOSIS — R73.9 HYPERGLYCEMIA: ICD-10-CM

## 2024-07-10 DIAGNOSIS — J30.1 SEASONAL ALLERGIC RHINITIS DUE TO POLLEN: ICD-10-CM

## 2024-07-10 DIAGNOSIS — I10 PRIMARY HYPERTENSION: ICD-10-CM

## 2024-07-10 DIAGNOSIS — L50.9 HIVES: ICD-10-CM

## 2024-07-10 DIAGNOSIS — N81.2 INCOMPLETE UTEROVAGINAL PROLAPSE: ICD-10-CM

## 2024-07-10 DIAGNOSIS — Z00.00 HEALTHCARE MAINTENANCE: Primary | ICD-10-CM

## 2024-07-10 PROCEDURE — 99214 OFFICE O/P EST MOD 30 MIN: CPT | Performed by: INTERNAL MEDICINE

## 2024-07-10 PROCEDURE — G2211 COMPLEX E/M VISIT ADD ON: HCPCS | Performed by: INTERNAL MEDICINE

## 2024-07-10 NOTE — ASSESSMENT & PLAN NOTE
Patient has a history of hyperglycemia but no overt diabetes.  There is a strong family history of diabetes.  She did have fasting blood sugar and hemoglobin A1c performed prior to the visit.  Her fasting blood sugar is normal and hemoglobin A1c continues to be in the prediabetic range at 6.1.  She states that she does watch her diet closely tries to limit her intake of concentrated sweets and simple carbohydrates and calories overall.  Is staying physically active but is not losing weight.  She is not a candidate for any type of medication at this point in time and we will continue to monitor her blood sugar readings have labs performed prior to her next visit

## 2024-07-10 NOTE — ASSESSMENT & PLAN NOTE
History of hypertension.  Blood pressure showing excellent control with present treatment.  Will continue present medication and surveillance.  Check on renal function with her next visit also.  Make adjustments with medication if necessary in the future.

## 2024-07-10 NOTE — ASSESSMENT & PLAN NOTE
Status post vaginal hysterectomy.  Patient is complaining of some discomfort deep in the left groin area.  Will make an appointment to see her surgeon to see if this is related to anything internal

## 2024-07-10 NOTE — PROGRESS NOTES
Ambulatory Visit  Name: Sagrario Potts      : 1955      MRN: [id number]  Encounter Provider: Diego Eastman DO  Encounter Date: 7/10/2024   Encounter department: [facility] INTERNAL MEDICINE    Assessment & Plan   1. Healthcare maintenance  -     Comprehensive metabolic panel; Future; Expected date: 10/10/2024  -     CBC and differential; Future; Expected date: 10/10/2024  -     Lipid panel; Future; Expected date: 10/10/2024  -     Urinalysis with microscopic; Future  -     Hemoglobin A1C; Future  2. Hyperglycemia  Assessment & Plan:  Patient has a history of hyperglycemia but no overt diabetes.  There is a strong family history of diabetes.  She did have fasting blood sugar and hemoglobin A1c performed prior to the visit.  Her fasting blood sugar is normal and hemoglobin A1c continues to be in the prediabetic range at 6.1.  She states that she does watch her diet closely tries to limit her intake of concentrated sweets and simple carbohydrates and calories overall.  Is staying physically active but is not losing weight.  She is not a candidate for any type of medication at this point in time and we will continue to monitor her blood sugar readings have labs performed prior to her next visit  Orders:  -     Hemoglobin A1C; Future  3. Hives  Assessment & Plan:  Has developed a recurrent skin rash.  This is to her area of her right breast also the area of her abdomen on the left and left hip.  She states she gets this type of rash recurrently especially during the summer months.  She was seen in urgent care facility previously the rash above her left eye and this was treated with oral steroids.  We are going to try to treat her with oral antihistamines either Zyrtec Benadryl Claritin or Allegra.  We are changing her detergent that she is using in her wash changing to all Free to see with that this makes a difference.  She does not use any caustic soap at home for bathing.  If not improving she  will let us know and would consider placing patient again on steroids for short period of time.  4. Familial hypercholesterolemia  Assessment & Plan:  History of hyperlipidemia.  Patient remains on atorvastatin 10 mg daily.  Again patient states she does try to watch her intake of fats and cholesterol with her diet but admits she is not always perfect.  Check a lipid profile prior to her next visit and make adjustments to medication if needed.  5. Seasonal allergic rhinitis due to pollen  6. Primary hypertension  Assessment & Plan:  History of hypertension.  Blood pressure showing excellent control with present treatment.  Will continue present medication and surveillance.  Check on renal function with her next visit also.  Make adjustments with medication if necessary in the future.  7. Incomplete uterovaginal prolapse  Assessment & Plan:  Status post vaginal hysterectomy.  Patient is complaining of some discomfort deep in the left groin area.  Will make an appointment to see her surgeon to see if this is related to anything internal         History of Present Illness     Patient is a 68-year-old female history of multiple medical problems outlined previously who is here today for routine follow-up.  States that she has developed recurrent urticarial rash to her abdominal wall left hip.  Also is having some discomfort to the lower abdomen left-hand side which he feels is deep and may be related to previous surgery, hysterectomy.  Otherwise no new complaints and she did have labs performed prior to the visit and we did discuss the results.      Review of Systems   Constitutional: Negative.    HENT: Negative.     Eyes: Negative.    Respiratory: Negative.     Cardiovascular: Negative.    Gastrointestinal:  Positive for abdominal pain. Negative for abdominal distention, anal bleeding, blood in stool, constipation, diarrhea, nausea, rectal pain and vomiting.   Endocrine: Negative.    Genitourinary: Negative.     Musculoskeletal: Negative.    Skin:  Positive for rash. Negative for color change, pallor and wound.   Allergic/Immunologic: Negative.    Neurological: Negative.    Hematological: Negative.    Psychiatric/Behavioral: Negative.       Past Medical History:   Diagnosis Date    GERD (gastroesophageal reflux disease)     Hyperlipemia     Hypertension     PONV (postoperative nausea and vomiting)      Past Surgical History:   Procedure Laterality Date     SECTION      COLONOSCOPY  2020    Dr Petersen , 5 yr f/u     COLPOPEXY SACRAL LAPAROSCOPIC N/A 3/1/2024    Procedure: ROBOTIC COLPOPEXY SACRAL;  Surgeon: Mac Rutherford MD;  Location: [facility];  Service: UroGynecology           HYSTEROSCOPY W/ ENDOMETRIAL ABLATION      MT CYSTOURETHROSCOPY N/A 3/1/2024    Procedure: CYSTO;  Surgeon: Mac Rutherford MD;  Location: [facility];  Service: UroGynecology           MT LAPS TOTAL HYSTERECT 250 GM/< W/RMVL TUBE/OVARY N/A 3/1/2024    Procedure: LTH; BSO; EUA EXCISION OF VULVAR CYST;  Surgeon: Mac Rutherford MD;  [facility]: [facility];  Service: UroGynecology           MT SLING OPERATION STRESS INCONTINENCE N/A 3/1/2024    Procedure: PV SLING;  Surgeon: Mac Rutherford MD;  Location: [facility];  Service: UroGynecology            Family History   Problem Relation Age of Onset    Ovarian cancer Paternal Aunt 60    Pancreatic cancer Paternal Aunt 78    Colon cancer Cousin 50        mets to lung    Lung cancer Cousin 50        mets from colon cancer    Diabetes Mother     Heart disease Father     Diabetes Father     No Known Problems Son     No Known Problems Son      Social History     Tobacco Use    Smoking status: Never    Smokeless tobacco: Never   Vaping Use    Vaping status: Never Used   Substance and Sexual Activity    Alcohol use: Yes     Comment: social    Drug use: Never    Sexual activity: Yes     Partners: Male     Birth control/protection: Post-menopausal  "    Current Outpatient Medications on File Prior to Visit   Medication Sig    atorvastatin (LIPITOR) 10 mg tablet TAKE 1 TABLET BY MOUTH EVERY DAY    Calcium 200 MG TABS Calcium    Cholecalciferol (Vitamin D3) 50 MCG (2000 UT) capsule Vitamin D3    Coenzyme Q10 (CoQ-10) 100 MG capsule 200 mg    fluticasone (FLONASE) 50 mcg/act nasal spray 1 spray into each nostril if needed for rhinitis    Multiple Vitamins-Minerals (Multivitamin & Mineral) LIQD Multivitamin    valsartan (DIOVAN) 160 mg tablet TAKE 1 TABLET BY MOUTH EVERY DAY     Allergies   Allergen Reactions    Grass Extracts [Gramineae Pollens]     Mold Extract [Trichophyton]     Other      Trees  [place] trees     Immunization History   Administered Date(s) Administered    COVID-19 PFIZER VACCINE 0.3 ML IM 03/20/2021, 04/10/2021, 11/19/2021    INFLUENZA 10/19/2016    Influenza Quadrivalent Preservative Free 3 years and older IM 11/05/2014    Influenza Quadrivalent, 6-35 Months IM 11/07/2017    Influenza, recombinant, quadrivalent,injectable, preservative free 09/12/2018, 10/21/2019, 09/29/2020, 09/24/2021    Influenza, seasonal, injectable 10/23/2013    Tdap 11/07/2017     Objective     /76   Pulse 78   Temp 97.6 °F (36.4 °C)   Ht 5' 5\" (1.651 m)   Wt 68 kg (150 lb)   SpO2 97%   BMI 24.96 kg/m²     Physical Exam  Vitals and nursing note reviewed.   Constitutional:       General: She is not in acute distress.     Appearance: Normal appearance. She is normal weight. She is not ill-appearing, toxic-appearing or diaphoretic.      Comments: Pleasant articulate 68-year-old female who is awake alert in no acute distress and oriented x 3   HENT:      Head: Normocephalic and atraumatic.      Right Ear: Tympanic membrane, ear canal and external ear normal. There is no impacted cerumen.      Left Ear: Tympanic membrane, ear canal and external ear normal. There is no impacted cerumen.      Nose: Nose normal. No congestion or rhinorrhea.      Mouth/Throat:      " Mouth: Mucous membranes are moist.      Pharynx: Oropharynx is clear. No oropharyngeal exudate or posterior oropharyngeal erythema.   Eyes:      General: No scleral icterus.        Right eye: No discharge.         Left eye: No discharge.      Extraocular Movements: Extraocular movements intact.      Conjunctiva/sclera: Conjunctivae normal.      Pupils: Pupils are equal, round, and reactive to light.   Neck:      Vascular: No carotid bruit.   Cardiovascular:      Rate and Rhythm: Normal rate and regular rhythm.      Pulses: Normal pulses.      Heart sounds: Normal heart sounds. No murmur heard.     No friction rub. No gallop.   Pulmonary:      Effort: Pulmonary effort is normal. No respiratory distress.      Breath sounds: Normal breath sounds. No stridor. No wheezing, rhonchi or rales.   Chest:      Chest wall: No tenderness.   Abdominal:      General: Abdomen is flat. Bowel sounds are normal. There is no distension.      Palpations: Abdomen is soft. There is no mass.      Tenderness: There is no abdominal tenderness. There is no right CVA tenderness, left CVA tenderness, guarding or rebound.      Hernia: No hernia is present.      Comments: Is complaining of some deep discomfort to the left lower quadrant of the abdomen but on also quotation and evaluation, palpation no gross abnormalities were felt.  There is no pain in the groin area with internal and external rotation of her left hip   Musculoskeletal:         General: No swelling, tenderness, deformity or signs of injury. Normal range of motion.      Cervical back: Normal range of motion and neck supple. No rigidity or tenderness.      Right lower leg: No edema.      Left lower leg: No edema.   Lymphadenopathy:      Cervical: No cervical adenopathy.   Skin:     General: Skin is warm and dry.      Capillary Refill: Capillary refill takes less than 2 seconds.      Coloration: Skin is not jaundiced or pale.      Findings: Rash present. No bruising, erythema or  lesion.      Comments: Urticarial rashes noted   Neurological:      General: No focal deficit present.      Mental Status: She is alert and oriented to person, place, and time. Mental status is at baseline.      Cranial Nerves: No cranial nerve deficit.      Sensory: No sensory deficit.      Motor: No weakness.      Coordination: Coordination normal.      Gait: Gait normal.      Deep Tendon Reflexes: Reflexes normal.   Psychiatric:         Mood and Affect: Mood normal.         Behavior: Behavior normal.         Thought Content: Thought content normal.         Judgment: Judgment normal.

## 2024-07-10 NOTE — ASSESSMENT & PLAN NOTE
History of hyperlipidemia.  Patient remains on atorvastatin 10 mg daily.  Again patient states she does try to watch her intake of fats and cholesterol with her diet but admits she is not always perfect.  Check a lipid profile prior to her next visit and make adjustments to medication if needed.

## 2024-07-10 NOTE — ASSESSMENT & PLAN NOTE
Has developed a recurrent skin rash.  This is to her area of her right breast also the area of her abdomen on the left and left hip.  She states she gets this type of rash recurrently especially during the summer months.  She was seen in urgent care facility previously the rash above her left eye and this was treated with oral steroids.  We are going to try to treat her with oral antihistamines either Zyrtec Benadryl Claritin or Allegra.  We are changing her detergent that she is using in her wash changing to all Free to see with that this makes a difference.  She does not use any caustic soap at home for bathing.  If not improving she will let us know and would consider placing patient again on steroids for short period of time.

## 2024-07-18 ENCOUNTER — HOSPITAL ENCOUNTER (OUTPATIENT)
Dept: RADIOLOGY | Age: 69
Discharge: HOME | End: 2024-07-18
Payer: COMMERCIAL

## 2024-07-18 VITALS — BODY MASS INDEX: 24.66 KG/M2 | HEIGHT: 65 IN | WEIGHT: 148 LBS

## 2024-07-18 DIAGNOSIS — Z12.31 ENCOUNTER FOR SCREENING MAMMOGRAM FOR MALIGNANT NEOPLASM OF BREAST: ICD-10-CM

## 2024-07-18 PROCEDURE — 77067 SCR MAMMO BI INCL CAD: CPT

## 2024-07-18 PROCEDURE — 77063 BREAST TOMOSYNTHESIS BI: CPT

## 2024-08-09 PROBLEM — Z00.00 HEALTHCARE MAINTENANCE: Status: RESOLVED | Noted: 2018-02-28 | Resolved: 2024-08-09

## 2024-10-04 DIAGNOSIS — I10 ESSENTIAL HYPERTENSION: ICD-10-CM

## 2024-10-04 RX ORDER — VALSARTAN 160 MG/1
TABLET ORAL
Qty: 90 TABLET | Refills: 1 | Status: SHIPPED | OUTPATIENT
Start: 2024-10-04

## 2024-11-04 ENCOUNTER — RA CDI HCC (OUTPATIENT)
Dept: OTHER | Facility: HOSPITAL | Age: 69
End: 2024-11-04

## 2024-11-04 PROBLEM — Z86.16 HISTORY OF COVID-19: Status: ACTIVE | Noted: 2021-10-08

## 2024-11-04 PROBLEM — Z86.16 HISTORY OF COVID-19: Status: ACTIVE | Noted: 2022-01-04

## 2024-11-04 PROBLEM — Z86.16 HISTORY OF COVID-19: Status: ACTIVE | Noted: 2024-11-04

## 2024-11-06 ENCOUNTER — APPOINTMENT (OUTPATIENT)
Dept: LAB | Facility: CLINIC | Age: 69
End: 2024-11-06
Payer: COMMERCIAL

## 2024-11-06 DIAGNOSIS — Z00.00 HEALTHCARE MAINTENANCE: ICD-10-CM

## 2024-11-06 DIAGNOSIS — R73.9 HYPERGLYCEMIA: ICD-10-CM

## 2024-11-06 LAB
ALBUMIN SERPL BCG-MCNC: 4 G/DL (ref 3.5–5)
ALP SERPL-CCNC: 53 U/L (ref 34–104)
ALT SERPL W P-5'-P-CCNC: 28 U/L (ref 7–52)
ANION GAP SERPL CALCULATED.3IONS-SCNC: 5 MMOL/L (ref 4–13)
AST SERPL W P-5'-P-CCNC: 19 U/L (ref 13–39)
BACTERIA UR QL AUTO: ABNORMAL /HPF
BASOPHILS # BLD AUTO: 0.06 THOUSANDS/ÂΜL (ref 0–0.1)
BASOPHILS NFR BLD AUTO: 1 % (ref 0–1)
BILIRUB SERPL-MCNC: 0.73 MG/DL (ref 0.2–1)
BILIRUB UR QL STRIP: NEGATIVE
BUN SERPL-MCNC: 20 MG/DL (ref 5–25)
CALCIUM SERPL-MCNC: 9.6 MG/DL (ref 8.4–10.2)
CHLORIDE SERPL-SCNC: 105 MMOL/L (ref 96–108)
CHOLEST SERPL-MCNC: 176 MG/DL
CLARITY UR: CLEAR
CO2 SERPL-SCNC: 29 MMOL/L (ref 21–32)
COLOR UR: ABNORMAL
CREAT SERPL-MCNC: 0.95 MG/DL (ref 0.6–1.3)
EOSINOPHIL # BLD AUTO: 0.29 THOUSAND/ÂΜL (ref 0–0.61)
EOSINOPHIL NFR BLD AUTO: 5 % (ref 0–6)
ERYTHROCYTE [DISTWIDTH] IN BLOOD BY AUTOMATED COUNT: 11.8 % (ref 11.6–15.1)
EST. AVERAGE GLUCOSE BLD GHB EST-MCNC: 123 MG/DL
GFR SERPL CREATININE-BSD FRML MDRD: 62 ML/MIN/1.73SQ M
GLUCOSE P FAST SERPL-MCNC: 114 MG/DL (ref 65–99)
GLUCOSE UR STRIP-MCNC: NEGATIVE MG/DL
HBA1C MFR BLD: 5.9 %
HCT VFR BLD AUTO: 39.5 % (ref 34.8–46.1)
HDLC SERPL-MCNC: 45 MG/DL
HGB BLD-MCNC: 13.4 G/DL (ref 11.5–15.4)
HGB UR QL STRIP.AUTO: NEGATIVE
IMM GRANULOCYTES # BLD AUTO: 0.01 THOUSAND/UL (ref 0–0.2)
IMM GRANULOCYTES NFR BLD AUTO: 0 % (ref 0–2)
KETONES UR STRIP-MCNC: NEGATIVE MG/DL
LDLC SERPL CALC-MCNC: 96 MG/DL (ref 0–100)
LEUKOCYTE ESTERASE UR QL STRIP: ABNORMAL
LYMPHOCYTES # BLD AUTO: 2.45 THOUSANDS/ÂΜL (ref 0.6–4.47)
LYMPHOCYTES NFR BLD AUTO: 41 % (ref 14–44)
MCH RBC QN AUTO: 31.2 PG (ref 26.8–34.3)
MCHC RBC AUTO-ENTMCNC: 33.9 G/DL (ref 31.4–37.4)
MCV RBC AUTO: 92 FL (ref 82–98)
MONOCYTES # BLD AUTO: 0.56 THOUSAND/ÂΜL (ref 0.17–1.22)
MONOCYTES NFR BLD AUTO: 9 % (ref 4–12)
NEUTROPHILS # BLD AUTO: 2.68 THOUSANDS/ÂΜL (ref 1.85–7.62)
NEUTS SEG NFR BLD AUTO: 44 % (ref 43–75)
NITRITE UR QL STRIP: NEGATIVE
NON-SQ EPI CELLS URNS QL MICRO: ABNORMAL /HPF
NONHDLC SERPL-MCNC: 131 MG/DL
NRBC BLD AUTO-RTO: 0 /100 WBCS
PH UR STRIP.AUTO: 6 [PH]
PLATELET # BLD AUTO: 289 THOUSANDS/UL (ref 149–390)
PMV BLD AUTO: 9.5 FL (ref 8.9–12.7)
POTASSIUM SERPL-SCNC: 4.5 MMOL/L (ref 3.5–5.3)
PROT SERPL-MCNC: 6.4 G/DL (ref 6.4–8.4)
PROT UR STRIP-MCNC: NEGATIVE MG/DL
RBC # BLD AUTO: 4.3 MILLION/UL (ref 3.81–5.12)
RBC #/AREA URNS AUTO: ABNORMAL /HPF
SODIUM SERPL-SCNC: 139 MMOL/L (ref 135–147)
SP GR UR STRIP.AUTO: 1.02 (ref 1–1.03)
TRIGL SERPL-MCNC: 175 MG/DL
UROBILINOGEN UR STRIP-ACNC: <2 MG/DL
WBC # BLD AUTO: 6.05 THOUSAND/UL (ref 4.31–10.16)
WBC #/AREA URNS AUTO: ABNORMAL /HPF

## 2024-11-06 PROCEDURE — 36415 COLL VENOUS BLD VENIPUNCTURE: CPT

## 2024-11-06 PROCEDURE — 85025 COMPLETE CBC W/AUTO DIFF WBC: CPT

## 2024-11-06 PROCEDURE — 80053 COMPREHEN METABOLIC PANEL: CPT

## 2024-11-06 PROCEDURE — 81001 URINALYSIS AUTO W/SCOPE: CPT

## 2024-11-06 PROCEDURE — 83036 HEMOGLOBIN GLYCOSYLATED A1C: CPT

## 2024-11-06 PROCEDURE — 80061 LIPID PANEL: CPT

## 2024-11-11 ENCOUNTER — OFFICE VISIT (OUTPATIENT)
Age: 69
End: 2024-11-11
Payer: COMMERCIAL

## 2024-11-11 VITALS
RESPIRATION RATE: 16 BRPM | TEMPERATURE: 98.8 F | SYSTOLIC BLOOD PRESSURE: 148 MMHG | WEIGHT: 152 LBS | DIASTOLIC BLOOD PRESSURE: 82 MMHG | HEART RATE: 82 BPM | BODY MASS INDEX: 25.33 KG/M2 | OXYGEN SATURATION: 99 % | HEIGHT: 65 IN

## 2024-11-11 DIAGNOSIS — E78.01 FAMILIAL HYPERCHOLESTEROLEMIA: ICD-10-CM

## 2024-11-11 DIAGNOSIS — Z00.00 MEDICARE ANNUAL WELLNESS VISIT, SUBSEQUENT: ICD-10-CM

## 2024-11-11 DIAGNOSIS — R73.9 HYPERGLYCEMIA: Primary | ICD-10-CM

## 2024-11-11 DIAGNOSIS — I10 PRIMARY HYPERTENSION: ICD-10-CM

## 2024-11-11 DIAGNOSIS — K21.9 GASTROESOPHAGEAL REFLUX DISEASE, UNSPECIFIED WHETHER ESOPHAGITIS PRESENT: ICD-10-CM

## 2024-11-11 PROCEDURE — 99214 OFFICE O/P EST MOD 30 MIN: CPT | Performed by: INTERNAL MEDICINE

## 2024-11-11 PROCEDURE — G0439 PPPS, SUBSEQ VISIT: HCPCS | Performed by: INTERNAL MEDICINE

## 2024-11-11 NOTE — ASSESSMENT & PLAN NOTE
History of hyperlipidemia.  Patient remains on atorvastatin 10 mg daily.  She states she tries to watch her intake of fats and cholesterol with her diet but is not always perfect.  Patient will have a lipid profile performed again in approximately 6 months and we will make adjustment medication if needed.

## 2024-11-11 NOTE — ASSESSMENT & PLAN NOTE
Patient does have a longstanding history of hypertension.  Her blood pressure initially was elevated today in the office from her baseline.  Patient states that she was rushing to get to the office and just left her grandchild.  She will continue present medication surveillance and we will make a point when she returns to the office with her mother in a few weeks to check blood pressure again.    Orders:    Basic metabolic panel; Future

## 2024-11-11 NOTE — PROGRESS NOTES
"Answers submitted by the patient for this visit:  Medicare Annual Wellness Visit (Submitted on 11/6/2024)  How would you rate your overall health?: excellent  Compared to last year, how is your physical health?: same  In general, how satisfied are you with your life?: very satisfied  Compared to last year, how is your eyesight?: same  Compared to last year, how is your hearing?: same  Compared to last year, how is your emotional/mental health?: same  How often is anger a problem for you?: never, rarely  How often do you feel unusually tired/fatigued?: sometimes  In the past 7 days, how much pain have you experienced?: some  If you answered \"some\" or \"a lot\", please rate the severity of your pain on a scale of 1 to 10 (1 being the least severe pain and 10 being the most intense pain).: 4/10  In the past 6 months, have you lost or gained 10 pounds without trying?: No  One or more falls in the last year: No  In the past 6 months, have you accidentally leaked urine?: No  Do you have trouble with the stairs inside or outside your home?: No  Does your home have working smoke alarms?: Yes  Does your home have a carbon monoxide monitor?: Yes  Which safety hazards (if any) have you experienced in your home? Please select all that apply.: none  How would you describe your current diet? Please select all that apply.: Low Cholesterol, Limited junk food  In addition to prescription medications, are you taking any over-the-counter supplements?: Yes  If yes, what supplements are you taking?: Calcium, multivitamin, CoQ10  Can you manage your medications?: Yes  Are you currently taking any opioid medications?: No  Can you walk and transfer into and out of your bed and chair?: Yes  Can you dress and groom yourself?: Yes  Can you bathe or shower yourself?: Yes  Can you feed yourself?: Yes  Can you do your laundry/ housekeeping?: Yes  Can you manage your money, pay your bills, and track your expenses?: Yes  Can you make your own " meals?: Yes  Can you do your own shopping?: Yes  Within the last 12 months, have you had any hospitalizations or Emergency Department visits?: Yes  If yes, how many times have you been hospitalized within the past year?: 1-2  Additional Comments: Hysterectomy/bladder sling, 2024  Do you have a living will?: No  Do you have a Durable POA (Power of ) for healthcare decisions?: No  Do you have an Advanced Directive for end of life decisions?: No  How often have you used an illegal drug (including marijuana) or a prescription medication for non-medical reasons in the past year?: never  What is the typical number of drinks you consume in a day?: 0  What is the typical number of drinks you consume in a week?: 2  How often did you have a drink containing alcohol in the past year?: 2 to 4 times a month  How many drinks did you have on a typical day  when you were drinking in the past year?: 1 to 2  How often did you have 6 or more drinks on one occasion in the past year?: never  Ambulatory Visit  Name: Sagrario Potts      : 1955      MRN: [id number]  Encounter Provider: Diego Eastman DO  Encounter Date: 2024   Encounter department: [facility]    Assessment & Plan  Hyperglycemia  History of hyperglycemia, strong family history of diabetes mellitus type 2 on both maternal and paternal side of the family.  We discussed again as in the past the importance of watching her intake of concentrated sweets and simple carbohydrates and calories overall.  We will initiate treatment in the future if indicated    Orders:    Hemoglobin A1C; Future    Primary hypertension  Patient does have a longstanding history of hypertension.  Her blood pressure initially was elevated today in the office from her baseline.  Patient states that she was rushing to get to the office and just left her grandchild.  She will continue present medication surveillance and we will make a point when she  returns to the office with her mother in a few weeks to check blood pressure again.    Orders:    Basic metabolic panel; Future    Medicare annual wellness visit, subsequent  69-year-old female history of medical problems including hypertension, hyperglycemia, hyperlipidemia who is here today for repeat Medicare wellness visit.  Patient states she is doing well and is somewhat frustrated because she is attempting to lose weight but has been unsuccessful.  Denies any chest pain or pressure no increasing shortness of breath.  He is busy taking care of her grandchildren         Familial hypercholesterolemia  History of hyperlipidemia.  Patient remains on atorvastatin 10 mg daily.  She states she tries to watch her intake of fats and cholesterol with her diet but is not always perfect.  Patient will have a lipid profile performed again in approximately 6 months and we will make adjustment medication if needed.         Gastroesophageal reflux disease, unspecified whether esophagitis present  Patient remains off of all medication.  Call if any acute recurrence of symptoms.              History of Present Illness     Patient is a 69-year-old female who history of multiple medical problems outlined previously who is here today for repeat Medicare wellness visit.  Patient did have labs performed prior to visit today and we did discuss the results at length with the patient.  Patient relates she is doing relatively well but is extremely busy taking care of her grandchildren no new specific complaints today      Review of Systems   Constitutional: Negative.    HENT: Negative.     Eyes: Negative.    Respiratory: Negative.     Cardiovascular: Negative.    Gastrointestinal: Negative.    Endocrine: Negative.    Genitourinary: Negative.    Musculoskeletal: Negative.    Skin: Negative.    Allergic/Immunologic: Negative.    Neurological: Negative.    Hematological: Negative.    Psychiatric/Behavioral: Negative.       Past Medical  History:   Diagnosis Date    GERD (gastroesophageal reflux disease)     Hyperlipemia     Hypertension     PONV (postoperative nausea and vomiting)      Past Surgical History:   Procedure Laterality Date     SECTION      COLONOSCOPY  2020    Dr Petersen , 5 yr f/u     COLPOPEXY SACRAL LAPAROSCOPIC N/A 3/1/2024    Procedure: ROBOTIC COLPOPEXY SACRAL;  Surgeon: Mac Rutherford MD;  Location: [facility];  Service: UroGynecology           HYSTEROSCOPY W/ ENDOMETRIAL ABLATION      WI CYSTOURETHROSCOPY N/A 3/1/2024    Procedure: CYSTO;  Surgeon: Mac Rutherford MD;  Location: [facility];  Service: UroGynecology           WI LAPS TOTAL HYSTERECT 250 GM/< W/RMVL TUBE/OVARY N/A 3/1/2024    Procedure: LTH; BSO; EUA EXCISION OF VULVAR CYST;  Surgeon: Mac Rutherford MD;  Location: [facility];  Service: UroGynecology           WI SLING OPERATION STRESS INCONTINENCE N/A 3/1/2024    Procedure: PV SLING;  Surgeon: Mac Rutherford MD;  Location: [facility];  Service: UroGynecology            Family History   Problem Relation Age of Onset    Ovarian cancer Paternal Aunt 60    Pancreatic cancer Paternal Aunt 78    Colon cancer Cousin 50        mets to lung    Lung cancer Cousin 50        mets from colon cancer    Diabetes Mother     Heart disease Father     Diabetes Father     No Known Problems Son     No Known Problems Son      Social History     Tobacco Use    Smoking status: Never    Smokeless tobacco: Never   Vaping Use    Vaping status: Never Used   Substance and Sexual Activity    Alcohol use: Yes     Comment: social    Drug use: Never    Sexual activity: Yes     Partners: Male     Birth control/protection: Post-menopausal     Current Outpatient Medications on File Prior to Visit   Medication Sig    atorvastatin (LIPITOR) 10 mg tablet TAKE 1 TABLET BY MOUTH EVERY DAY    Calcium 200 MG TABS Calcium    Coenzyme Q10 (CoQ-10) 100 MG capsule 200 mg    fluticasone (FLONASE) 50  "mcg/act nasal spray 1 spray into each nostril if needed for rhinitis    Multiple Vitamins-Minerals (Multivitamin & Mineral) LIQD Multivitamin    valsartan (DIOVAN) 160 mg tablet TAKE 1 TABLET BY MOUTH EVERY DAY    Cholecalciferol (Vitamin D3) 50 MCG (2000 UT) capsule Vitamin D3     Allergies   Allergen Reactions    Grass Extracts [Gramineae Pollens]     Mold Extract [Trichophyton]     Other      Trees  Rector trees     Immunization History   Administered Date(s) Administered    COVID-19 PFIZER VACCINE 0.3 ML IM 03/20/2021, 04/10/2021, 11/19/2021    INFLUENZA 10/27/2015, 10/19/2016, 10/24/2022, 10/11/2023    Influenza Quadrivalent Preservative Free 3 years and older IM 11/05/2014    Influenza Quadrivalent, 6-35 Months IM 11/07/2017    Influenza, recombinant, quadrivalent,injectable, preservative free 09/12/2018, 10/21/2019, 09/29/2020, 09/24/2021    Influenza, seasonal, injectable 10/23/2013    Tdap 11/07/2017, 05/23/2024    Zoster Vaccine Recombinant 10/29/2024    influenza, trivalent, adjuvanted 10/11/2024     Objective     /82   Pulse 82   Temp 98.8 °F (37.1 °C)   Resp 16   Ht 5' 5\" (1.651 m)   Wt 68.9 kg (152 lb)   SpO2 99%   BMI 25.29 kg/m²     Physical Exam  Vitals and nursing note reviewed.   Constitutional:       General: She is not in acute distress.     Appearance: Normal appearance. She is normal weight. She is not ill-appearing, toxic-appearing or diaphoretic.      Comments: Pleasant articulate relatively healthy appearing but overweight 69-year-old female who is awake alert in no acute distress and oriented x 3   HENT:      Head: Normocephalic and atraumatic.      Right Ear: Tympanic membrane, ear canal and external ear normal. There is no impacted cerumen.      Left Ear: Tympanic membrane, ear canal and external ear normal. There is no impacted cerumen.      Nose: Nose normal. No congestion or rhinorrhea.      Mouth/Throat:      Mouth: Mucous membranes are moist.      Pharynx: Oropharynx is " clear. No oropharyngeal exudate or posterior oropharyngeal erythema.   Eyes:      General: No scleral icterus.        Right eye: No discharge.         Left eye: No discharge.      Extraocular Movements: Extraocular movements intact.      Conjunctiva/sclera: Conjunctivae normal.      Pupils: Pupils are equal, round, and reactive to light.   Neck:      Vascular: No carotid bruit.   Cardiovascular:      Rate and Rhythm: Normal rate and regular rhythm.      Pulses: Normal pulses.      Heart sounds: Normal heart sounds. No murmur heard.     No friction rub. No gallop.   Pulmonary:      Effort: Pulmonary effort is normal. No respiratory distress.      Breath sounds: Normal breath sounds. No stridor. No wheezing, rhonchi or rales.   Chest:      Chest wall: No tenderness.   Abdominal:      General: Abdomen is flat. Bowel sounds are normal. There is no distension.      Palpations: Abdomen is soft. There is no mass.      Tenderness: There is no abdominal tenderness. There is no right CVA tenderness, left CVA tenderness, guarding or rebound.      Hernia: No hernia is present.   Musculoskeletal:         General: No swelling, tenderness, deformity or signs of injury. Normal range of motion.      Cervical back: Normal range of motion and neck supple. No rigidity or tenderness.      Right lower leg: No edema.      Left lower leg: No edema.   Lymphadenopathy:      Cervical: No cervical adenopathy.   Skin:     General: Skin is warm and dry.      Capillary Refill: Capillary refill takes less than 2 seconds.      Coloration: Skin is not jaundiced or pale.      Findings: No bruising, erythema, lesion or rash.   Neurological:      General: No focal deficit present.      Mental Status: She is alert and oriented to person, place, and time. Mental status is at baseline.      Cranial Nerves: No cranial nerve deficit.      Sensory: No sensory deficit.      Motor: No weakness.      Coordination: Coordination normal.      Gait: Gait normal.       Deep Tendon Reflexes: Reflexes normal.   Psychiatric:         Mood and Affect: Mood normal.         Behavior: Behavior normal.         Thought Content: Thought content normal.         Judgment: Judgment normal.

## 2024-11-11 NOTE — ASSESSMENT & PLAN NOTE
69-year-old female history of medical problems including hypertension, hyperglycemia, hyperlipidemia who is here today for repeat Medicare wellness visit.  Patient states she is doing well and is somewhat frustrated because she is attempting to lose weight but has been unsuccessful.  Denies any chest pain or pressure no increasing shortness of breath.  He is busy taking care of her grandchildren

## 2024-11-11 NOTE — ASSESSMENT & PLAN NOTE
History of hyperglycemia, strong family history of diabetes mellitus type 2 on both maternal and paternal side of the family.  We discussed again as in the past the importance of watching her intake of concentrated sweets and simple carbohydrates and calories overall.  We will initiate treatment in the future if indicated    Orders:    Hemoglobin A1C; Future

## 2024-12-11 PROBLEM — Z00.00 MEDICARE ANNUAL WELLNESS VISIT, SUBSEQUENT: Status: RESOLVED | Noted: 2018-02-28 | Resolved: 2024-12-11

## 2024-12-15 DIAGNOSIS — E78.01 FAMILIAL HYPERCHOLESTEROLEMIA: ICD-10-CM

## 2024-12-17 RX ORDER — ATORVASTATIN CALCIUM 10 MG/1
10 TABLET, FILM COATED ORAL DAILY
Qty: 90 TABLET | Refills: 1 | Status: SHIPPED | OUTPATIENT
Start: 2024-12-17

## 2025-03-03 ENCOUNTER — RA CDI HCC (OUTPATIENT)
Dept: OTHER | Facility: HOSPITAL | Age: 70
End: 2025-03-03

## 2025-03-03 NOTE — PROGRESS NOTES
HCC coding opportunities       Chart reviewed, no opportunity found: CHART REVIEWED, NO OPPORTUNITY FOUND      This is a reminder to address (resolve/update/assess) ALL HCC (risk adjustment) codes as found on active problem list for 2025 as patient scores reset to zero [facility].  Patients Insurance     Medicare Insurance: [employer]

## 2025-03-05 ENCOUNTER — APPOINTMENT (OUTPATIENT)
Dept: LAB | Facility: AMBULARY SURGERY CENTER | Age: 70
End: 2025-03-05
Payer: COMMERCIAL

## 2025-03-05 DIAGNOSIS — I10 PRIMARY HYPERTENSION: ICD-10-CM

## 2025-03-05 DIAGNOSIS — R73.9 HYPERGLYCEMIA: ICD-10-CM

## 2025-03-05 LAB
ANION GAP SERPL CALCULATED.3IONS-SCNC: 3 MMOL/L (ref 4–13)
BUN SERPL-MCNC: 22 MG/DL (ref 5–25)
CALCIUM SERPL-MCNC: 9.3 MG/DL (ref 8.4–10.2)
CHLORIDE SERPL-SCNC: 108 MMOL/L (ref 96–108)
CO2 SERPL-SCNC: 28 MMOL/L (ref 21–32)
CREAT SERPL-MCNC: 0.81 MG/DL (ref 0.6–1.3)
EST. AVERAGE GLUCOSE BLD GHB EST-MCNC: 126 MG/DL
GFR SERPL CREATININE-BSD FRML MDRD: 75 ML/MIN/1.73SQ M
GLUCOSE P FAST SERPL-MCNC: 102 MG/DL (ref 65–99)
HBA1C MFR BLD: 6 %
POTASSIUM SERPL-SCNC: 4.1 MMOL/L (ref 3.5–5.3)
SODIUM SERPL-SCNC: 139 MMOL/L (ref 135–147)

## 2025-03-05 PROCEDURE — 83036 HEMOGLOBIN GLYCOSYLATED A1C: CPT

## 2025-03-05 PROCEDURE — 80048 BASIC METABOLIC PNL TOTAL CA: CPT

## 2025-03-05 PROCEDURE — 36415 COLL VENOUS BLD VENIPUNCTURE: CPT

## 2025-03-07 ENCOUNTER — OFFICE VISIT (OUTPATIENT)
Age: 70
End: 2025-03-07
Payer: COMMERCIAL

## 2025-03-07 VITALS
DIASTOLIC BLOOD PRESSURE: 85 MMHG | TEMPERATURE: 98 F | BODY MASS INDEX: 24.99 KG/M2 | HEIGHT: 65 IN | SYSTOLIC BLOOD PRESSURE: 142 MMHG | OXYGEN SATURATION: 98 % | WEIGHT: 150 LBS | HEART RATE: 82 BPM | RESPIRATION RATE: 16 BRPM

## 2025-03-07 DIAGNOSIS — R09.81 SINUS CONGESTION: ICD-10-CM

## 2025-03-07 DIAGNOSIS — I10 PRIMARY HYPERTENSION: ICD-10-CM

## 2025-03-07 DIAGNOSIS — J01.01 ACUTE RECURRENT MAXILLARY SINUSITIS: Primary | ICD-10-CM

## 2025-03-07 PROCEDURE — 87811 SARS-COV-2 COVID19 W/OPTIC: CPT | Performed by: INTERNAL MEDICINE

## 2025-03-07 PROCEDURE — 99214 OFFICE O/P EST MOD 30 MIN: CPT | Performed by: INTERNAL MEDICINE

## 2025-03-07 PROCEDURE — G2211 COMPLEX E/M VISIT ADD ON: HCPCS | Performed by: INTERNAL MEDICINE

## 2025-03-07 PROCEDURE — 87804 INFLUENZA ASSAY W/OPTIC: CPT | Performed by: INTERNAL MEDICINE

## 2025-03-07 RX ORDER — DOXYCYCLINE 100 MG/1
100 CAPSULE ORAL EVERY 12 HOURS SCHEDULED
Qty: 20 CAPSULE | Refills: 0 | Status: SHIPPED | OUTPATIENT
Start: 2025-03-07 | End: 2025-03-17

## 2025-03-07 NOTE — ASSESSMENT & PLAN NOTE
Recent blood pressure readings have been running a little high as noted with her last visit and then again today.  Will check this with her next visit and along with his we will be checking her renal function.  We will make some adjustment medication if needed

## 2025-03-07 NOTE — ASSESSMENT & PLAN NOTE
69-year-old female history of multiple medical problems outlined previously who is here today for acute evaluation.  States that she has been ill for the past 3 to 4 days with nasal congestion sore throat, maxillary sinus pressure on the left with a slight cough and chest congestion.  States that she is producing copious amount of mucus from the nares which was originally clear and now is discolored greenish to gray in coloration.  States that she is a little fatigued.  On evaluation patient does have diffuse erythematous nasal mucosa with mildly enlarged turbinates and a thick whitish nasal discharge, severe erythema to posterior airway with a thick yellow to greenish postnasal drip.  Slightly decreased breath sounds anterior and posteriorly and maneuvers did elicit a cough with the patient.  Complaining of again some maxillary sinus tenderness.  Diagnosis is upper respiratory tract infection, sinusitis.  Patient will be placed on antibiotics specifically doxycycline 100 mg p.o. twice daily for 7 to 10 days.  Patient was also instructed to restart using some saline nasal spray and Flonase nasal spray to reduce the irritation inflammation and mobilize her nasal discharge, taking Mucinex DM to break up the mucus.  To call on Monday if not improving states that she needs to be well by later in the week for a family wedding        Orders:    doxycycline hyclate (VIBRAMYCIN) 100 mg capsule; Take 1 capsule (100 mg total) by mouth every 12 (twelve) hours for 10 days

## 2025-03-07 NOTE — PROGRESS NOTES
Name: Sagrario Potts      : 1955      MRN: [id number]  Encounter Provider: Diego Eastman DO  Encounter Date: 3/7/2025   Encounter department: [facility] INTERNAL MEDICINE    Assessment & Plan  Sinus congestion    Orders:  •  POCT Rapid Covid Ag  •  POCT rapid flu A and B    Acute recurrent maxillary sinusitis  69-year-old female history of multiple medical problems outlined previously who is here today for acute evaluation.  States that she has been ill for the past 3 to 4 days with nasal congestion sore throat, maxillary sinus pressure on the left with a slight cough and chest congestion.  States that she is producing copious amount of mucus from the nares which was originally clear and now is discolored greenish to gray in coloration.  States that she is a little fatigued.  On evaluation patient does have diffuse erythematous nasal mucosa with mildly enlarged turbinates and a thick whitish nasal discharge, severe erythema to posterior airway with a thick yellow to greenish postnasal drip.  Slightly decreased breath sounds anterior and posteriorly and maneuvers did elicit a cough with the patient.  Complaining of again some maxillary sinus tenderness.  Diagnosis is upper respiratory tract infection, sinusitis.  Patient will be placed on antibiotics specifically doxycycline 100 mg p.o. twice daily for 7 to 10 days.  Patient was also instructed to restart using some saline nasal spray and Flonase nasal spray to reduce the irritation inflammation and mobilize her nasal discharge, taking Mucinex DM to break up the mucus.  To call on Monday if not improving states that she needs to be well by later in the week for a family wedding        Orders:  •  doxycycline hyclate (VIBRAMYCIN) 100 mg capsule; Take 1 capsule (100 mg total) by mouth every 12 (twelve) hours for 10 days    Primary hypertension  Recent blood pressure readings have been running a little high as noted with her last visit and then  again today.  Will check this with her next visit and along with his we will be checking her renal function.  We will make some adjustment medication if needed              History of Present Illness     Patient is a 69-year-old female history of medical problems outlined previously who is here today for an acute evaluation.  Patient relates that she has been ill with upper respiratory tract symptoms for the past 3 to 4 days, nasal congestion slight sore throat postnasal drip, pressure in maxillary area of sinuses on the left.  Some slight irritation also and fullness in her left ear.  Patient along with his has a cough which she states is nonproductive.  Review of Systems   Constitutional:  Positive for activity change and fatigue. Negative for appetite change, chills, diaphoresis, fever and unexpected weight change.   HENT:  Positive for congestion, ear pain, postnasal drip, rhinorrhea and sinus pain. Negative for dental problem, drooling, ear discharge, facial swelling, hearing loss, mouth sores, nosebleeds, sinus pressure, sneezing, sore throat, tinnitus, trouble swallowing and voice change.    Eyes: Negative.    Respiratory:  Positive for cough. Negative for apnea, choking, chest tightness, shortness of breath, wheezing and stridor.    Cardiovascular: Negative.    Gastrointestinal: Negative.    Endocrine: Negative.    Genitourinary: Negative.    Musculoskeletal: Negative.    Skin: Negative.    Allergic/Immunologic: Negative.    Neurological: Negative.    Hematological: Negative.    Psychiatric/Behavioral: Negative.          Some mild anxiety once to be well prior to upcoming wedding next week   Past Medical History:   Diagnosis Date   • GERD (gastroesophageal reflux disease)    • Hyperlipemia    • Hypertension    • PONV (postoperative nausea and vomiting)      Past Surgical History:   Procedure Laterality Date   •  SECTION     • COLONOSCOPY  2020    Dr Petersen , 5 yr f/u    • COLPOPEXY SACRAL  LAPAROSCOPIC N/A 3/1/2024    Procedure: ROBOTIC COLPOPEXY SACRAL;  Surgeon: Mac Rutherford MD;  Location: [facility];  Service: UroGynecology          • HYSTEROSCOPY W/ ENDOMETRIAL ABLATION     • IA CYSTOURETHROSCOPY N/A 3/1/2024    Procedure: CYSTO;  Surgeon: Mac Rutherford MD;  Location[facility];  Service: UroGynecology          • IA LAPS TOTAL HYSTERECT 250 GM/< W/RMVL TUBE/OVARY N/A 3/1/2024    Procedure: LTH; BSO; EUA EXCISION OF VULVAR CYST;  Surgeon: Mac Rutherford MD;  Location[facility];  Service: UroGynecology          • IA SLING OPERATION STRESS INCONTINENCE N/A 3/1/2024    Procedure: PV SLING;  Surgeon: Mac Rutherford MD;  Location[facility];  Service: UroGynecology            Family History   Problem Relation Age of Onset   • Ovarian cancer Paternal Aunt 60   • Pancreatic cancer Paternal Aunt 78   • Colon cancer Cousin 50        mets to lung   • Lung cancer Cousin 50        mets from colon cancer   • Diabetes Mother    • Heart disease Father    • Diabetes Father    • No Known Problems Son    • No Known Problems Son      Social History     Tobacco Use   • Smoking status: Never   • Smokeless tobacco: Never   Vaping Use   • Vaping status: Never Used   Substance and Sexual Activity   • Alcohol use: Yes     Comment: social   • Drug use: Never   • Sexual activity: Yes     Partners: Male     Birth control/protection: Post-menopausal     Current Outpatient Medications on File Prior to Visit   Medication Sig   • atorvastatin (LIPITOR) 10 mg tablet TAKE 1 TABLET BY MOUTH EVERY DAY   • Calcium 200 MG TABS Calcium   • Coenzyme Q10 (CoQ-10) 100 MG capsule 200 mg   • fluticasone (FLONASE) 50 mcg/act nasal spray 1 spray into each nostril if needed for rhinitis   • Multiple Vitamins-Minerals (Multivitamin & Mineral) LIQD Multivitamin   • valsartan (DIOVAN) 160 mg tablet TAKE 1 TABLET BY MOUTH EVERY DAY   • Cholecalciferol (Vitamin D3) 50 MCG (2000 UT) capsule Vitamin  "D3     Allergies   Allergen Reactions   • Grass Extracts [Gramineae Pollens]    • Mold Extract [Trichophyton]    • Other      Trees  [place] trees     Immunization History   Administered Date(s) Administered   • COVID-19 PFIZER VACCINE 0.3 ML IM 03/20/2021, 04/10/2021, 11/19/2021   • INFLUENZA 10/27/2015, 10/19/2016, 10/24/2022, 10/11/2023   • Influenza Quadrivalent Preservative Free 3 years and older IM 11/05/2014   • Influenza Quadrivalent, 6-35 Months IM 11/07/2017   • Influenza, recombinant, quadrivalent,injectable, preservative free 09/12/2018, 10/21/2019, 09/29/2020, 09/24/2021   • Influenza, seasonal, injectable 10/23/2013   • Tdap 11/07/2017, 05/23/2024   • Zoster Vaccine Recombinant 10/29/2024   • influenza, trivalent, adjuvanted 10/11/2024     Objective   /85   Pulse 82   Temp 98 °F (36.7 °C)   Resp 16   Ht 5' 5\" (1.651 m)   Wt 68 kg (150 lb)   SpO2 98%   BMI 24.96 kg/m²     Physical Exam  Vitals and nursing note reviewed.   Constitutional:       General: She is not in acute distress.     Appearance: Normal appearance. She is normal weight. She is not ill-appearing, toxic-appearing or diaphoretic.      Comments: Congested sounding 69-year-old female who is awake alert.  Patient did have testing for both influenza and COVID today in the office which were negative.   HENT:      Head: Normocephalic and atraumatic.      Right Ear: Tympanic membrane, ear canal and external ear normal. There is no impacted cerumen.      Left Ear: Ear canal and external ear normal. There is no impacted cerumen.      Ears:      Comments: Slight injection no bulging of the tympanic membrane on the left     Nose: Congestion and rhinorrhea present.      Comments: Mild diffuse erythematous nasal mucosa with a thick whitish nasal discharge     Mouth/Throat:      Mouth: Mucous membranes are moist.      Pharynx: Oropharyngeal exudate and posterior oropharyngeal erythema present.      Comments: Erythema to posterior airway with a " thick yellow to greenish postnasal drip  Eyes:      General: No scleral icterus.        Right eye: No discharge.         Left eye: No discharge.      Extraocular Movements: Extraocular movements intact.      Conjunctiva/sclera: Conjunctivae normal.      Pupils: Pupils are equal, round, and reactive to light.   Neck:      Vascular: No carotid bruit.   Cardiovascular:      Rate and Rhythm: Normal rate and regular rhythm.      Pulses: Normal pulses.      Heart sounds: Normal heart sounds. No murmur heard.     No friction rub. No gallop.   Pulmonary:      Effort: Pulmonary effort is normal. No respiratory distress.      Breath sounds: No stridor. No wheezing, rhonchi or rales.      Comments: Slightly decreased breath sounds anteriorly and posteriorly and respiratory maneuvers did precipitate a cough  Chest:      Chest wall: No tenderness.   Abdominal:      General: Abdomen is flat. Bowel sounds are normal.      Palpations: Abdomen is soft.   Musculoskeletal:         General: Normal range of motion.      Cervical back: Normal range of motion and neck supple. No rigidity or tenderness.   Lymphadenopathy:      Cervical: No cervical adenopathy.   Skin:     General: Skin is warm and dry.   Neurological:      General: No focal deficit present.      Mental Status: She is alert and oriented to person, place, and time. Mental status is at baseline.   Psychiatric:         Mood and Affect: Mood normal.         Behavior: Behavior normal.         Thought Content: Thought content normal.         Judgment: Judgment normal.

## 2025-03-10 LAB
SARS-COV-2 AG UPPER RESP QL IA: NEGATIVE
SL AMB POCT RAPID FLU A: NORMAL
SL AMB POCT RAPID FLU B: NORMAL
VALID CONTROL: NORMAL

## 2025-03-12 ENCOUNTER — OFFICE VISIT (OUTPATIENT)
Age: 70
End: 2025-03-12
Payer: COMMERCIAL

## 2025-03-12 VITALS
HEIGHT: 65 IN | TEMPERATURE: 98.2 F | HEART RATE: 72 BPM | DIASTOLIC BLOOD PRESSURE: 80 MMHG | RESPIRATION RATE: 16 BRPM | OXYGEN SATURATION: 97 % | SYSTOLIC BLOOD PRESSURE: 138 MMHG | WEIGHT: 150 LBS | BODY MASS INDEX: 24.99 KG/M2

## 2025-03-12 DIAGNOSIS — E78.01 FAMILIAL HYPERCHOLESTEROLEMIA: Primary | ICD-10-CM

## 2025-03-12 DIAGNOSIS — M19.049 LOCALIZED, PRIMARY OSTEOARTHRITIS OF HAND, UNSPECIFIED LATERALITY: ICD-10-CM

## 2025-03-12 DIAGNOSIS — J01.01 ACUTE RECURRENT MAXILLARY SINUSITIS: ICD-10-CM

## 2025-03-12 DIAGNOSIS — R73.03 PREDIABETES: ICD-10-CM

## 2025-03-12 DIAGNOSIS — I10 PRIMARY HYPERTENSION: ICD-10-CM

## 2025-03-12 PROCEDURE — G2211 COMPLEX E/M VISIT ADD ON: HCPCS | Performed by: INTERNAL MEDICINE

## 2025-03-12 PROCEDURE — 99214 OFFICE O/P EST MOD 30 MIN: CPT | Performed by: INTERNAL MEDICINE

## 2025-03-12 NOTE — ASSESSMENT & PLAN NOTE
History of hyperlipidemia.  Patient remains on atorvastatin and she is watching her diet but again admits she is not always perfect.  Will check a lipid profile with her next visit and make adjustment medication if needed.  Again was told the importance of watching her intake of fats and cholesterol.    Orders:    Lipid panel; Future

## 2025-03-12 NOTE — ASSESSMENT & PLAN NOTE
Recently treated and seen in the office for an upper respiratory tract infection, sinusitis.  Was placed on antibiotic treatment and states that she is feeling better.  On evaluation today patient does have some very slight erythema to nasal mucosa with clear nasal discharge, very slight erythema to posterior airway with a clear postnasal drip.  Patient overall is improving and will continue antibiotics till finished.  Will call if not 100% of recurrence of symptoms

## 2025-03-12 NOTE — ASSESSMENT & PLAN NOTE
Longstanding history of hypertension.  Blood pressure is showing adequate control today in the office.  Patient will continue present medication and surveillance.  Check on her renal function with her next visit.    Orders:    Basic metabolic panel; Future

## 2025-03-12 NOTE — ASSESSMENT & PLAN NOTE
Patient's blood sugars very slightly elevated hemoglobin A1c is in the prediabetic range.  I again there is a strong family history of diabetes especially both maternal and paternal side.  Patient states that she tries to watch her diet but admits she is not always perfect.  Will continue to monitor blood sugar readings and initiate medication in the future if needed.    Orders:    Hemoglobin A1C; Future

## 2025-03-12 NOTE — PROGRESS NOTES
Name: Sagrario Potts      : 1955      MRN: [id number]  Encounter Provider: Diego Eastman DO  Encounter Date: 3/12/2025   Encounter department: [facility] INTERNAL MEDICINE    Assessment & Plan  Prediabetes  Patient's blood sugars very slightly elevated hemoglobin A1c is in the prediabetic range.  I again there is a strong family history of diabetes especially both maternal and paternal side.  Patient states that she tries to watch her diet but admits she is not always perfect.  Will continue to monitor blood sugar readings and initiate medication in the future if needed.    Orders:  •  Hemoglobin A1C; Future    Primary hypertension  Longstanding history of hypertension.  Blood pressure is showing adequate control today in the office.  Patient will continue present medication and surveillance.  Check on her renal function with her next visit.    Orders:  •  Basic metabolic panel; Future    Familial hypercholesterolemia  History of hyperlipidemia.  Patient remains on atorvastatin and she is watching her diet but again admits she is not always perfect.  Will check a lipid profile with her next visit and make adjustment medication if needed.  Again was told the importance of watching her intake of fats and cholesterol.    Orders:  •  Lipid panel; Future    Localized, primary osteoarthritis of hand, unspecified laterality  Patient is complaining of some discomfort at the base of her left hand just above her wrist.  No numbness or tingling with palpation, negative Tinel's sign and no weakness.  Patient will keep us informed of any changes.         Acute recurrent maxillary sinusitis  Recently treated and seen in the office for an upper respiratory tract infection, sinusitis.  Was placed on antibiotic treatment and states that she is feeling better.  On evaluation today patient does have some very slight erythema to nasal mucosa with clear nasal discharge, very slight erythema to posterior airway  with a clear postnasal drip.  Patient overall is improving and will continue antibiotics till finished.  Will call if not 100% of recurrence of symptoms              History of Present Illness     Patient is a 69-year-old female with a history of multiple medical problems previously who is here today for routine follow-up after 4-month period of time.  She did have labs performed prior to the visit today we did discuss the results.  Patient also was recently seen in the office for upper respiratory tract infection, sinusitis and was treated and states overall feeling better.  Review of Systems   Constitutional:  Positive for fatigue. Negative for activity change, appetite change, chills, diaphoresis, fever and unexpected weight change.        Slowly getting back to normal activity level.  Feels a little fatigued today after being extremely active yesterday   HENT:  Positive for congestion and postnasal drip. Negative for dental problem, drooling, ear discharge, ear pain, facial swelling, hearing loss, mouth sores, nosebleeds, rhinorrhea, sinus pressure, sinus pain, sneezing, sore throat, tinnitus, trouble swallowing and voice change.         Less congestion less postnasal drip.  Feeling better   Eyes: Negative.    Respiratory: Negative.     Cardiovascular: Negative.    Gastrointestinal: Negative.    Endocrine: Negative.    Genitourinary: Negative.    Musculoskeletal: Negative.    Allergic/Immunologic: Negative.    Neurological: Negative.    Hematological: Negative.    Psychiatric/Behavioral: Negative.     Past Medical History:   Diagnosis Date   • GERD (gastroesophageal reflux disease)    • Hyperlipemia    • Hypertension    • PONV (postoperative nausea and vomiting)      Past Surgical History:   Procedure Laterality Date   •  SECTION     • COLONOSCOPY  2020    Dr Petersen , 5 yr f/u    • COLPOPEXY SACRAL LAPAROSCOPIC N/A 3/1/2024    Procedure: ROBOTIC COLPOPEXY SACRAL;  Surgeon: Mac Argueta  MD Krish;  [facility];  Service: UroGynecology          • HYSTEROSCOPY W/ ENDOMETRIAL ABLATION     • FL CYSTOURETHROSCOPY N/A 3/1/2024    Procedure: CYSTO;  Surgeon: Mac Rutherford MD;  [facility];  Service: UroGynecology          • FL LAPS TOTAL HYSTERECT 250 GM/< W/RMVL TUBE/OVARY N/A 3/1/2024    Procedure: LTH; BSO; EUA EXCISION OF VULVAR CYST;  Surgeon: Mac Rutherford MD;  [facility];  Service: UroGynecology          • FL SLING OPERATION STRESS INCONTINENCE N/A 3/1/2024    Procedure: PV SLING;  Surgeon: Mac Rutherford MD;  [facility];  Service: UroGynecology            Family History   Problem Relation Age of Onset   • Ovarian cancer Paternal Aunt 60   • Pancreatic cancer Paternal Aunt 78   • Colon cancer Cousin 50        mets to lung   • Lung cancer Cousin 50        mets from colon cancer   • Diabetes Mother    • Heart disease Father    • Diabetes Father    • No Known Problems Son    • No Known Problems Son      Social History     Tobacco Use   • Smoking status: Never   • Smokeless tobacco: Never   Vaping Use   • Vaping status: Never Used   Substance and Sexual Activity   • Alcohol use: Yes     Comment: social   • Drug use: Never   • Sexual activity: Yes     Partners: Male     Birth control/protection: Post-menopausal     Current Outpatient Medications on File Prior to Visit   Medication Sig   • atorvastatin (LIPITOR) 10 mg tablet TAKE 1 TABLET BY MOUTH EVERY DAY   • Calcium 200 MG TABS Calcium   • Coenzyme Q10 (CoQ-10) 100 MG capsule 200 mg   • doxycycline hyclate (VIBRAMYCIN) 100 mg capsule Take 1 capsule (100 mg total) by mouth every 12 (twelve) hours for 10 days   • fluticasone (FLONASE) 50 mcg/act nasal spray 1 spray into each nostril if needed for rhinitis   • Multiple Vitamins-Minerals (Multivitamin & Mineral) LIQD Multivitamin   • valsartan (DIOVAN) 160 mg tablet TAKE 1 TABLET BY MOUTH EVERY DAY   • [DISCONTINUED]  "Cholecalciferol (Vitamin D3) 50 MCG (2000 UT) capsule Vitamin D3     Allergies   Allergen Reactions   • Grass Extracts [Gramineae Pollens]    • Mold Extract [Trichophyton]    • Other      Trees  [place] trees     Immunization History   Administered Date(s) Administered   • COVID-19 PFIZER VACCINE 0.3 ML IM 03/20/2021, 04/10/2021, 11/19/2021   • INFLUENZA 10/27/2015, 10/19/2016, 10/24/2022, 10/11/2023   • Influenza Quadrivalent Preservative Free 3 years and older IM 11/05/2014   • Influenza Quadrivalent, 6-35 Months IM 11/07/2017   • Influenza, recombinant, quadrivalent,injectable, preservative free 09/12/2018, 10/21/2019, 09/29/2020, 09/24/2021   • Influenza, seasonal, injectable 10/23/2013   • Tdap 11/07/2017, 05/23/2024   • Zoster Vaccine Recombinant 10/29/2024   • influenza, trivalent, adjuvanted 10/11/2024     Objective   /80   Pulse 72   Temp 98.2 °F (36.8 °C)   Resp 16   Ht 5' 5\" (1.651 m)   Wt 68 kg (150 lb)   SpO2 97%   BMI 24.96 kg/m²     Physical Exam  Vitals and nursing note reviewed.   Constitutional:       General: She is not in acute distress.     Appearance: Normal appearance. She is normal weight. She is not ill-appearing, toxic-appearing or diaphoretic.      Comments: Pleasant, articulate, cheerful 69-year-old female who is awake alert in no acute distress oriented x 3   HENT:      Head: Normocephalic and atraumatic.      Right Ear: Tympanic membrane, ear canal and external ear normal. There is no impacted cerumen.      Left Ear: Tympanic membrane, ear canal and external ear normal. There is no impacted cerumen.      Nose: Nose normal. No congestion or rhinorrhea.      Comments: Very slight diffuse erythema clear nasal discharge.  Improved     Mouth/Throat:      Mouth: Mucous membranes are moist.      Pharynx: Oropharynx is clear. Posterior oropharyngeal erythema present. No oropharyngeal exudate.      Comments: Very slight erythema to posterior airway with a clear nasal discharge  Eyes:    "   General: No scleral icterus.        Right eye: No discharge.         Left eye: No discharge.      Extraocular Movements: Extraocular movements intact.      Conjunctiva/sclera: Conjunctivae normal.      Pupils: Pupils are equal, round, and reactive to light.   Neck:      Vascular: No carotid bruit.   Cardiovascular:      Rate and Rhythm: Normal rate and regular rhythm.      Pulses: Normal pulses.      Heart sounds: Normal heart sounds. No murmur heard.     No friction rub. No gallop.   Pulmonary:      Effort: Pulmonary effort is normal. No respiratory distress.      Breath sounds: Normal breath sounds. No stridor. No wheezing, rhonchi or rales.   Chest:      Chest wall: No tenderness.   Abdominal:      General: Abdomen is flat. Bowel sounds are normal. There is no distension.      Palpations: Abdomen is soft. There is no mass.      Tenderness: There is no abdominal tenderness. There is no right CVA tenderness, left CVA tenderness, guarding or rebound.      Hernia: No hernia is present.   Musculoskeletal:         General: No swelling, tenderness, deformity or signs of injury. Normal range of motion.      Cervical back: Normal range of motion and neck supple. No rigidity or tenderness.      Right lower leg: No edema.      Left lower leg: No edema.   Lymphadenopathy:      Cervical: No cervical adenopathy.   Skin:     General: Skin is warm and dry.      Capillary Refill: Capillary refill takes less than 2 seconds.      Coloration: Skin is not jaundiced or pale.      Findings: No bruising, erythema, lesion or rash.   Neurological:      General: No focal deficit present.      Mental Status: She is alert and oriented to person, place, and time. Mental status is at baseline.      Cranial Nerves: No cranial nerve deficit.      Sensory: No sensory deficit.      Motor: No weakness.      Coordination: Coordination normal.      Gait: Gait normal.      Deep Tendon Reflexes: Reflexes normal.   Psychiatric:         Mood and  Affect: Mood normal.         Behavior: Behavior normal.         Thought Content: Thought content normal.         Judgment: Judgment normal.

## 2025-03-12 NOTE — ASSESSMENT & PLAN NOTE
Patient is complaining of some discomfort at the base of her left hand just above her wrist.  No numbness or tingling with palpation, negative Tinel's sign and no weakness.  Patient will keep us informed of any changes.

## 2025-04-06 PROBLEM — J01.01 ACUTE RECURRENT MAXILLARY SINUSITIS: Status: RESOLVED | Noted: 2025-03-07 | Resolved: 2025-04-06

## 2025-04-10 DIAGNOSIS — I10 ESSENTIAL HYPERTENSION: ICD-10-CM

## 2025-04-10 RX ORDER — VALSARTAN 160 MG/1
160 TABLET ORAL DAILY
Qty: 90 TABLET | Refills: 1 | Status: SHIPPED | OUTPATIENT
Start: 2025-04-10

## 2025-06-12 DIAGNOSIS — E78.01 FAMILIAL HYPERCHOLESTEROLEMIA: ICD-10-CM

## 2025-06-12 RX ORDER — ATORVASTATIN CALCIUM 10 MG/1
10 TABLET, FILM COATED ORAL DAILY
Qty: 90 TABLET | Refills: 1 | Status: SHIPPED | OUTPATIENT
Start: 2025-06-12

## 2025-06-30 ENCOUNTER — RA CDI HCC (OUTPATIENT)
Dept: OTHER | Facility: HOSPITAL | Age: 70
End: 2025-06-30

## 2025-06-30 ENCOUNTER — APPOINTMENT (OUTPATIENT)
Dept: LAB | Facility: CLINIC | Age: 70
End: 2025-06-30
Attending: INTERNAL MEDICINE
Payer: COMMERCIAL

## 2025-06-30 DIAGNOSIS — E78.01 FAMILIAL HYPERCHOLESTEROLEMIA: ICD-10-CM

## 2025-06-30 DIAGNOSIS — R73.03 PREDIABETES: ICD-10-CM

## 2025-06-30 DIAGNOSIS — I10 PRIMARY HYPERTENSION: ICD-10-CM

## 2025-06-30 LAB
ANION GAP SERPL CALCULATED.3IONS-SCNC: 4 MMOL/L (ref 4–13)
BUN SERPL-MCNC: 19 MG/DL (ref 5–25)
CALCIUM SERPL-MCNC: 9.2 MG/DL (ref 8.4–10.2)
CHLORIDE SERPL-SCNC: 107 MMOL/L (ref 96–108)
CHOLEST SERPL-MCNC: 169 MG/DL (ref ?–200)
CO2 SERPL-SCNC: 29 MMOL/L (ref 21–32)
CREAT SERPL-MCNC: 0.86 MG/DL (ref 0.6–1.3)
EST. AVERAGE GLUCOSE BLD GHB EST-MCNC: 131 MG/DL
GFR SERPL CREATININE-BSD FRML MDRD: 70 ML/MIN/1.73SQ M
GLUCOSE P FAST SERPL-MCNC: 109 MG/DL (ref 65–99)
HBA1C MFR BLD: 6.2 %
HDLC SERPL-MCNC: 45 MG/DL
LDLC SERPL CALC-MCNC: 84 MG/DL (ref 0–100)
NONHDLC SERPL-MCNC: 124 MG/DL
POTASSIUM SERPL-SCNC: 4.2 MMOL/L (ref 3.5–5.3)
SODIUM SERPL-SCNC: 140 MMOL/L (ref 135–147)
TRIGL SERPL-MCNC: 202 MG/DL (ref ?–150)

## 2025-06-30 PROCEDURE — 80048 BASIC METABOLIC PNL TOTAL CA: CPT

## 2025-06-30 PROCEDURE — 36415 COLL VENOUS BLD VENIPUNCTURE: CPT

## 2025-06-30 PROCEDURE — 83036 HEMOGLOBIN GLYCOSYLATED A1C: CPT

## 2025-06-30 PROCEDURE — 80061 LIPID PANEL: CPT

## 2025-07-10 ENCOUNTER — OFFICE VISIT (OUTPATIENT)
Age: 70
End: 2025-07-10
Payer: COMMERCIAL

## 2025-07-10 VITALS
DIASTOLIC BLOOD PRESSURE: 80 MMHG | HEIGHT: 65 IN | TEMPERATURE: 98.2 F | HEART RATE: 79 BPM | SYSTOLIC BLOOD PRESSURE: 128 MMHG | WEIGHT: 149 LBS | RESPIRATION RATE: 16 BRPM | OXYGEN SATURATION: 97 % | BODY MASS INDEX: 24.83 KG/M2

## 2025-07-10 DIAGNOSIS — I10 PRIMARY HYPERTENSION: ICD-10-CM

## 2025-07-10 DIAGNOSIS — Z12.11 COLON CANCER SCREENING: ICD-10-CM

## 2025-07-10 DIAGNOSIS — E78.01 FAMILIAL HYPERCHOLESTEROLEMIA: ICD-10-CM

## 2025-07-10 DIAGNOSIS — R73.03 PREDIABETES: ICD-10-CM

## 2025-07-10 DIAGNOSIS — Z00.00 HEALTHCARE MAINTENANCE: Primary | ICD-10-CM

## 2025-07-10 PROCEDURE — G2211 COMPLEX E/M VISIT ADD ON: HCPCS | Performed by: INTERNAL MEDICINE

## 2025-07-10 PROCEDURE — 99214 OFFICE O/P EST MOD 30 MIN: CPT | Performed by: INTERNAL MEDICINE

## 2025-07-10 RX ORDER — METFORMIN HYDROCHLORIDE 500 MG/1
500 TABLET, EXTENDED RELEASE ORAL
Qty: 30 TABLET | Refills: 5 | Status: SHIPPED | OUTPATIENT
Start: 2025-07-10

## 2025-07-10 NOTE — ASSESSMENT & PLAN NOTE
History of hypertension.  Patient remains on medication specifically Diovan 180 mg daily and her blood pressure has been well-controlled.  She will continue present medication and surveillance and to check on her renal function with her next visit.  Continue to make adjustments of medication if needed in the future.

## 2025-07-10 NOTE — ASSESSMENT & PLAN NOTE
Patient will be due for repeat Medicare wellness visit when she returns to the office in 4 months.  Patient was given a l slip for complete labs to be performed prior to that visit.  Encouraged to call in between if any new problems or concerns.    Orders:    Comprehensive metabolic panel; Future    CBC and differential; Future    Lipid panel; Future    Hemoglobin A1C; Future    Ambulatory Referral to Colorectal Surgery; Future    metFORMIN (GLUCOPHAGE-XR) 500 mg 24 hr tablet; Take 1 tablet (500 mg total) by mouth daily with breakfast

## 2025-07-10 NOTE — PROGRESS NOTES
Name: Sagrario Potts      : 1955      MRN: [id number]  Encounter Provider: Diego Eastman DO  Encounter Date: 7/10/2025   Encounter department: [facility] INTERNAL MEDICINE    Assessment & Plan  Prediabetes  Patient's fasting blood sugar is elevated and her hemoglobin A1c has risen slightly.  Patient does have a strong family history of diabetes and despite the patient being active as far as diet and exercise is concerned we note that her sugar will slowly increase.  With this thought we feel it is appropriate for the patient to be placed on medication such as metformin that will hopefully prevent progression of her disease.  We did discuss the effects of this medication and the side effects and she is agreeable to try this.  Prescription for metformin  mg to take daily with food and check a fasting blood sugar hemoglobin A1c with the next visit    Orders:  •  Hemoglobin A1C; Future  •  metFORMIN (GLUCOPHAGE-XR) 500 mg 24 hr tablet; Take 1 tablet (500 mg total) by mouth daily with breakfast    Familial hypercholesterolemia  History of hyperlipidemia.  Patient remains on atorvastatin 10 mg daily and her cholesterols been controlled.  Patient has been compliant with her diet but admits he is not always perfect.  Continue to monitor her cholesterol and make changes with medication if needed in the future         Healthcare maintenance  Patient will be due for repeat Medicare wellness visit when she returns to the office in 4 months.  Patient was given a l slip for complete labs to be performed prior to that visit.  Encouraged to call in between if any new problems or concerns.    Orders:  •  Comprehensive metabolic panel; Future  •  CBC and differential; Future  •  Lipid panel; Future  •  Hemoglobin A1C; Future  •  Ambulatory Referral to Colorectal Surgery; Future  •  metFORMIN (GLUCOPHAGE-XR) 500 mg 24 hr tablet; Take 1 tablet (500 mg total) by mouth daily with breakfast    Colon cancer  screening    Orders:  •  Ambulatory Referral to Colorectal Surgery; Future    Primary hypertension  History of hypertension.  Patient remains on medication specifically Diovan 180 mg daily and her blood pressure has been well-controlled.  She will continue present medication and surveillance and to check on her renal function with her next visit.  Continue to make adjustments of medication if needed in the future.              History of Present Illness     Patient is a 69-year-old female history of multiple medical problems outlined previously who is here today for routine follow-up.  Patient did have labs performed prior to the visit today we did discuss the results that she is already reviewed online.  Patient states she is doing well and she is active babysitting her grandchildren her part of the summer.  No new medical complaints  Review of Systems   Constitutional: Negative.    HENT: Negative.     Eyes: Negative.    Respiratory: Negative.     Cardiovascular: Negative.    Gastrointestinal: Negative.    Endocrine: Negative.    Genitourinary: Negative.    Musculoskeletal: Negative.    Skin: Negative.    Allergic/Immunologic: Negative.    Neurological: Negative.    Hematological: Negative.    Psychiatric/Behavioral: Negative.     Past Medical History[1]  Past Surgical History[2]  Family History[3]  Social History[4]  Medications[5]  Allergies   Allergen Reactions   • Grass Extracts [Gramineae Pollens]    • Mold Extract [Trichophyton]    • Other      Trees  [place] trees     Immunization History   Administered Date(s) Administered   • COVID-19 PFIZER VACCINE 0.3 ML IM 03/20/2021, 04/10/2021, 11/19/2021   • INFLUENZA 10/27/2015, 10/19/2016, 10/24/2022, 10/11/2023   • Influenza Quadrivalent Preservative Free 3 years and older IM 11/05/2014   • Influenza Quadrivalent, 6-35 Months IM 11/07/2017   • Influenza, recombinant, quadrivalent,injectable, preservative free 09/12/2018, 10/21/2019, 09/29/2020, 09/24/2021   •  "Influenza, seasonal, injectable 10/23/2013   • Tdap 11/07/2017, 05/23/2024   • Zoster Vaccine Recombinant 10/29/2024   • influenza, trivalent, adjuvanted 10/11/2024     Objective   /80   Pulse 79   Temp 98.2 °F (36.8 °C)   Resp 16   Ht 5' 5\" (1.651 m)   Wt 67.6 kg (149 lb)   SpO2 97%   BMI 24.79 kg/m²     Physical Exam  Vitals and nursing note reviewed.   Constitutional:       General: She is not in acute distress.     Appearance: Normal appearance. She is normal weight. She is not ill-appearing, toxic-appearing or diaphoretic.      Comments: Pleasant, articulate 69-year-old female who is awake alert in no acute distress oriented x 3   HENT:      Head: Normocephalic and atraumatic.      Right Ear: Tympanic membrane, ear canal and external ear normal. There is no impacted cerumen.      Left Ear: Tympanic membrane, ear canal and external ear normal. There is no impacted cerumen.      Nose: Nose normal. No congestion or rhinorrhea.      Comments: Very slight diffuse erythema clear nasal discharge.  Improved     Mouth/Throat:      Mouth: Mucous membranes are moist.      Pharynx: Oropharynx is clear. No oropharyngeal exudate or posterior oropharyngeal erythema.     Eyes:      General: No scleral icterus.        Right eye: No discharge.         Left eye: No discharge.      Extraocular Movements: Extraocular movements intact.      Conjunctiva/sclera: Conjunctivae normal.      Pupils: Pupils are equal, round, and reactive to light.     Neck:      Vascular: No carotid bruit.     Cardiovascular:      Rate and Rhythm: Normal rate and regular rhythm.      Pulses: Normal pulses.      Heart sounds: Normal heart sounds. No murmur heard.     No friction rub. No gallop.   Pulmonary:      Effort: Pulmonary effort is normal. No respiratory distress.      Breath sounds: Normal breath sounds. No stridor. No wheezing, rhonchi or rales.   Chest:      Chest wall: No tenderness.   Abdominal:      General: Abdomen is flat. Bowel " sounds are normal. There is no distension.      Palpations: Abdomen is soft. There is no mass.      Tenderness: There is no abdominal tenderness. There is no right CVA tenderness, left CVA tenderness, guarding or rebound.      Hernia: No hernia is present.     Musculoskeletal:         General: No swelling, tenderness, deformity or signs of injury. Normal range of motion.      Cervical back: Normal range of motion and neck supple. No rigidity or tenderness.      Right lower leg: No edema.      Left lower leg: No edema.   Lymphadenopathy:      Cervical: No cervical adenopathy.     Skin:     General: Skin is warm and dry.      Capillary Refill: Capillary refill takes less than 2 seconds.      Coloration: Skin is not jaundiced or pale.      Findings: No bruising, erythema, lesion or rash.     Neurological:      General: No focal deficit present.      Mental Status: She is alert and oriented to person, place, and time. Mental status is at baseline.      Cranial Nerves: No cranial nerve deficit.      Sensory: No sensory deficit.      Motor: No weakness.      Coordination: Coordination normal.      Gait: Gait normal.      Deep Tendon Reflexes: Reflexes normal.     Psychiatric:         Mood and Affect: Mood normal.         Behavior: Behavior normal.         Thought Content: Thought content normal.         Judgment: Judgment normal.            [1]  Past Medical History:  Diagnosis Date   • GERD (gastroesophageal reflux disease)    • Hyperlipemia    • Hypertension    • PONV (postoperative nausea and vomiting)    [2]  Past Surgical History:  Procedure Laterality Date   •  SECTION     • COLONOSCOPY  2020    Dr Petersen , 5 yr f/u    • COLPOPEXY SACRAL LAPAROSCOPIC N/A 3/1/2024    Procedure: ROBOTIC COLPOPEXY SACRAL;  Surgeon: Mac Rutherford MD;  Location: [facility] OR;  Service: UroGynecology          • HYSTEROSCOPY W/ ENDOMETRIAL ABLATION     • SD CYSTOURETHROSCOPY N/A 3/1/2024    Procedure: CYSTO;   Surgeon: Mac Rutherford MD;  Location: [facility];  Service: UroGynecology          • TN LAPS TOTAL HYSTERECT 250 GM/< W/RMVL TUBE/OVARY N/A 3/1/2024    Procedure: LTH; BSO; EUA EXCISION OF VULVAR CYST;  Surgeon: Mac Rutherford MD;  Location: [facility];  Service: UroGynecology          • TN SLING OPERATION STRESS INCONTINENCE N/A 3/1/2024    Procedure: PV SLING;  Surgeon: Mac Rutherford MD;  Location: [facility];  Service: UroGynecology          [3]  Family History  Problem Relation Name Age of Onset   • Ovarian cancer Paternal Aunt dalia 60   • Pancreatic cancer Paternal Aunt ai 78   • Colon cancer Cousin maternal 50        mets to lung   • Lung cancer Cousin maternal 50        mets from colon cancer   • Diabetes Mother     • Heart disease Father     • Diabetes Father     • No Known Problems Son     • No Known Problems Son     [4]  Social History  Tobacco Use   • Smoking status: Never   • Smokeless tobacco: Never   Vaping Use   • Vaping status: Never Used   Substance and Sexual Activity   • Alcohol use: Yes     Comment: social   • Drug use: Never   • Sexual activity: Yes     Partners: Male     Birth control/protection: Post-menopausal   [5]  Current Outpatient Medications on File Prior to Visit   Medication Sig   • atorvastatin (LIPITOR) 10 mg tablet TAKE 1 TABLET BY MOUTH EVERY DAY   • Calcium 200 MG TABS    • Coenzyme Q10 (CoQ-10) 100 MG capsule 200 mg   • fluticasone (FLONASE) 50 mcg/act nasal spray 1 spray into each nostril if needed for rhinitis   • Multiple Vitamins-Minerals (Multivitamin & Mineral) LIQD    • valsartan (DIOVAN) 160 mg tablet TAKE 1 TABLET BY MOUTH EVERY DAY

## 2025-07-10 NOTE — ASSESSMENT & PLAN NOTE
History of hyperlipidemia.  Patient remains on atorvastatin 10 mg daily and her cholesterols been controlled.  Patient has been compliant with her diet but admits he is not always perfect.  Continue to monitor her cholesterol and make changes with medication if needed in the future

## 2025-07-10 NOTE — ASSESSMENT & PLAN NOTE
Patient's fasting blood sugar is elevated and her hemoglobin A1c has risen slightly.  Patient does have a strong family history of diabetes and despite the patient being active as far as diet and exercise is concerned we note that her sugar will slowly increase.  With this thought we feel it is appropriate for the patient to be placed on medication such as metformin that will hopefully prevent progression of her disease.  We did discuss the effects of this medication and the side effects and she is agreeable to try this.  Prescription for metformin  mg to take daily with food and check a fasting blood sugar hemoglobin A1c with the next visit    Orders:    Hemoglobin A1C; Future    metFORMIN (GLUCOPHAGE-XR) 500 mg 24 hr tablet; Take 1 tablet (500 mg total) by mouth daily with breakfast

## 2025-07-21 ENCOUNTER — HOSPITAL ENCOUNTER (OUTPATIENT)
Dept: RADIOLOGY | Age: 70
Discharge: HOME | End: 2025-07-21
Payer: COMMERCIAL

## 2025-07-21 VITALS — WEIGHT: 148 LBS | HEIGHT: 65 IN | BODY MASS INDEX: 24.66 KG/M2

## 2025-07-21 DIAGNOSIS — Z12.31 ENCOUNTER FOR SCREENING MAMMOGRAM FOR MALIGNANT NEOPLASM OF BREAST: ICD-10-CM

## 2025-07-21 PROCEDURE — 77067 SCR MAMMO BI INCL CAD: CPT

## 2025-07-21 PROCEDURE — 77063 BREAST TOMOSYNTHESIS BI: CPT

## (undated) DEVICE — PROGRASP FORCEPS: Brand: ENDOWRIST

## (undated) DEVICE — SUT MONOCRYL 4-0 PS-2 27 IN Y426H

## (undated) DEVICE — ENDOPATH PNEUMONEEDLE INSUFFLATION NEEDLES WITH LUER LOCK CONNECTORS 120MM: Brand: ENDOPATH

## (undated) DEVICE — TROCARS: Brand: KII® OPTICAL ACCESS SYSTEM

## (undated) DEVICE — ASTOUND STANDARD SURGICAL GOWN, XL: Brand: CONVERTORS

## (undated) DEVICE — ELECTRO LUBE IS A SINGLE PATIENT USE DEVICE THAT IS INTENDED TO BE USED ON ELECTROSURGICAL ELECTRODES TO REDUCE STICKING.: Brand: KEY SURGICAL ELECTRO LUBE

## (undated) DEVICE — UNDER BUTTOCKS DRAPE W/FLUID CONTROL POUCH: Brand: CONVERTORS

## (undated) DEVICE — DRAPE TOWEL: Brand: CONVERTORS

## (undated) DEVICE — IRRIG ENDO FLO TUBING

## (undated) DEVICE — BLADELESS OBTURATOR: Brand: WECK VISTA

## (undated) DEVICE — 5 MM CURVED DISSECTORS WITH MONOPOLAR CAUTERY: Brand: ENDOPATH

## (undated) DEVICE — TRAY FOLEY 16FR URIMETER SURESTEP

## (undated) DEVICE — CATH FOLEY 18FR 5ML 2 WAY UNCOATED SILICONE

## (undated) DEVICE — KIT, BETHLEHEM ROBOTIC PROST: Brand: CARDINAL HEALTH

## (undated) DEVICE — TROCAR: Brand: KII® SLEEVE

## (undated) DEVICE — DRAPE EQUIPMENT RF WAND

## (undated) DEVICE — CANNULA SEAL

## (undated) DEVICE — SEAL

## (undated) DEVICE — TIP COVER ACCESSORY

## (undated) DEVICE — SYRINGE 20ML LL

## (undated) DEVICE — MARYLAND BIPOLAR FORCEPS: Brand: ENDOWRIST

## (undated) DEVICE — REDUCER: Brand: ENDOWRIST

## (undated) DEVICE — CHLORAPREP HI-LITE 26ML ORANGE

## (undated) DEVICE — TUBING SMOKE EVAC W/FILTRATION DEVICE PLUMEPORT ACTIV

## (undated) DEVICE — TRAY FOLEY 16FR URIMETER SILICONE SURESTEP

## (undated) DEVICE — 40595 XL TRENDELENBURG POSITIONING KIT: Brand: 40595 XL TRENDELENBURG POSITIONING KIT

## (undated) DEVICE — BETHLEHEM UNIVERSAL GYN LAP PK: Brand: CARDINAL HEALTH

## (undated) DEVICE — INTENDED FOR TISSUE SEPARATION, AND OTHER PROCEDURES THAT REQUIRE A SHARP SURGICAL BLADE TO PUNCTURE OR CUT.: Brand: BARD-PARKER SAFETY BLADES SIZE 11, STERILE

## (undated) DEVICE — VISUALIZATION SYSTEM: Brand: CLEARIFY

## (undated) DEVICE — CYSTO TUBING SINGLE IRRIGATION

## (undated) DEVICE — EXIDINE 4 PCT

## (undated) DEVICE — BASIC SINGLE BASIN 2-LF: Brand: MEDLINE INDUSTRIES, INC.

## (undated) DEVICE — SCD SEQUENTIAL COMPRESSION COMFORT SLEEVE MEDIUM KNEE LENGTH: Brand: KENDALL SCD

## (undated) DEVICE — MONOPOLAR CURVED SCISSORS: Brand: ENDOWRIST

## (undated) DEVICE — [HIGH FLOW INSUFFLATOR,  DO NOT USE IF PACKAGE IS DAMAGED,  KEEP DRY,  KEEP AWAY FROM SUNLIGHT,  PROTECT FROM HEAT AND RADIOACTIVE SOURCES.]: Brand: PNEUMOSURE

## (undated) DEVICE — ADHESIVE SKIN HIGH VISCOSITY EXOFIN 1ML

## (undated) DEVICE — GAMMEX® NON-LATEX SENSITIVE SIZE 7.5, STERILE NEOPRENE POWDER-FREE SURGICAL GLOVE: Brand: GAMMEX

## (undated) DEVICE — PAD GROUNDING DUAL ADULT

## (undated) DEVICE — PENCIL ELECTROSURG E-Z CLEAN -0035H

## (undated) DEVICE — NEEDLE 25G X 1 1/2

## (undated) DEVICE — TROCAR: Brand: KII FIOS FIRST ENTRY

## (undated) DEVICE — TIP-UP FENESTRATED GRASPER: Brand: ENDOWRIST

## (undated) DEVICE — ALLENTOWN DR  LUCENTE S LAP PK: Brand: CARDINAL HEALTH

## (undated) DEVICE — GLOVE PI ULTRA TOUCH SZ.7.0

## (undated) DEVICE — METZENBAUM ADTEC SINGLE USE DISSECTING SCISSORS, SHAFT ONLY, MONOPOLAR, CURVED TO LEFT, WORKING LENGTH: 12 1/4", (310 MM), DIAM. 5 MM, INSULATED, DOUBLE ACTION, STERILE, DISPOSABLE, PACKAGE OF 10 PIECES: Brand: AESCULAP

## (undated) DEVICE — COLUMN DRAPE

## (undated) DEVICE — IV EXTENSION TUBING 33 IN

## (undated) DEVICE — SUT VICRYL 2-0 SH 27 IN UNDYED J417H

## (undated) DEVICE — IV FLUSH NSS 10ML POSIFLUSH

## (undated) DEVICE — LAPAROSCOPIC SMOKE EVAC TUBING

## (undated) DEVICE — STITCHKIT EPTFE SUTURE DELIVERY CANISTER: Brand: STITCHKIT

## (undated) DEVICE — SPECIMEN CONTAINER STERILE PEEL PACK

## (undated) DEVICE — M-CLOSE KIT

## (undated) DEVICE — BLUE HEAT SCOPE WARMER

## (undated) DEVICE — PREMIUM DRY TRAY LF: Brand: MEDLINE INDUSTRIES, INC.

## (undated) DEVICE — SUT PDS II 0 CT-2 27 IN Z334H

## (undated) DEVICE — DRAPE LAPAROTOMY W/POUCHES

## (undated) DEVICE — NEEDLE HYPO 22G X 1-1/2 IN

## (undated) DEVICE — ARM DRAPE

## (undated) DEVICE — 3000CC GUARDIAN II: Brand: GUARDIAN

## (undated) DEVICE — Device: Brand: TISSUE RETRIEVAL SYSTEM

## (undated) DEVICE — NEEDLE COUNTER LG W/RULER

## (undated) DEVICE — TENACULUM FORCEPS: Brand: ENDOWRIST

## (undated) DEVICE — SUT VICRYL 0 UR-6 27 IN J603H

## (undated) DEVICE — AIRSEAL TUBE SMOKE EVAC LUMENX3 FILTERED